# Patient Record
Sex: FEMALE | Race: WHITE | NOT HISPANIC OR LATINO
[De-identification: names, ages, dates, MRNs, and addresses within clinical notes are randomized per-mention and may not be internally consistent; named-entity substitution may affect disease eponyms.]

---

## 2019-10-17 ENCOUNTER — APPOINTMENT (OUTPATIENT)
Dept: CARDIOLOGY | Facility: CLINIC | Age: 84
End: 2019-10-17
Payer: MEDICARE

## 2019-10-17 PROCEDURE — 93000 ELECTROCARDIOGRAM COMPLETE: CPT

## 2019-10-17 PROCEDURE — 99213 OFFICE O/P EST LOW 20 MIN: CPT

## 2020-07-16 ENCOUNTER — APPOINTMENT (OUTPATIENT)
Dept: CARDIOLOGY | Facility: CLINIC | Age: 85
End: 2020-07-16

## 2020-11-14 ENCOUNTER — INPATIENT (INPATIENT)
Facility: HOSPITAL | Age: 85
LOS: 2 days | Discharge: HOME HEALTH | End: 2020-11-17
Attending: INTERNAL MEDICINE | Admitting: INTERNAL MEDICINE
Payer: MEDICARE

## 2020-11-14 VITALS
SYSTOLIC BLOOD PRESSURE: 189 MMHG | WEIGHT: 110.01 LBS | RESPIRATION RATE: 18 BRPM | HEIGHT: 62 IN | DIASTOLIC BLOOD PRESSURE: 98 MMHG | OXYGEN SATURATION: 95 % | HEART RATE: 116 BPM

## 2020-11-14 DIAGNOSIS — J47.0 BRONCHIECTASIS WITH ACUTE LOWER RESPIRATORY INFECTION: ICD-10-CM

## 2020-11-14 DIAGNOSIS — J18.9 PNEUMONIA, UNSPECIFIED ORGANISM: ICD-10-CM

## 2020-11-14 DIAGNOSIS — E04.1 NONTOXIC SINGLE THYROID NODULE: ICD-10-CM

## 2020-11-14 DIAGNOSIS — J47.9 BRONCHIECTASIS, UNCOMPLICATED: ICD-10-CM

## 2020-11-14 DIAGNOSIS — R04.2 HEMOPTYSIS: ICD-10-CM

## 2020-11-14 DIAGNOSIS — I71.2 THORACIC AORTIC ANEURYSM, WITHOUT RUPTURE: ICD-10-CM

## 2020-11-14 DIAGNOSIS — Z88.0 ALLERGY STATUS TO PENICILLIN: ICD-10-CM

## 2020-11-14 DIAGNOSIS — Z88.2 ALLERGY STATUS TO SULFONAMIDES: ICD-10-CM

## 2020-11-14 LAB
ALBUMIN SERPL ELPH-MCNC: 4.3 G/DL — SIGNIFICANT CHANGE UP (ref 3.5–5.2)
ALP SERPL-CCNC: 82 U/L — SIGNIFICANT CHANGE UP (ref 30–115)
ALT FLD-CCNC: 15 U/L — SIGNIFICANT CHANGE UP (ref 0–41)
ANION GAP SERPL CALC-SCNC: 10 MMOL/L — SIGNIFICANT CHANGE UP (ref 7–14)
APTT BLD: 34.6 SEC — SIGNIFICANT CHANGE UP (ref 27–39.2)
AST SERPL-CCNC: 16 U/L — SIGNIFICANT CHANGE UP (ref 0–41)
BASOPHILS # BLD AUTO: 0.02 K/UL — SIGNIFICANT CHANGE UP (ref 0–0.2)
BASOPHILS NFR BLD AUTO: 0.2 % — SIGNIFICANT CHANGE UP (ref 0–1)
BILIRUB SERPL-MCNC: 0.3 MG/DL — SIGNIFICANT CHANGE UP (ref 0.2–1.2)
BLD GP AB SCN SERPL QL: SIGNIFICANT CHANGE UP
BUN SERPL-MCNC: 29 MG/DL — HIGH (ref 10–20)
CALCIUM SERPL-MCNC: 9.5 MG/DL — SIGNIFICANT CHANGE UP (ref 8.5–10.1)
CHLORIDE SERPL-SCNC: 100 MMOL/L — SIGNIFICANT CHANGE UP (ref 98–110)
CO2 SERPL-SCNC: 28 MMOL/L — SIGNIFICANT CHANGE UP (ref 17–32)
CREAT SERPL-MCNC: 0.7 MG/DL — SIGNIFICANT CHANGE UP (ref 0.7–1.5)
EOSINOPHIL # BLD AUTO: 0.04 K/UL — SIGNIFICANT CHANGE UP (ref 0–0.7)
EOSINOPHIL NFR BLD AUTO: 0.4 % — SIGNIFICANT CHANGE UP (ref 0–8)
GLUCOSE SERPL-MCNC: 168 MG/DL — HIGH (ref 70–99)
HCT VFR BLD CALC: 43.2 % — SIGNIFICANT CHANGE UP (ref 37–47)
HGB BLD-MCNC: 13.7 G/DL — SIGNIFICANT CHANGE UP (ref 12–16)
IMM GRANULOCYTES NFR BLD AUTO: 0.2 % — SIGNIFICANT CHANGE UP (ref 0.1–0.3)
INR BLD: 1.06 RATIO — SIGNIFICANT CHANGE UP (ref 0.65–1.3)
LACTATE SERPL-SCNC: 1.5 MMOL/L — SIGNIFICANT CHANGE UP (ref 0.7–2)
LYMPHOCYTES # BLD AUTO: 1.87 K/UL — SIGNIFICANT CHANGE UP (ref 1.2–3.4)
LYMPHOCYTES # BLD AUTO: 19.8 % — LOW (ref 20.5–51.1)
MAGNESIUM SERPL-MCNC: 2.1 MG/DL — SIGNIFICANT CHANGE UP (ref 1.8–2.4)
MCHC RBC-ENTMCNC: 29.6 PG — SIGNIFICANT CHANGE UP (ref 27–31)
MCHC RBC-ENTMCNC: 31.7 G/DL — LOW (ref 32–37)
MCV RBC AUTO: 93.3 FL — SIGNIFICANT CHANGE UP (ref 81–99)
MONOCYTES # BLD AUTO: 1.08 K/UL — HIGH (ref 0.1–0.6)
MONOCYTES NFR BLD AUTO: 11.4 % — HIGH (ref 1.7–9.3)
NEUTROPHILS # BLD AUTO: 6.43 K/UL — SIGNIFICANT CHANGE UP (ref 1.4–6.5)
NEUTROPHILS NFR BLD AUTO: 68 % — SIGNIFICANT CHANGE UP (ref 42.2–75.2)
NRBC # BLD: 0 /100 WBCS — SIGNIFICANT CHANGE UP (ref 0–0)
NT-PROBNP SERPL-SCNC: 289 PG/ML — SIGNIFICANT CHANGE UP (ref 0–300)
PLATELET # BLD AUTO: 299 K/UL — SIGNIFICANT CHANGE UP (ref 130–400)
POTASSIUM SERPL-MCNC: 4 MMOL/L — SIGNIFICANT CHANGE UP (ref 3.5–5)
POTASSIUM SERPL-SCNC: 4 MMOL/L — SIGNIFICANT CHANGE UP (ref 3.5–5)
PROT SERPL-MCNC: 6.6 G/DL — SIGNIFICANT CHANGE UP (ref 6–8)
PROTHROM AB SERPL-ACNC: 12.2 SEC — SIGNIFICANT CHANGE UP (ref 9.95–12.87)
RAPID RVP RESULT: SIGNIFICANT CHANGE UP
RBC # BLD: 4.63 M/UL — SIGNIFICANT CHANGE UP (ref 4.2–5.4)
RBC # FLD: 13.2 % — SIGNIFICANT CHANGE UP (ref 11.5–14.5)
SARS-COV-2 RNA SPEC QL NAA+PROBE: SIGNIFICANT CHANGE UP
SODIUM SERPL-SCNC: 138 MMOL/L — SIGNIFICANT CHANGE UP (ref 135–146)
TROPONIN T SERPL-MCNC: <0.01 NG/ML — SIGNIFICANT CHANGE UP
WBC # BLD: 9.46 K/UL — SIGNIFICANT CHANGE UP (ref 4.8–10.8)
WBC # FLD AUTO: 9.46 K/UL — SIGNIFICANT CHANGE UP (ref 4.8–10.8)

## 2020-11-14 PROCEDURE — 71275 CT ANGIOGRAPHY CHEST: CPT | Mod: 26

## 2020-11-14 PROCEDURE — 99223 1ST HOSP IP/OBS HIGH 75: CPT

## 2020-11-14 PROCEDURE — 99285 EMERGENCY DEPT VISIT HI MDM: CPT

## 2020-11-14 PROCEDURE — 71045 X-RAY EXAM CHEST 1 VIEW: CPT | Mod: 26

## 2020-11-14 RX ORDER — SODIUM CHLORIDE 9 MG/ML
1000 INJECTION INTRAMUSCULAR; INTRAVENOUS; SUBCUTANEOUS ONCE
Refills: 0 | Status: COMPLETED | OUTPATIENT
Start: 2020-11-14 | End: 2020-11-14

## 2020-11-14 RX ADMIN — SODIUM CHLORIDE 1000 MILLILITER(S): 9 INJECTION INTRAMUSCULAR; INTRAVENOUS; SUBCUTANEOUS at 21:30

## 2020-11-14 NOTE — ED PROVIDER NOTE - OBJECTIVE STATEMENT
89 year old female past medical history of thoracic anyursem and bronchiectasis comes to emergency room for coughing up blood. patient explains that last night she had 2 epsiodes of coughing up a small amount of blood and that tonight about 1 hour ago coughed up a larger amount ~tablespoon. patient is not on a blood thinning medication. no chest pain, no shortness of breath, no leg pain or swelling. no back pain.

## 2020-11-14 NOTE — ED PROVIDER NOTE - ATTENDING CONTRIBUTION TO CARE
I personally evaluated the patient. I reviewed the Resident’s or Physician Assistant’s note (as assigned above), and agree with the findings and plan except as documented in my note.  Chart reviewed. H/O bronchiectasis, thoracic aneurysm, states she was coughing up blood since last night. No fever, chest pain or SOB. Exam shows alert patient in no distress, HEENT NCAT, lungs clear, no retractions, tachycardic, abdomen soft NT +BS, no CCE, no Vince's.

## 2020-11-14 NOTE — H&P ADULT - NSHPLABSRESULTS_GEN_ALL_CORE
CXR per ER - LLL infiltrate CXR per ER - LLL infiltrate                          13.7   9.46  )-----------( 299      ( 14 Nov 2020 19:20 )             43.2     11-14    138  |  100  |  29<H>  ----------------------------<  168<H>  4.0   |  28  |  0.7    Ca    9.5      14 Nov 2020 19:20  Mg     2.1     11-14    TPro  6.6  /  Alb  4.3  /  TBili  0.3  /  DBili  x   /  AST  16  /  ALT  15  /  AlkPhos  82  11-14            PT/INR - ( 14 Nov 2020 19:20 )   PT: 12.20 sec;   INR: 1.06 ratio         PTT - ( 14 Nov 2020 19:20 )  PTT:34.6 sec  Lactate Trend  11-14 @ 19:59 Lactate:1.5     CARDIAC MARKERS ( 14 Nov 2020 19:20 )  x     / <0.01 ng/mL / x     / x     / x          CAPILLARY BLOOD GLUCOSE    < from: CT Angio Chest PE Protocol w/ IV Cont (11.14.20 @ 20:46) >    EXAM:  CT ANGIO CHEST PE PROTOCOL IC          PROCEDURE DATE:  11/14/2020      < from: CT Angio Chest PE Protocol w/ IV Cont (11.14.20 @ 20:46) >    IMPRESSION:    1. Lingula and left lower lobe basilar medial segment extensive bronchiectasis with superimposed consolidative and nodular airspace opacities. Additional nodular airspace opacities throughout all lung lobes. Findings may be infectious in etiology, however follow-up CT chest in 2-3 months following treatment recommended to exclude underlying neoplasm.    2. No evidence of acute pulmonary embolism.    3. Aneurysmal dilation of ascending thoracic aorta up to 4.4 cm.    4. Partially calcified node 2.5 cm right thyroid lobe nodule.    AMBIKA MONROY M.D., ATTENDING RADIOLOGIST  This document has been electronically signed. Nov 14 2020  9:00PM    < end of copied text >

## 2020-11-14 NOTE — H&P ADULT - NSHPREVIEWOFSYSTEMS_GEN_ALL_CORE
Hearing loss Hearing loss (scheduled for ENT appointment in 2 days), avoids milk but eats yogurt daily Hearing loss (scheduled for ENT appointment in 2 days), wears glasses, avoids milk but eats yogurt daily

## 2020-11-14 NOTE — H&P ADULT - HISTORY OF PRESENT ILLNESS
[age]yo female whose PMH includes thoracic aneurysm and bronchiectasis (was supposed to follow up with pulmonary at [facility] but lives too far away), presents to the ER due to episodes of coughing up blood (small amount twice yesterday but a larger amount (about teaspoon) today 1 hour prior to ER visit. Denies chest pain or shortness of breath 89yo female whose PMH includes thoracic aneurysm and bronchiectasis (was supposed to follow up with pulmonary at [facility] but lives too far away), presents to the ER due to episodes of coughing up blood (small amount twice yesterday but a larger amount (about teaspoon) today 1 hour prior to ER visit. Denies chest pain, fevers or shortness of breath none

## 2020-11-14 NOTE — ED ADULT NURSE NOTE - NSIMPLEMENTINTERV_GEN_ALL_ED
Implemented All Fall with Harm Risk Interventions:  [place] to call system. Call bell, personal items and telephone within reach. Instruct patient to call for assistance. Room bathroom lighting operational. Non-slip footwear when patient is off stretcher. Physically safe environment: no spills, clutter or unnecessary equipment. Stretcher in lowest position, wheels locked, appropriate side rails in place. Provide visual cue, wrist band, yellow gown, etc. Monitor gait and stability. Monitor for mental status changes and reorient to person, place, and time. Review medications for side effects contributing to fall risk. Reinforce activity limits and safety measures with patient and family. Provide visual clues: red socks.

## 2020-11-14 NOTE — ED PROVIDER NOTE - PHYSICAL EXAMINATION
Physical Exam    Vital Signs: I have reviewed the initial vital signs.  Constitutional: elderly and frail, no acute distress  Eyes: Conjunctiva pink, Sclera clear, PERRLA, EOMI.  Cardiovascular: S1 and S2, regular rate, regular rhythm, well-perfused extremities, radial pulses equal and 2+  Respiratory: unlabored respiratory effort, clear to auscultation bilaterally no wheezing, rales and rhonchi  Gastrointestinal: soft, non-tender abdomen, no pulsatile mass, normal bowl sounds  Musculoskeletal: supple neck, no lower extremity edema, no midline tenderness  Integumentary: warm, dry, no rash  Neurologic: awake, alert, cranial nerves II-XII grossly intact, extremities’ motor and sensory functions grossly intact  Psychiatric: appropriate mood, appropriate affect

## 2020-11-14 NOTE — PATIENT PROFILE ADULT - FALLEN IN THE PAST
1. Have you been to the ER, urgent care clinic since your last visit? Hospitalized since your last visit? yes, [facility]    2. Have you seen or consulted any other health care providers outside of the [address] [place] [address] since your last visit? Include any pap smears or colon screening.  No no

## 2020-11-14 NOTE — ED PROVIDER NOTE - CLINICAL SUMMARY MEDICAL DECISION MAKING FREE TEXT BOX
Labs unremarkable. Covid negative. EKG sinus tach no acute changes. CXR LLL infiltrate. CTA chest no PE, +bronchiectasis with opacities. Given IVF and Levaquin. Will admit.

## 2020-11-15 DIAGNOSIS — I71.9 AORTIC ANEURYSM OF UNSPECIFIED SITE, WITHOUT RUPTURE: ICD-10-CM

## 2020-11-15 DIAGNOSIS — I10 ESSENTIAL (PRIMARY) HYPERTENSION: ICD-10-CM

## 2020-11-15 DIAGNOSIS — I26.99 OTHER PULMONARY EMBOLISM WITHOUT ACUTE COR PULMONALE: ICD-10-CM

## 2020-11-15 DIAGNOSIS — J18.9 PNEUMONIA, UNSPECIFIED ORGANISM: ICD-10-CM

## 2020-11-15 DIAGNOSIS — J47.9 BRONCHIECTASIS, UNCOMPLICATED: ICD-10-CM

## 2020-11-15 DIAGNOSIS — R04.2 HEMOPTYSIS: ICD-10-CM

## 2020-11-15 LAB
ALBUMIN SERPL ELPH-MCNC: 4.1 G/DL — SIGNIFICANT CHANGE UP (ref 3.5–5.2)
ALP SERPL-CCNC: 73 U/L — SIGNIFICANT CHANGE UP (ref 30–115)
ALT FLD-CCNC: 12 U/L — SIGNIFICANT CHANGE UP (ref 0–41)
ANION GAP SERPL CALC-SCNC: 8 MMOL/L — SIGNIFICANT CHANGE UP (ref 7–14)
AST SERPL-CCNC: 15 U/L — SIGNIFICANT CHANGE UP (ref 0–41)
BILIRUB SERPL-MCNC: 0.4 MG/DL — SIGNIFICANT CHANGE UP (ref 0.2–1.2)
BUN SERPL-MCNC: 19 MG/DL — SIGNIFICANT CHANGE UP (ref 10–20)
CALCIUM SERPL-MCNC: 9.3 MG/DL — SIGNIFICANT CHANGE UP (ref 8.5–10.1)
CHLORIDE SERPL-SCNC: 101 MMOL/L — SIGNIFICANT CHANGE UP (ref 98–110)
CO2 SERPL-SCNC: 30 MMOL/L — SIGNIFICANT CHANGE UP (ref 17–32)
CREAT SERPL-MCNC: 0.6 MG/DL — LOW (ref 0.7–1.5)
GLUCOSE SERPL-MCNC: 91 MG/DL — SIGNIFICANT CHANGE UP (ref 70–99)
HCT VFR BLD CALC: 41.4 % — SIGNIFICANT CHANGE UP (ref 37–47)
HGB BLD-MCNC: 13.2 G/DL — SIGNIFICANT CHANGE UP (ref 12–16)
MCHC RBC-ENTMCNC: 29.5 PG — SIGNIFICANT CHANGE UP (ref 27–31)
MCHC RBC-ENTMCNC: 31.9 G/DL — LOW (ref 32–37)
MCV RBC AUTO: 92.4 FL — SIGNIFICANT CHANGE UP (ref 81–99)
NRBC # BLD: 0 /100 WBCS — SIGNIFICANT CHANGE UP (ref 0–0)
PLATELET # BLD AUTO: 282 K/UL — SIGNIFICANT CHANGE UP (ref 130–400)
POTASSIUM SERPL-MCNC: 4.4 MMOL/L — SIGNIFICANT CHANGE UP (ref 3.5–5)
POTASSIUM SERPL-SCNC: 4.4 MMOL/L — SIGNIFICANT CHANGE UP (ref 3.5–5)
PROT SERPL-MCNC: 5.9 G/DL — LOW (ref 6–8)
RBC # BLD: 4.48 M/UL — SIGNIFICANT CHANGE UP (ref 4.2–5.4)
RBC # FLD: 13 % — SIGNIFICANT CHANGE UP (ref 11.5–14.5)
SODIUM SERPL-SCNC: 139 MMOL/L — SIGNIFICANT CHANGE UP (ref 135–146)
WBC # BLD: 9.67 K/UL — SIGNIFICANT CHANGE UP (ref 4.8–10.8)
WBC # FLD AUTO: 9.67 K/UL — SIGNIFICANT CHANGE UP (ref 4.8–10.8)

## 2020-11-15 PROCEDURE — 99233 SBSQ HOSP IP/OBS HIGH 50: CPT

## 2020-11-15 PROCEDURE — 99497 ADVNCD CARE PLAN 30 MIN: CPT | Mod: 25

## 2020-11-15 RX ORDER — LATANOPROST 0.05 MG/ML
1 SOLUTION/ DROPS OPHTHALMIC; TOPICAL AT BEDTIME
Refills: 0 | Status: DISCONTINUED | OUTPATIENT
Start: 2020-11-15 | End: 2020-11-17

## 2020-11-15 RX ORDER — LOSARTAN POTASSIUM 100 MG/1
50 TABLET, FILM COATED ORAL DAILY
Refills: 0 | Status: DISCONTINUED | OUTPATIENT
Start: 2020-11-15 | End: 2020-11-17

## 2020-11-15 RX ORDER — HEPARIN SODIUM 5000 [USP'U]/ML
5000 INJECTION INTRAVENOUS; SUBCUTANEOUS EVERY 12 HOURS
Refills: 0 | Status: DISCONTINUED | OUTPATIENT
Start: 2020-11-15 | End: 2020-11-17

## 2020-11-15 RX ORDER — METOPROLOL TARTRATE 50 MG
25 TABLET ORAL DAILY
Refills: 0 | Status: DISCONTINUED | OUTPATIENT
Start: 2020-11-15 | End: 2020-11-17

## 2020-11-15 RX ADMIN — LOSARTAN POTASSIUM 50 MILLIGRAM(S): 100 TABLET, FILM COATED ORAL at 05:16

## 2020-11-15 RX ADMIN — HEPARIN SODIUM 5000 UNIT(S): 5000 INJECTION INTRAVENOUS; SUBCUTANEOUS at 17:06

## 2020-11-15 RX ADMIN — LATANOPROST 1 DROP(S): 0.05 SOLUTION/ DROPS OPHTHALMIC; TOPICAL at 21:58

## 2020-11-15 NOTE — CONSULT NOTE ADULT - ASSESSMENT
IMPRESSION:        PLAN:     IMPRESSIOn   Pneumonia   acute on chronic bronchiectasis       PLAN:  Iv levaquine for now   follow bld cx   sputum cx   start prednisone 20 mg for 4 days   nebulizer Q 4 hrs prn   need follow up in 6 weeks to repeat ct scan

## 2020-11-15 NOTE — PROGRESS NOTE ADULT - SUBJECTIVE AND OBJECTIVE BOX
INTERVAL HPI/OVERNIGHT EVENTS:    SUBJECTIVE: Patient seen and examined at bedside.     no cp, sob, abd pain, fever  no further episode hemotypsis, no sob, cough, orthopnea    OBJECTIVE:    VITAL SIGNS:  Vital Signs Last 24 Hrs  T(C): 36.3 (15 Nov 2020 05:35), Max: 37.2 (14 Nov 2020 21:30)  T(F): 97.4 (15 Nov 2020 05:35), Max: 98.9 (14 Nov 2020 21:30)  HR: 83 (15 Nov 2020 05:35) (83 - 116)  BP: 146/72 (15 Nov 2020 05:35) (145/92 - 189/98)  BP(mean): --  RR: 16 (15 Nov 2020 05:35) (16 - 18)  SpO2: 96% (15 Nov 2020 00:18) (95% - 96%)      PHYSICAL EXAM:    General: NAD  HEENT: NC/AT; PERRL, clear conjunctiva  Neck: supple  Respiratory: diffuse crackles  Cardiovascular: +S1/S2; RRR  Abdomen: soft, NT/ND; +BS x4  Extremities: WWP, 2+ peripheral pulses b/l; no LE edema  Skin: normal color and turgor; no rash  Neurological:    MEDICATIONS:  MEDICATIONS  (STANDING):  heparin   Injectable 5000 Unit(s) SubCutaneous every 12 hours  latanoprost 0.005% Ophthalmic Solution 1 Drop(s) Both EYES at bedtime  levoFLOXacin IVPB 750 milliGRAM(s) IV Intermittent every 24 hours  losartan 50 milliGRAM(s) Oral daily  metoprolol succinate ER 25 milliGRAM(s) Oral daily    MEDICATIONS  (PRN):      ALLERGIES:  Allergies    penicillin (Eye Irritation)  sulfa drugs (Eye Irritation)    Intolerances        LABS:                        13.2   9.67  )-----------( 282      ( 15 Nov 2020 06:20 )             41.4     Hemoglobin: 13.2 g/dL (11-15 @ 06:20)  Hemoglobin: 13.7 g/dL (11-14 @ 19:20)    CBC Full  -  ( 15 Nov 2020 06:20 )  WBC Count : 9.67 K/uL  RBC Count : 4.48 M/uL  Hemoglobin : 13.2 g/dL  Hematocrit : 41.4 %  Platelet Count - Automated : 282 K/uL  Mean Cell Volume : 92.4 fL  Mean Cell Hemoglobin : 29.5 pg  Mean Cell Hemoglobin Concentration : 31.9 g/dL  Auto Neutrophil # : x  Auto Lymphocyte # : x  Auto Monocyte # : x  Auto Eosinophil # : x  Auto Basophil # : x  Auto Neutrophil % : x  Auto Lymphocyte % : x  Auto Monocyte % : x  Auto Eosinophil % : x  Auto Basophil % : x    11-15    139  |  101  |  19  ----------------------------<  91  4.4   |  30  |  0.6<L>    Ca    9.3      15 Nov 2020 06:20  Mg     2.1     11-14    TPro  5.9<L>  /  Alb  4.1  /  TBili  0.4  /  DBili  x   /  AST  15  /  ALT  12  /  AlkPhos  73  11-15    Creatinine Trend: 0.6<--, 0.7<--  LIVER FUNCTIONS - ( 15 Nov 2020 06:20 )  Alb: 4.1 g/dL / Pro: 5.9 g/dL / ALK PHOS: 73 U/L / ALT: 12 U/L / AST: 15 U/L / GGT: x           PT/INR - ( 14 Nov 2020 19:20 )   PT: 12.20 sec;   INR: 1.06 ratio         PTT - ( 14 Nov 2020 19:20 )  PTT:34.6 sec    hs Troponin:              CSF:                      EKG:   MICROBIOLOGY:    IMAGING:      Labs, imaging, EKG personally reviewed    RADIOLOGY & ADDITIONAL TESTS: Reviewed.

## 2020-11-15 NOTE — GOALS OF CARE CONVERSATION - ADVANCED CARE PLANNING - CONVERSATION DETAILS
D/w pt at bedside, she has bronchiectasis, admitted with hemotypsis. She has no living will, she is dnr/ dni. Molst filled, placed in chart.

## 2020-11-15 NOTE — CONSULT NOTE ADULT - SUBJECTIVE AND OBJECTIVE BOX
Patient is a 89y old  Female who presents with a chief complaint of pneumonia (14 Nov 2020 21:23)      HPI:  89yo female whose PMH includes thoracic aneurysm and bronchiectasis (was supposed to follow up with pulmonary at [facility] but lives too far away), presents to the ER due to episodes of coughing up blood (small amount twice yesterday but a larger amount (about teaspoon) today 1 hour prior to ER visit. Denies chest pain, fevers or shortness of breath (14 Nov 2020 21:23)      PAST MEDICAL & SURGICAL HISTORY:  HTN (hypertension)    Aneurysm of aorta  Throacic    Bronchiectasis    No significant past surgical history      Allergies    penicillin (Eye Irritation)  sulfa drugs (Eye Irritation)    Intolerances      Family history : no cardiovscular family history   Home Medications:  latanoprost 0.005% ophthalmic solution: 1 drop(s) to each affected eye once a day (in the evening) (14 Nov 2020 21:54)  losartan 50 mg oral tablet: 1 tab(s) orally once a day (14 Nov 2020 21:54)  metoprolol succinate 25 mg oral tablet, extended release: 1 tab(s) orally once a day (14 Nov 2020 21:54)    Occupation:  Alochol: Denied  Smoking: Denied  Drug Use: Denied  Marital Status:         ROS: as in HPI; All other systems reviewed are negative    ICU Vital Signs Last 24 Hrs  T(C): 36.3 (15 Nov 2020 05:35), Max: 37.2 (14 Nov 2020 21:30)  T(F): 97.4 (15 Nov 2020 05:35), Max: 98.9 (14 Nov 2020 21:30)  HR: 83 (15 Nov 2020 05:35) (83 - 116)  BP: 146/72 (15 Nov 2020 05:35) (145/92 - 189/98)  BP(mean): --  ABP: --  ABP(mean): --  RR: 16 (15 Nov 2020 05:35) (16 - 18)  SpO2: 96% (15 Nov 2020 00:18) (95% - 96%)        Physical Examination:    General: No acute distress.  Alert, oriented, interactive, nonfocal    HEENT: Pupils equal, reactive to light.  Symmetric.    PULM: b/l rhonchi     CVS: Regular rate and rhythm, no murmurs, rubs, or gallops    ABD: Soft, nondistended, nontender, normoactive bowel sounds, no masses    EXT: No edema, nontender, no clubbing     SKIN: Warm and well perfused, no rashes noted.    Neurology : no motor or sensory deficit     Musculoskeletal : move all extremity     Lymphatic system: no Palpable node               I&O's Detail        LABS:                        13.2   9.67  )-----------( 282      ( 15 Nov 2020 06:20 )             41.4     14 Nov 2020 19:20    138    |  100    |  29     ----------------------------<  168    4.0     |  28     |  0.7      Ca    9.5        14 Nov 2020 19:20  Mg     2.1       14 Nov 2020 19:20    TPro  6.6    /  Alb  4.3    /  TBili  0.3    /  DBili  x      /  AST  16     /  ALT  15     /  AlkPhos  82     14 Nov 2020 19:20  Amylase x     lipase x          CARDIAC MARKERS ( 14 Nov 2020 19:20 )  x     / <0.01 ng/mL / x     / x     / x          CAPILLARY BLOOD GLUCOSE        PT/INR - ( 14 Nov 2020 19:20 )   PT: 12.20 sec;   INR: 1.06 ratio         PTT - ( 14 Nov 2020 19:20 )  PTT:34.6 sec    Culture    Lactate, Blood: 1.5 mmol/L (11-14-20 @ 19:59)      MEDICATIONS  (STANDING):  latanoprost 0.005% Ophthalmic Solution 1 Drop(s) Both EYES at bedtime  levoFLOXacin IVPB 750 milliGRAM(s) IV Intermittent every 24 hours  losartan 50 milliGRAM(s) Oral daily  metoprolol succinate ER 25 milliGRAM(s) Oral daily    MEDICATIONS  (PRN):        RADIOLOGY: ***   c< from: CT Angio Chest PE Protocol w/ IV Cont (11.14.20 @ 20:46) >  . Lingula and left lower lobe basilar medial segment extensive bronchiectasis with superimposed consolidative and nodular airspace opacities. Additional nodular airspace opacities throughout all lung lobes. Findings may be infectious in etiology, however follow-up CT chest in 2-3 months following treatment recommended to exclude underlying neoplasm.    2. No evidence of acute pulmonary embolism.    3. Aneurysmal dilation of ascending thoracic aorta up to 4.4 cm.    4. Partially calcified node 2.5 cm right thyroid lobe nodule.    < end of copied text >    CXR:  TLC:  OG:  ET tube:        CAM ICU:  ECHO:

## 2020-11-15 NOTE — PROGRESS NOTE ADULT - ASSESSMENT
89F PMHx thoracic aortic anuerysm, HTN, bronchiectasis here with hemotpysis, found to have multifocal pna.    #Hemoptysis, in setting of multifocal pna, unknown if gram neg  in setting of bronchiectasis  one episode, cough with blood splatter, unable to quantify  no risk factors for TB  cont levaquin  prednisone 20  check sputum cx, legionealla, strep  h/h stable, trend daily  no hyopxia, satting well on ra  appreciate pulm  #Thyroid nodule  outpt f/u  #HTN  losartan 50  toprol 25  #Thoracic aortic anuerysm  stable, outpt f/u  #DVT ppx  subq hep    #Progress Note Handoff:  Pending (specify):  Consults_________, Tests________, Test Results_______, Other____hempotysis_____  Family discussion:d/w pt at bedside re: treatment plan, primary dx  Disposition: Home__x_/SNF___/Other________/Unknown at this time________

## 2020-11-16 LAB
HCT VFR BLD CALC: 42.8 % — SIGNIFICANT CHANGE UP (ref 37–47)
HGB BLD-MCNC: 13.5 G/DL — SIGNIFICANT CHANGE UP (ref 12–16)
MCHC RBC-ENTMCNC: 29.8 PG — SIGNIFICANT CHANGE UP (ref 27–31)
MCHC RBC-ENTMCNC: 31.5 G/DL — LOW (ref 32–37)
MCV RBC AUTO: 94.5 FL — SIGNIFICANT CHANGE UP (ref 81–99)
NRBC # BLD: 0 /100 WBCS — SIGNIFICANT CHANGE UP (ref 0–0)
PLATELET # BLD AUTO: 290 K/UL — SIGNIFICANT CHANGE UP (ref 130–400)
RBC # BLD: 4.53 M/UL — SIGNIFICANT CHANGE UP (ref 4.2–5.4)
RBC # FLD: 13.1 % — SIGNIFICANT CHANGE UP (ref 11.5–14.5)
SARS-COV-2 IGG SERPL QL IA: NEGATIVE — SIGNIFICANT CHANGE UP
SARS-COV-2 IGM SERPL IA-ACNC: <0.1 INDEX — SIGNIFICANT CHANGE UP
WBC # BLD: 9.43 K/UL — SIGNIFICANT CHANGE UP (ref 4.8–10.8)
WBC # FLD AUTO: 9.43 K/UL — SIGNIFICANT CHANGE UP (ref 4.8–10.8)

## 2020-11-16 PROCEDURE — 99497 ADVNCD CARE PLAN 30 MIN: CPT | Mod: 25

## 2020-11-16 PROCEDURE — 99233 SBSQ HOSP IP/OBS HIGH 50: CPT

## 2020-11-16 RX ADMIN — HEPARIN SODIUM 5000 UNIT(S): 5000 INJECTION INTRAVENOUS; SUBCUTANEOUS at 06:23

## 2020-11-16 RX ADMIN — LATANOPROST 1 DROP(S): 0.05 SOLUTION/ DROPS OPHTHALMIC; TOPICAL at 21:16

## 2020-11-16 RX ADMIN — Medication 20 MILLIGRAM(S): at 15:04

## 2020-11-16 RX ADMIN — LOSARTAN POTASSIUM 50 MILLIGRAM(S): 100 TABLET, FILM COATED ORAL at 06:23

## 2020-11-16 RX ADMIN — HEPARIN SODIUM 5000 UNIT(S): 5000 INJECTION INTRAVENOUS; SUBCUTANEOUS at 17:27

## 2020-11-16 NOTE — GOALS OF CARE CONVERSATION - ADVANCED CARE PLANNING - CONVERSATION DETAILS
D/w pt at bedside re: resusictation. She has changed her mind regarding DNR. She would like CPR if her heart were to stop, but she does not want to be kept alive on "feeding tubes" and artifical means. MOLST destroyed, DNR rescinded.

## 2020-11-16 NOTE — PROGRESS NOTE ADULT - SUBJECTIVE AND OBJECTIVE BOX
INTERVAL HPI/OVERNIGHT EVENTS:    SUBJECTIVE: Patient seen and examined at bedside.     no cp, sob, abd pain, fever  no sob, orthopnea, pnd, cough    OBJECTIVE:    VITAL SIGNS:  Vital Signs Last 24 Hrs  T(C): 36.3 (16 Nov 2020 05:08), Max: 37.1 (15 Nov 2020 19:43)  T(F): 97.4 (16 Nov 2020 05:08), Max: 98.8 (15 Nov 2020 19:43)  HR: 75 (16 Nov 2020 05:08) (75 - 90)  BP: 137/61 (16 Nov 2020 05:08) (128/66 - 150/75)  BP(mean): --  RR: 16 (16 Nov 2020 05:08) (16 - 16)  SpO2: 94% (16 Nov 2020 08:14) (94% - 94%)      PHYSICAL EXAM:    General: NAD  HEENT: NC/AT; PERRL, clear conjunctiva  Neck: supple  Respiratory: base crackles  Cardiovascular: +S1/S2; RRR  Abdomen: soft, NT/ND; +BS x4  Extremities: WWP, 2+ peripheral pulses b/l; no LE edema  Skin: normal color and turgor; no rash  Neurological:    MEDICATIONS:  MEDICATIONS  (STANDING):  heparin   Injectable 5000 Unit(s) SubCutaneous every 12 hours  latanoprost 0.005% Ophthalmic Solution 1 Drop(s) Both EYES at bedtime  levoFLOXacin IVPB 750 milliGRAM(s) IV Intermittent every 24 hours  losartan 50 milliGRAM(s) Oral daily  metoprolol succinate ER 25 milliGRAM(s) Oral daily  predniSONE   Tablet 20 milliGRAM(s) Oral daily    MEDICATIONS  (PRN):      ALLERGIES:  Allergies    penicillin (Eye Irritation)  sulfa drugs (Eye Irritation)    Intolerances        LABS:                        13.5   9.43  )-----------( 290      ( 16 Nov 2020 07:43 )             42.8     Hemoglobin: 13.5 g/dL (11-16 @ 07:43)  Hemoglobin: 13.2 g/dL (11-15 @ 06:20)  Hemoglobin: 13.7 g/dL (11-14 @ 19:20)    CBC Full  -  ( 16 Nov 2020 07:43 )  WBC Count : 9.43 K/uL  RBC Count : 4.53 M/uL  Hemoglobin : 13.5 g/dL  Hematocrit : 42.8 %  Platelet Count - Automated : 290 K/uL  Mean Cell Volume : 94.5 fL  Mean Cell Hemoglobin : 29.8 pg  Mean Cell Hemoglobin Concentration : 31.5 g/dL  Auto Neutrophil # : x  Auto Lymphocyte # : x  Auto Monocyte # : x  Auto Eosinophil # : x  Auto Basophil # : x  Auto Neutrophil % : x  Auto Lymphocyte % : x  Auto Monocyte % : x  Auto Eosinophil % : x  Auto Basophil % : x    11-15    139  |  101  |  19  ----------------------------<  91  4.4   |  30  |  0.6<L>    Ca    9.3      15 Nov 2020 06:20  Mg     2.1     11-14    TPro  5.9<L>  /  Alb  4.1  /  TBili  0.4  /  DBili  x   /  AST  15  /  ALT  12  /  AlkPhos  73  11-15    Creatinine Trend: 0.6<--, 0.7<--  LIVER FUNCTIONS - ( 15 Nov 2020 06:20 )  Alb: 4.1 g/dL / Pro: 5.9 g/dL / ALK PHOS: 73 U/L / ALT: 12 U/L / AST: 15 U/L / GGT: x           PT/INR - ( 14 Nov 2020 19:20 )   PT: 12.20 sec;   INR: 1.06 ratio         PTT - ( 14 Nov 2020 19:20 )  PTT:34.6 sec    hs Troponin:              CSF:                      EKG:   MICROBIOLOGY:    Culture - Blood (collected 14 Nov 2020 19:59)  Source: .Blood Blood-Peripheral  Preliminary Report (16 Nov 2020 05:01):    No growth to date.    Culture - Blood (collected 14 Nov 2020 19:59)  Source: .Blood Blood-Peripheral  Preliminary Report (16 Nov 2020 05:01):    No growth to date.      IMAGING:      Labs, imaging, EKG personally reviewed    RADIOLOGY & ADDITIONAL TESTS: Reviewed.

## 2020-11-17 ENCOUNTER — TRANSCRIPTION ENCOUNTER (OUTPATIENT)
Age: 85
End: 2020-11-17

## 2020-11-17 VITALS — HEART RATE: 86 BPM | OXYGEN SATURATION: 94 %

## 2020-11-17 LAB
GRAM STN FLD: SIGNIFICANT CHANGE UP
LEGIONELLA AG UR QL: NEGATIVE — SIGNIFICANT CHANGE UP
SPECIMEN SOURCE: SIGNIFICANT CHANGE UP

## 2020-11-17 PROCEDURE — 99239 HOSP IP/OBS DSCHRG MGMT >30: CPT

## 2020-11-17 RX ORDER — CIPROFLOXACIN LACTATE 400MG/40ML
1 VIAL (ML) INTRAVENOUS
Qty: 1 | Refills: 0
Start: 2020-11-17 | End: 2020-11-17

## 2020-11-17 RX ORDER — FLUTICASONE PROPIONATE 50 MCG
50 SPRAY, SUSPENSION NASAL
Qty: 15.8 | Refills: 0
Start: 2020-11-17

## 2020-11-17 RX ADMIN — Medication 25 MILLIGRAM(S): at 05:33

## 2020-11-17 RX ADMIN — HEPARIN SODIUM 5000 UNIT(S): 5000 INJECTION INTRAVENOUS; SUBCUTANEOUS at 05:33

## 2020-11-17 RX ADMIN — LOSARTAN POTASSIUM 50 MILLIGRAM(S): 100 TABLET, FILM COATED ORAL at 05:33

## 2020-11-17 RX ADMIN — Medication 20 MILLIGRAM(S): at 05:33

## 2020-11-17 NOTE — DISCHARGE NOTE PROVIDER - CARE PROVIDER_API CALL
Dax Frey  INTERNAL MEDICINE  [address] [place] [address]  Phone: [phone]  Fax: [phone]  Follow Up Time:     Matt Cuellar)  Critical Care Medicine; Internal Medicine; Pulmonary Disease  [address] [place] [address], [facility] [address]  [place] [address]  Phone: [phone]  Fax: [phone]  Follow Up Time:

## 2020-11-17 NOTE — DISCHARGE NOTE NURSING/CASE MANAGEMENT/SOCIAL WORK - PATIENT PORTAL LINK FT
You can access the FollowMyHealth Patient Portal offered by [facility] by registering at the following website: [email or web address]. By joining [facility]’s FollowMyHealth portal, you will also be able to view your health information using other applications (apps) compatible with our system.

## 2020-11-17 NOTE — PROGRESS NOTE ADULT - SUBJECTIVE AND OBJECTIVE BOX
INTERVAL HPI/OVERNIGHT EVENTS:    SUBJECTIVE: Patient seen and examined at bedside.     no cp, sob, abd pain, fever  no sob, orthopnea, pnd, hempotysis    OBJECTIVE:    VITAL SIGNS:  Vital Signs Last 24 Hrs  T(C): 36.3 (17 Nov 2020 05:12), Max: 36.7 (16 Nov 2020 22:09)  T(F): 97.3 (17 Nov 2020 05:12), Max: 98 (16 Nov 2020 22:09)  HR: 86 (17 Nov 2020 07:50) (79 - 104)  BP: 125/64 (17 Nov 2020 05:12) (108/55 - 138/75)  BP(mean): --  RR: 16 (17 Nov 2020 05:12) (16 - 16)  SpO2: 94% (17 Nov 2020 07:50) (94% - 94%)      PHYSICAL EXAM:    General: NAD  HEENT: NC/AT; PERRL, clear conjunctiva  Neck: supple  Respiratory: CTA b/l  Cardiovascular: +S1/S2; RRR  Abdomen: soft, NT/ND; +BS x4  Extremities: WWP, 2+ peripheral pulses b/l; no LE edema  Skin: normal color and turgor; no rash  Neurological:    MEDICATIONS:  MEDICATIONS  (STANDING):  heparin   Injectable 5000 Unit(s) SubCutaneous every 12 hours  latanoprost 0.005% Ophthalmic Solution 1 Drop(s) Both EYES at bedtime  levoFLOXacin IVPB 750 milliGRAM(s) IV Intermittent every 24 hours  losartan 50 milliGRAM(s) Oral daily  metoprolol succinate ER 25 milliGRAM(s) Oral daily  predniSONE   Tablet 20 milliGRAM(s) Oral daily    MEDICATIONS  (PRN):      ALLERGIES:  Allergies    penicillin (Eye Irritation)  sulfa drugs (Eye Irritation)    Intolerances        LABS:                        13.5   9.43  )-----------( 290      ( 16 Nov 2020 07:43 )             42.8     Hemoglobin: 13.5 g/dL (11-16 @ 07:43)  Hemoglobin: 13.2 g/dL (11-15 @ 06:20)  Hemoglobin: 13.7 g/dL (11-14 @ 19:20)    CBC Full  -  ( 16 Nov 2020 07:43 )  WBC Count : 9.43 K/uL  RBC Count : 4.53 M/uL  Hemoglobin : 13.5 g/dL  Hematocrit : 42.8 %  Platelet Count - Automated : 290 K/uL  Mean Cell Volume : 94.5 fL  Mean Cell Hemoglobin : 29.8 pg  Mean Cell Hemoglobin Concentration : 31.5 g/dL  Auto Neutrophil # : x  Auto Lymphocyte # : x  Auto Monocyte # : x  Auto Eosinophil # : x  Auto Basophil # : x  Auto Neutrophil % : x  Auto Lymphocyte % : x  Auto Monocyte % : x  Auto Eosinophil % : x  Auto Basophil % : x          Creatinine Trend: 0.6<--, 0.7<--        hs Troponin:              CSF:                      EKG:   MICROBIOLOGY:    Culture - Blood (collected 14 Nov 2020 19:59)  Source: .Blood Blood-Peripheral  Preliminary Report (16 Nov 2020 05:01):    No growth to date.    Culture - Blood (collected 14 Nov 2020 19:59)  Source: .Blood Blood-Peripheral  Preliminary Report (16 Nov 2020 05:01):    No growth to date.      IMAGING:      Labs, imaging, EKG personally reviewed    RADIOLOGY & ADDITIONAL TESTS: Reviewed.

## 2020-11-17 NOTE — DISCHARGE NOTE PROVIDER - NSDCMRMEDTOKEN_GEN_ALL_CORE_FT
latanoprost 0.005% ophthalmic solution: 1 drop(s) to each affected eye once a day (in the evening)  levoFLOXacin 750 mg oral tablet: 1 tab(s) orally once a day   losartan 50 mg oral tablet: 1 tab(s) orally once a day  metoprolol succinate 25 mg oral tablet, extended release: 1 tab(s) orally once a day  predniSONE 20 mg oral tablet: 1 tab(s) orally once a day   Flonase 50 mcg/inh nasal spray: 50 microgram(s) intranasally once a day   latanoprost 0.005% ophthalmic solution: 1 drop(s) to each affected eye once a day (in the evening)  levoFLOXacin 750 mg oral tablet: 1 tab(s) orally once a day   losartan 50 mg oral tablet: 1 tab(s) orally once a day  metoprolol succinate 25 mg oral tablet, extended release: 1 tab(s) orally once a day  predniSONE 20 mg oral tablet: 1 tab(s) orally once a day

## 2020-11-17 NOTE — DISCHARGE NOTE PROVIDER - HOSPITAL COURSE
89F PMHx thoracic aortic anuerysm, HTN, bronchiectasis here with hemotpysis. Had one episode hemoptysis at home, seen by pulm, no risk factors for TB. Underwent ct chest, found to have multifocal pna. Started on iv abx, started on prednisone. She had no hypoxia, saturation well on RA. She had no further episodes of hemoptysis, no sob. She was stable for d/c on 11/17, needs outpt pmd, pulm f/u one week, will need repeat ct chest to demonstrate resolution.    41 minutes spent on discharge planning.

## 2020-11-17 NOTE — PROGRESS NOTE ADULT - ASSESSMENT
IMPRESSIOn   Pneumonia   acute on chronic bronchiectasis       PLAN:  levaquine Po  for  5 more days   follow bld cx   sputum cx    prednisone 20 mg for 4 days   nebulizer Q 4 hrs prn   need follow up in 6 weeks to repeat ct scan

## 2020-11-17 NOTE — PROGRESS NOTE ADULT - ASSESSMENT
89F PMHx thoracic aortic anuerysm, HTN, bronchiectasis here with hemotpysis, found to have multifocal pna.    #Hemoptysis, in setting of multifocal pna, unknown if gram neg  in setting of bronchiectasis  one episode, cough with blood splatter, unable to quantify  no risk factors for TB  cont levaquin  prednisone 20  no hyopxia, satting well on ra  stable for d/c, needs outpt pmd, pulm f/u one week  #Thyroid nodule  outpt f/u  #HTN  losartan 50  toprol 25  #Thoracic aortic anuerysm  stable, outpt f/u  #DVT ppx  subq hep

## 2020-11-17 NOTE — PROGRESS NOTE ADULT - SUBJECTIVE AND OBJECTIVE BOX
Patient is a 89y old  Female who presents with a chief complaint of pneumonia (16 Nov 2020 11:16)      INTERVAL HISTORY/overnight events  patient seen and examined  feel good no cough or hemoptysis   case discussed with son yesterday over the phone told him risk of malignancy and need to follow as outpatient   to repeat ct scan in 4-6 weeks       Vital Signs Last 24 Hrs  T(C): 36.3 (17 Nov 2020 05:12), Max: 36.7 (16 Nov 2020 22:09)  T(F): 97.3 (17 Nov 2020 05:12), Max: 98 (16 Nov 2020 22:09)  HR: 86 (17 Nov 2020 07:50) (79 - 104)  BP: 125/64 (17 Nov 2020 05:12) (108/55 - 138/75)  BP(mean): --  RR: 16 (17 Nov 2020 05:12) (16 - 16)  SpO2: 94% (17 Nov 2020 07:50) (94% - 94%)  Daily     Daily   I&O's Summary    16 Nov 2020 07:01  -  17 Nov 2020 07:00  --------------------------------------------------------  IN: 0 mL / OUT: 350 mL / NET: -350 mL        Physical Examination:    General: No acute distress.  Alert, oriented, interactive, nonfocal    HEENT: Pupils equal, reactive to light.  Symmetric.    PULM: Clear to auscultation bilaterally, no significant sputum production    CVS: Regular rate and rhythm, no murmurs, rubs, or gallops    ABD: Soft, nondistended, nontender, normoactive bowel sounds, no masses    EXT: No edema, nontender    SKIN: Warm and well perfused, no rashes noted.    Neurology:    Musculoskeletal : Move all extremety         Lab Results:                        13.5   9.43  )-----------( 290      ( 16 Nov 2020 07:43 )             42.8                     Microbiology    Culture - Blood (collected 14 Nov 2020 19:59)  Source: .Blood Blood-Peripheral  Preliminary Report (16 Nov 2020 05:01):    No growth to date.    Culture - Blood (collected 14 Nov 2020 19:59)  Source: .Blood Blood-Peripheral  Preliminary Report (16 Nov 2020 05:01):    No growth to date.        Medication:  MEDICATIONS  (STANDING):  heparin   Injectable 5000 Unit(s) SubCutaneous every 12 hours  latanoprost 0.005% Ophthalmic Solution 1 Drop(s) Both EYES at bedtime  levoFLOXacin IVPB 750 milliGRAM(s) IV Intermittent every 24 hours  losartan 50 milliGRAM(s) Oral daily  metoprolol succinate ER 25 milliGRAM(s) Oral daily  predniSONE   Tablet 20 milliGRAM(s) Oral daily    MEDICATIONS  (PRN):        IMAGING STUDIES:

## 2020-11-17 NOTE — DISCHARGE NOTE PROVIDER - NSDCCPCAREPLAN_GEN_ALL_CORE_FT
PRINCIPAL DISCHARGE DIAGNOSIS  Diagnosis: Pneumonia  Assessment and Plan of Treatment: PLEASE FOLLOW UP WITH YOUR PRIMARY CARE DOCTOR, PULMONOLOGIST IN ONE WEEK. YOU WILL NEED A REPEAT CT SCAN OF THE CHEST.      SECONDARY DISCHARGE DIAGNOSES  Diagnosis: Bronchiectasis  Assessment and Plan of Treatment:     Diagnosis: Hemoptysis  Assessment and Plan of Treatment:

## 2020-11-18 LAB — S PNEUM AG UR QL: NEGATIVE — SIGNIFICANT CHANGE UP

## 2020-11-19 LAB
CULTURE RESULTS: SIGNIFICANT CHANGE UP
SPECIMEN SOURCE: SIGNIFICANT CHANGE UP

## 2020-11-20 LAB
CULTURE RESULTS: SIGNIFICANT CHANGE UP
CULTURE RESULTS: SIGNIFICANT CHANGE UP
SPECIMEN SOURCE: SIGNIFICANT CHANGE UP
SPECIMEN SOURCE: SIGNIFICANT CHANGE UP

## 2021-02-19 PROBLEM — J47.9 BRONCHIECTASIS, UNCOMPLICATED: Chronic | Status: ACTIVE | Noted: 2020-11-14

## 2021-02-19 PROBLEM — I10 ESSENTIAL (PRIMARY) HYPERTENSION: Chronic | Status: ACTIVE | Noted: 2020-11-14

## 2021-06-21 PROBLEM — Z00.00 ENCOUNTER FOR PREVENTIVE HEALTH EXAMINATION: Status: ACTIVE | Noted: 2021-06-21

## 2021-06-22 ENCOUNTER — APPOINTMENT (OUTPATIENT)
Dept: PULMONOLOGY | Facility: CLINIC | Age: 86
End: 2021-06-22
Payer: MEDICARE

## 2021-06-22 VITALS
RESPIRATION RATE: 14 BRPM | WEIGHT: 110 LBS | BODY MASS INDEX: 20.24 KG/M2 | OXYGEN SATURATION: 94 % | HEIGHT: 62 IN | DIASTOLIC BLOOD PRESSURE: 72 MMHG | SYSTOLIC BLOOD PRESSURE: 120 MMHG | HEART RATE: 111 BPM

## 2021-06-22 PROCEDURE — 99072 ADDL SUPL MATRL&STAF TM PHE: CPT

## 2021-06-22 PROCEDURE — 99213 OFFICE O/P EST LOW 20 MIN: CPT

## 2021-06-22 NOTE — PHYSICAL EXAM
[No Acute Distress] : no acute distress [Normal Oropharynx] : normal oropharynx [Normal Appearance] : normal appearance [No Neck Mass] : no neck mass [Normal Rate/Rhythm] : normal rate/rhythm [Normal S1, S2] : normal s1, s2 [No Murmurs] : no murmurs [No Resp Distress] : no resp distress [Clear to Auscultation Bilaterally] : clear to auscultation bilaterally [Normal Gait] : normal gait [No Clubbing] : no clubbing [No Cyanosis] : no cyanosis [No Edema] : no edema [Normal Color/ Pigmentation] : normal color/ pigmentation [FROM] : FROM [Oriented x3] : oriented x3 [Normal Affect] : normal affect

## 2021-12-14 ENCOUNTER — APPOINTMENT (OUTPATIENT)
Age: 86
End: 2021-12-14

## 2021-12-15 ENCOUNTER — APPOINTMENT (OUTPATIENT)
Age: 86
End: 2021-12-15
Payer: MEDICARE

## 2021-12-15 DIAGNOSIS — J47.9 BRONCHIECTASIS, UNCOMPLICATED: ICD-10-CM

## 2021-12-15 PROCEDURE — 99441: CPT | Mod: 95

## 2021-12-15 NOTE — HISTORY OF PRESENT ILLNESS
[Home] : at home, [unfilled] , at the time of the visit. [Medical Office: ([facility])___] : at the medical office located in

## 2022-02-15 ENCOUNTER — INPATIENT (INPATIENT)
Facility: HOSPITAL | Age: 87
LOS: 4 days | Discharge: NURSING, REHAB OR LONG-TERM CARE | End: 2022-02-20
Attending: INTERNAL MEDICINE | Admitting: INTERNAL MEDICINE
Payer: MEDICARE

## 2022-02-15 VITALS
DIASTOLIC BLOOD PRESSURE: 87 MMHG | OXYGEN SATURATION: 95 % | HEIGHT: 64 IN | TEMPERATURE: 97 F | HEART RATE: 103 BPM | SYSTOLIC BLOOD PRESSURE: 155 MMHG | RESPIRATION RATE: 16 BRPM

## 2022-02-15 LAB
ALBUMIN SERPL ELPH-MCNC: 4.2 G/DL — SIGNIFICANT CHANGE UP (ref 3.5–5.2)
ALP SERPL-CCNC: 72 U/L — SIGNIFICANT CHANGE UP (ref 30–115)
ALT FLD-CCNC: 19 U/L — SIGNIFICANT CHANGE UP (ref 0–41)
ANION GAP SERPL CALC-SCNC: 11 MMOL/L — SIGNIFICANT CHANGE UP (ref 7–14)
AST SERPL-CCNC: 21 U/L — SIGNIFICANT CHANGE UP (ref 0–41)
BASOPHILS # BLD AUTO: 0.04 K/UL — SIGNIFICANT CHANGE UP (ref 0–0.2)
BASOPHILS NFR BLD AUTO: 0.2 % — SIGNIFICANT CHANGE UP (ref 0–1)
BILIRUB SERPL-MCNC: 0.4 MG/DL — SIGNIFICANT CHANGE UP (ref 0.2–1.2)
BUN SERPL-MCNC: 20 MG/DL — SIGNIFICANT CHANGE UP (ref 10–20)
CALCIUM SERPL-MCNC: 9.6 MG/DL — SIGNIFICANT CHANGE UP (ref 8.5–10.1)
CHLORIDE SERPL-SCNC: 102 MMOL/L — SIGNIFICANT CHANGE UP (ref 98–110)
CO2 SERPL-SCNC: 26 MMOL/L — SIGNIFICANT CHANGE UP (ref 17–32)
CREAT SERPL-MCNC: 0.6 MG/DL — LOW (ref 0.7–1.5)
EOSINOPHIL # BLD AUTO: 0.03 K/UL — SIGNIFICANT CHANGE UP (ref 0–0.7)
EOSINOPHIL NFR BLD AUTO: 0.2 % — SIGNIFICANT CHANGE UP (ref 0–8)
GLUCOSE SERPL-MCNC: 116 MG/DL — HIGH (ref 70–99)
HCT VFR BLD CALC: 44.1 % — SIGNIFICANT CHANGE UP (ref 37–47)
HGB BLD-MCNC: 14.4 G/DL — SIGNIFICANT CHANGE UP (ref 12–16)
IMM GRANULOCYTES NFR BLD AUTO: 0.4 % — HIGH (ref 0.1–0.3)
LYMPHOCYTES # BLD AUTO: 1.38 K/UL — SIGNIFICANT CHANGE UP (ref 1.2–3.4)
LYMPHOCYTES # BLD AUTO: 8.1 % — LOW (ref 20.5–51.1)
MAGNESIUM SERPL-MCNC: 1.9 MG/DL — SIGNIFICANT CHANGE UP (ref 1.8–2.4)
MCHC RBC-ENTMCNC: 29.8 PG — SIGNIFICANT CHANGE UP (ref 27–31)
MCHC RBC-ENTMCNC: 32.7 G/DL — SIGNIFICANT CHANGE UP (ref 32–37)
MCV RBC AUTO: 91.3 FL — SIGNIFICANT CHANGE UP (ref 81–99)
MONOCYTES # BLD AUTO: 1.37 K/UL — HIGH (ref 0.1–0.6)
MONOCYTES NFR BLD AUTO: 8.1 % — SIGNIFICANT CHANGE UP (ref 1.7–9.3)
NEUTROPHILS # BLD AUTO: 14.11 K/UL — HIGH (ref 1.4–6.5)
NEUTROPHILS NFR BLD AUTO: 83 % — HIGH (ref 42.2–75.2)
NRBC # BLD: 0 /100 WBCS — SIGNIFICANT CHANGE UP (ref 0–0)
PLATELET # BLD AUTO: 284 K/UL — SIGNIFICANT CHANGE UP (ref 130–400)
POTASSIUM SERPL-MCNC: 4.2 MMOL/L — SIGNIFICANT CHANGE UP (ref 3.5–5)
POTASSIUM SERPL-SCNC: 4.2 MMOL/L — SIGNIFICANT CHANGE UP (ref 3.5–5)
PROT SERPL-MCNC: 6.8 G/DL — SIGNIFICANT CHANGE UP (ref 6–8)
RBC # BLD: 4.83 M/UL — SIGNIFICANT CHANGE UP (ref 4.2–5.4)
RBC # FLD: 13 % — SIGNIFICANT CHANGE UP (ref 11.5–14.5)
SARS-COV-2 RNA SPEC QL NAA+PROBE: SIGNIFICANT CHANGE UP
SODIUM SERPL-SCNC: 139 MMOL/L — SIGNIFICANT CHANGE UP (ref 135–146)
WBC # BLD: 16.99 K/UL — HIGH (ref 4.8–10.8)
WBC # FLD AUTO: 16.99 K/UL — HIGH (ref 4.8–10.8)

## 2022-02-15 PROCEDURE — 70486 CT MAXILLOFACIAL W/O DYE: CPT | Mod: 26,MA

## 2022-02-15 PROCEDURE — 71045 X-RAY EXAM CHEST 1 VIEW: CPT | Mod: 26

## 2022-02-15 PROCEDURE — 99285 EMERGENCY DEPT VISIT HI MDM: CPT | Mod: 25

## 2022-02-15 PROCEDURE — 73562 X-RAY EXAM OF KNEE 3: CPT | Mod: 26,RT

## 2022-02-15 PROCEDURE — 72125 CT NECK SPINE W/O DYE: CPT | Mod: 26,MA

## 2022-02-15 PROCEDURE — 99221 1ST HOSP IP/OBS SF/LOW 40: CPT

## 2022-02-15 PROCEDURE — 12013 RPR F/E/E/N/L/M 2.6-5.0 CM: CPT

## 2022-02-15 PROCEDURE — 70450 CT HEAD/BRAIN W/O DYE: CPT | Mod: 26,MA

## 2022-02-15 PROCEDURE — 72170 X-RAY EXAM OF PELVIS: CPT | Mod: 26

## 2022-02-15 PROCEDURE — 73130 X-RAY EXAM OF HAND: CPT | Mod: 26,RT

## 2022-02-15 RX ORDER — ACETAMINOPHEN 500 MG
650 TABLET ORAL EVERY 6 HOURS
Refills: 0 | Status: DISCONTINUED | OUTPATIENT
Start: 2022-02-15 | End: 2022-02-20

## 2022-02-15 RX ORDER — ONDANSETRON 8 MG/1
4 TABLET, FILM COATED ORAL EVERY 8 HOURS
Refills: 0 | Status: DISCONTINUED | OUTPATIENT
Start: 2022-02-15 | End: 2022-02-20

## 2022-02-15 RX ORDER — LANOLIN ALCOHOL/MO/W.PET/CERES
3 CREAM (GRAM) TOPICAL AT BEDTIME
Refills: 0 | Status: DISCONTINUED | OUTPATIENT
Start: 2022-02-15 | End: 2022-02-20

## 2022-02-15 RX ORDER — ACETAMINOPHEN 500 MG
650 TABLET ORAL ONCE
Refills: 0 | Status: COMPLETED | OUTPATIENT
Start: 2022-02-15 | End: 2022-02-15

## 2022-02-15 RX ORDER — LATANOPROST 0.05 MG/ML
1 SOLUTION/ DROPS OPHTHALMIC; TOPICAL
Qty: 0 | Refills: 0 | DISCHARGE

## 2022-02-15 RX ORDER — TETANUS TOXOID, REDUCED DIPHTHERIA TOXOID AND ACELLULAR PERTUSSIS VACCINE, ADSORBED 5; 2.5; 8; 8; 2.5 [IU]/.5ML; [IU]/.5ML; UG/.5ML; UG/.5ML; UG/.5ML
0.5 SUSPENSION INTRAMUSCULAR ONCE
Refills: 0 | Status: COMPLETED | OUTPATIENT
Start: 2022-02-15 | End: 2022-02-15

## 2022-02-15 RX ORDER — CHLORHEXIDINE GLUCONATE 213 G/1000ML
1 SOLUTION TOPICAL
Refills: 0 | Status: DISCONTINUED | OUTPATIENT
Start: 2022-02-15 | End: 2022-02-20

## 2022-02-15 RX ORDER — METOPROLOL TARTRATE 50 MG
1 TABLET ORAL
Qty: 0 | Refills: 0 | DISCHARGE

## 2022-02-15 RX ADMIN — Medication 650 MILLIGRAM(S): at 23:33

## 2022-02-15 RX ADMIN — Medication 650 MILLIGRAM(S): at 19:58

## 2022-02-15 RX ADMIN — Medication 650 MILLIGRAM(S): at 11:47

## 2022-02-15 RX ADMIN — TETANUS TOXOID, REDUCED DIPHTHERIA TOXOID AND ACELLULAR PERTUSSIS VACCINE, ADSORBED 0.5 MILLILITER(S): 5; 2.5; 8; 8; 2.5 SUSPENSION INTRAMUSCULAR at 11:47

## 2022-02-15 NOTE — ED ADULT NURSE NOTE - CHIEF COMPLAINT QUOTE
Patient BIBA s/p fall at [employer]. Patient is c/o pain to right knee, right wrist, and a laceration to noe. Patient denies LOC

## 2022-02-15 NOTE — ED PROVIDER NOTE - NS ED ROS FT
Constitutional: See HPI.  Eyes: No visual changes, eye pain or discharge.   ENMT: No hearing changes, pain, discharge or infections. No neck pain or stiffness. No limited ROM  Cardiac: No SOB or edema. No chest pain with exertion.  Respiratory: No cough or respiratory distress.   GI: No nausea, vomiting, diarrhea or abdominal pain.  : No dysuria, frequency or burning. No Discharge  MS: + knee pain. No myalgia, muscle weakness, joint pain or back pain.  Neuro: No headache or weakness. No LOC.  Skin: + lacerations.   Except as documented in the HPI, all other systems are negative.

## 2022-02-15 NOTE — ED ADULT TRIAGE NOTE - CHIEF COMPLAINT QUOTE
Patient BIBA s/p fall at [employer]. Patient is c/o pain to right knee, right wrist, and a laceration to noe. Patient denies LOC

## 2022-02-15 NOTE — ED PROVIDER NOTE - OBJECTIVE STATEMENT
89 y.o female w/ hx of bronchiectasis, HTN presents to the ED for evaluation of fall.  mechanical trip and fall at [employer] w/ + head injury, no LOC. no anticoagulation or antiplatlets.  Sustained 2 lacerations to face and 1 inside lip.  also reports R knee pain. no further complaints. 89 y.o female w/ hx of bronchiectasis, HTN presents to the ED for evaluation of fall.  mechanical trip and fall at [employer] w/ + head injury, no LOC. no anticoagulation or antiplatelets.  Sustained 2 lacerations to face and 1 inside lip.  also reports R knee pain. no further complaints.

## 2022-02-15 NOTE — ED PROVIDER NOTE - CARE PROVIDER_API CALL
Dax Frey (MD)  Internal Medicine  [address] [place] [address]  Phone: [phone]  Fax: [phone]  Follow Up Time: 1-3 Days    Lon Fulton)  Orthopaedic Surgery  [address] [place] [address]  Phone: [phone]  Fax: [phone]  Follow Up Time: 1-3 Days

## 2022-02-15 NOTE — H&P ADULT - NSHPPHYSICALEXAM_GEN_ALL_CORE
PHYSICAL EXAM:  Vital Signs Last 24 Hrs  T(F): 97.9 (02-15-22 @ 14:47), Max: 97.9 (02-15-22 @ 14:47)  HR: 91 (02-15-22 @ 14:47) (91 - 103)  BP: 141/81 (02-15-22 @ 14:47)  RR: 16 (02-15-22 @ 14:47) (16 - 16)  SpO2: 97% (02-15-22 @ 14:47) (95% - 97%)      Constitutional: NAD, A&O x3, hard of hearing. Non-diaphoretic, non-toxic appearing, speaking in full sentences   HEENT: 2 sutures on face. PERRL. EOMI. Sclera white.   Respiratory: +air entry, no rales, no rhonchi, no wheezes  Cardiovascular: +S1 and S2, regular rate and rhythm. No murmurs, rubs, gallops.  Gastrointestinal: +BS, soft, non-tender, not distended  Extremities:  no edema, no calf tenderness. +ttp over right knee and right wrist. No gross deformity.   Vascular: +dorsal pedis and radial pulses, no extremity cyanosis  Neurological: sensation intact, ROM equal B/L, CN II-XII intact  Skin: no rashes, normal turgor

## 2022-02-15 NOTE — H&P ADULT - NSHPLABSRESULTS_GEN_ALL_CORE
14.4   16.99 )-----------( 284      ( 15 Feb 2022 15:42 )             44.1       02-15    139  |  102  |  20  ----------------------------<  116<H>  4.2   |  26  |  0.6<L>    Ca    9.6      15 Feb 2022 15:42  Mg     1.9     02-15    TPro  6.8  /  Alb  4.2  /  TBili  0.4  /  DBili  x   /  AST  21  /  ALT  19  /  AlkPhos  72  02-15        < from: Xray Hand 3 Views, Right (02.15.22 @ 13:53) >    No acute displaced fracture or dislocation.    Degenerative change      < end of copied text >    < from: Xray Knee 3 Views, Right (02.15.22 @ 13:53) >      Soft tissue swelling and joint effusion.    No acute displaced fracture dislocation.    Degenerative change.    Vascular calcifications.    < end of copied text >    < from: CT Maxillofacial No Cont (02.15.22 @ 12:57) >    CT HEAD:  No acute intracranial pathology or hemorrhage. No acute calvarial   fracture.    Mild chronic microvascular type changes as well as an age-indeterminate   lacunar infarct involving the right thalamus.    Partially calcified 1 cm mass overlying the anterior right frontal lobe   possibly representing a meningioma.    MAXILLOFACIAL BONES:  Soft tissue swelling involving the left medial periorbital, supraorbital   as well as the premaxillary soft tissues with trace subcutaneous air   noted involving the left nasal/periorbital tissues. No evidence of   fracture.    CT CERVICAL SPINE:  No acute fracture or subluxation.    Bilateral thyroid lobe nodules with a prominent 2 cm calcified right   thyroid lobe nodule which can be further evaluated with nonemergent   thyroid ultrasound.    Nonspecific subcutaneous soft tissue nodule in the left aspect and neck   at the level hyoid. This may represent a lymph node or possibly a   sebaceous cyst for which visible examination is advised.    < end of copied text >

## 2022-02-15 NOTE — ED PROVIDER NOTE - CLINICAL SUMMARY MEDICAL DECISION MAKING FREE TEXT BOX
89-year-old female history of hypertension presenting status post mechanical trip and fall.  Positive head injury no LOC no anticoagulation.  Also injured right wrist and right knee.  No other injuries or acute complaints.  Well appearing, NAD, non toxic. + Forehead laceration, L cheek laceration and left mucosal laceration.  Mild bleeding around the gingiva left maxillary region.  No laxity of teeth.  No tongue lacerations.  PERRLA EOMI neck supple non tender normal wob cta bl rrr abdomen s nt nd no rebound no guarding WWPx4 neuro non focal 2+ equal pulses throughout less than 2-second capillary refill throughout.+ Tenderness to palpation right knee.  Mild effusion.  Unknown last tetanus.  Imaging reviewed.  Lacerations repaired.  Aware of all results, given a copy of all available results, comfortable with discharge and follow-up outpatient, strict return precautions given. Endorses understanding and aware they can return at any time for further evaluation. No further questions or concerns at this time. 89-year-old female history of hypertension presenting status post mechanical trip and fall.  Positive head injury no LOC no anticoagulation.  Also injured right wrist and right knee.  No other injuries or acute complaints.  Well appearing, NAD, non toxic. + Forehead laceration, L cheek laceration and left mucosal laceration.  Mild bleeding around the gingiva left maxillary region.  No laxity of teeth.  No tongue lacerations.  PERRLA EOMI neck supple non tender normal wob cta bl rrr abdomen s nt nd no rebound no guarding WWPx4 neuro non focal 2+ equal pulses throughout less than 2-second capillary refill throughout.+ Tenderness to palpation right knee.  Mild effusion.  Unknown last tetanus.  Imaging reviewed.  Lacerations repaired.  Patient unable to ambulate.  Will admit for further evaluation.

## 2022-02-15 NOTE — ED PROVIDER NOTE - NSFOLLOWUPINSTRUCTIONS_ED_ALL_ED_FT
Head Injury    WHAT YOU NEED TO KNOW:    A head injury is most often caused by a blow to the head. This may occur from a fall, bicycle injury, sports injury, being struck in the head, or a motor vehicle accident.     DISCHARGE INSTRUCTIONS:    Call 911 or have someone else call for any of the following:     You cannot be woken.      You have a seizure.      You stop responding to others or you faint.      You have blurry or double vision.      Your speech becomes slurred or confused.      You have arm or leg weakness, loss of feeling, or new problems with coordination.      Your pupils are larger than usual or one pupil is a different size than the other.       You have blood or clear fluid coming out of your ears or nose.    Return to the emergency department if:     You have repeated or forceful vomiting.      You feel confused.      Your headache gets worse or becomes severe.      You or someone caring for you notices that you are harder to wake than usual.    Contact your healthcare provider if:     Your symptoms last longer than 6 weeks after the injury.      You have questions or concerns about your condition or care.    Medicines:     Acetaminophen decreases pain. Acetaminophen is available without a doctor's order. Ask how much to take and how often to take it. Follow directions. Acetaminophen can cause liver damage if not taken correctly.      Take your medicine as directed. Contact your healthcare provider if you think your medicine is not helping or if you have side effects. Tell him or her if you are allergic to any medicine. Keep a list of the medicines, vitamins, and herbs you take. Include the amounts, and when and why you take them. Bring the list or the pill bottles to follow-up visits. Carry your medicine list with you in case of an emergency.    Self-care:     Rest or do quiet activities for 24 to 48 hours. Limit your time watching TV, using the computer, or doing tasks that require a lot of thinking. Slowly return to your normal activities as directed. Do not play sports or do activities that may cause you to get hit in the head. Ask your healthcare provider when you can return to sports.       Apply ice on your head for 15 to 20 minutes every hour or as directed. Use an ice pack, or put crushed ice in a plastic bag. Cover it with a towel before you apply it to your skin. Ice helps prevent tissue damage and decreases swelling and pain.       Have someone stay with you for 24 hours or as directed. This person can monitor you for complications and call 911. When you are awake the person should ask you a few questions to see if you are thinking clearly. An example would be to ask your name or your address.     Prevent another head injury:     Wear a helmet that fits properly. Do this when you play sports, or ride a bike, scooter, or skateboard. Helmets help decrease your risk of a serious head injury. Talk to your healthcare provider about other ways you can protect yourself if you play sports.      Wear your seat belt every time you are in a car. This helps to decrease your risk for a head injury if you are in a car accident.     Follow up with your healthcare provider as directed: Write down your questions so you remember to ask them during your visits.    Laceration    A laceration is a cut that goes through all of the layers of the skin and into the tissue that is right under the skin. Some lacerations heal on their own. Others need to be closed with skin adhesive strips, skin glue, stitches (sutures), or staples. Proper laceration care minimizes the risk of infection and helps the laceration to heal better.  If non-absorbable stitches or staples have been placed, they must be taken out within the time frame instructed by your healthcare provider.    SEEK IMMEDIATE MEDICAL CARE IF YOU HAVE ANY OF THE FOLLOWING SYMPTOMS: swelling around the wound, worsening pain, drainage from the wound, red streaking going away from your wound, inability to move finger or toe near the laceration, or discoloration of skin near the laceration.  Suture removal in 5-7 days    Knee Pain    Knee pain is a very common symptom and can have many causes. Knee pain often goes away when you follow your health care provider's instructions for relieving pain and discomfort at home. However, knee pain can develop into a condition that needs treatment. Some conditions may include:     Arthritis caused by wear and tear (osteoarthritis).  Arthritis caused by swelling and irritation (rheumatoid arthritis or gout).  A cyst or growth in your knee.  An infection in your knee joint.  An injury that will not heal.  Damage, swelling, or irritation of the tissues that support your knee (torn ligaments or tendinitis).    If your knee pain continues, additional tests may be ordered to diagnose your condition. Tests may include X-rays or other imaging studies of your knee. You may also need to have fluid removed from your knee. Treatment for ongoing knee pain depends on the cause, but treatment may include:    Medicines to relieve pain or swelling.  Steroid injections in your knee.  Physical therapy.  Surgery.    HOME CARE INSTRUCTIONS  Take medicines only as directed by your health care provider.   Rest your knee and keep it raised (elevated) while you are resting.  Do not do things that cause or worsen pain.  Avoid high-impact activities or exercises, such as running, jumping rope, or doing jumping jacks.  Apply ice to the knee area:  Put ice in a plastic bag.  Place a towel between your skin and the bag.  Leave the ice on for 20 minutes, 2–3 times a day.  Ask your health care provider if you should wear an elastic knee support.  Keep a pillow under your knee when you sleep.  Lose weight if you are overweight. Extra weight can put pressure on your knee.  Do not use any tobacco products, including cigarettes, chewing tobacco, or electronic cigarettes. If you need help quitting, ask your health care provider. Smoking may slow the healing of any bone and joint problems that you may have.    SEEK MEDICAL CARE IF:  Your knee pain continues, changes, or gets worse.  You have a fever along with knee pain.  Your knee megan or locks up.  Your knee becomes more swollen.    SEEK IMMEDIATE MEDICAL CARE IF:  Your knee joint feels hot to the touch.  You have chest pain or trouble breathing.    Please follow up with your doctor about Bilateral thyroid lobe nodules with a prominent 2 cm calcified right   thyroid lobe nodule which can be further evaluated with nonemergent   thyroid ultrasound.    Nonspecific subcutaneous soft tissue nodule in the left aspect and neck   at the level hyoid. This may represent a lymph node or possibly a   sebaceous cyst for which visible examination is advised.

## 2022-02-15 NOTE — PATIENT PROFILE ADULT - FALL HARM RISK - HARM RISK INTERVENTIONS

## 2022-02-15 NOTE — ED PROVIDER NOTE - PHYSICAL EXAMINATION
CONST: Well appearing in NAD  EYES:  Sclera and conjunctiva clear.  ENT: no dental tenderness, no loose teeth, no tongue laceration.   NECK: Non-tender, no meningeal signs, supple, no midline tenderness  CARD: Normal S1 S2; Normal rate and rhythm  RESP: Equal BS B/L, No wheezes, rhonchi or rales. No distress  GI: Soft, non-tender, non-distended.  MS: + TTP and effusion R knee, no laxity, mild TTP dorsum of R hand, no snuff box tenderness, Normal ROM in all extremities. No edema of lower extremities, no calf pain, pedal/radial pulses 2+ bilaterally  SKIN: 1.5 cm laceration L forehead, 1 cm laceration L cheek, 0.5 cm lac inside of L upper lip, no FB.   NEURO: A&Ox3, No focal deficits. Strength 5/5 with no sensory deficits. Steady gait

## 2022-02-15 NOTE — H&P ADULT - HISTORY OF PRESENT ILLNESS
Pt is a 89 yo F PMH HTN, Bronchiectasis, Thoracic Aortic Aneurysm who presented to the ER s/p mechanical trip and fall. Patient reports she was at [employer] when someone stepped in front of her, she tried to move out of the way, and fell forwards. Reports her face hit the pew. Reports right wrist and right knee pain. Patient reports she lives alone but has help from a neighbor upstairs. Walks independently however in the ER patient was unsteady on her feet and was admitted for inability to ambulate. Reports a chronic cough from post-nasal drip. Denies fever/chills, CP, SOB, abdominal pain, calf pain/edema, dysuria.

## 2022-02-15 NOTE — ED PROVIDER NOTE - PATIENT PORTAL LINK FT
You can access the FollowMyHealth Patient Portal offered by [facility] by registering at the following website: [email or web address]. By joining [facility]’s FollowMyHealth portal, you will also be able to view your health information using other applications (apps) compatible with our system.

## 2022-02-15 NOTE — PATIENT PROFILE ADULT - FALL HARM RISK - FALLEN IN PAST
Accidental fall Complex Repair And Double M Plasty Text: The defect edges were debeveled with a #15 scalpel blade.  The primary defect was closed partially with a complex linear closure.  Given the location of the remaining defect, shape of the defect and the proximity to free margins a double M plasty was deemed most appropriate for complete closure of the defect.  Using a sterile surgical marker, an appropriate advancement flap was drawn incorporating the defect and placing the expected incisions within the relaxed skin tension lines where possible.    The area thus outlined was incised deep to adipose tissue with a #15 scalpel blade.  The skin margins were undermined to an appropriate distance in all directions utilizing iris scissors.

## 2022-02-15 NOTE — ED PROVIDER NOTE - PROVIDER TOKENS
PROVIDER:[TOKEN:[[id number]],FOLLOWUP:[1-3 Days]],PROVIDER:[TOKEN:[[id number]],FOLLOWUP:[1-3 Days]]

## 2022-02-15 NOTE — ED PROVIDER NOTE - CARE PLAN
Principal Discharge DX:	Head injury  Secondary Diagnosis:	Laceration of face  Secondary Diagnosis:	Lip laceration  Secondary Diagnosis:	Right knee pain   1 Principal Discharge DX:	Head injury  Secondary Diagnosis:	Laceration of face  Secondary Diagnosis:	Lip laceration  Secondary Diagnosis:	Right knee pain  Secondary Diagnosis:	Ambulatory dysfunction

## 2022-02-15 NOTE — ED PROVIDER NOTE - PROGRESS NOTE DETAILS
wounds irrigated, sutured and dressed. pt not able to ambulate 2/2 pain.  very unsteady and almost fell multiple times in ED.  will admit for further eval.

## 2022-02-15 NOTE — H&P ADULT - NSHPSOCIALHISTORY_GEN_ALL_CORE
Tobacco use: Denies  EtOH use: Denies  Illicit drug use: Denies  Lives alone. Son lives in [place], daughter lives in [place]

## 2022-02-15 NOTE — H&P ADULT - ASSESSMENT
89 yo F PMH HTN, Bronchiectasis, Thoracic Aortic Aneurysm admitted for inability to ambulate    #Inability to ambulate s/p mechanical fall   -Trauma w/u negative   -Rehab consult  -Pt/Ot  -Patient lives alone (son lives in [place], daughter in [place]). She is not interested in placement, but would like [facility]     #Leukocytosis   -WBC 16.99  -Denies fever/chills, cough, dysuria. Likely reactive  -Will repeat CBC in AM     #HTN  -Continue Losartan     #Incidental Right frontal meningioma  -Outpatient f/u    #Bronchiectasis   -Outpatient f/u     #Thoracic Aortic Aneurysm  -Patient reports no additional follow ups needed because it's unchanged    D/w Dr. Sanderson    89 yo F PMH HTN, Bronchiectasis, Thoracic Aortic Aneurysm admitted for inability to ambulate    #Inability to ambulate s/p mechanical fall   -Trauma w/u negative   -Rehab consult  -Pt/Ot  -Patient lives alone (son lives in [place], daughter in [place]). She is not interested in placement, but would like [facility]     #Leukocytosis   -WBC 16.99  -Denies fever/chills, cough, dysuria. Likely reactive  -Will repeat CBC in AM     #HTN  -Continue Losartan     #Incidental Right frontal meningioma  -Outpatient f/u    #Bronchiectasis   -Outpatient f/u     #Thoracic Aortic Aneurysm  -Patient reports no additional follow ups needed because it's unchanged

## 2022-02-15 NOTE — ED PROCEDURE NOTE - PROCEDURE ADDITIONAL DETAILS
#1- 1.5 cm L forehead 5 x 6-0 nylon  #2- 1 cm L cheek 4 x 6-0 nylon  #3 - 0.5 cm laceration L upper inner lip, 1 x 3-0 chromic

## 2022-02-15 NOTE — H&P ADULT - NSICDXPASTSURGICALHX_GEN_ALL_CORE_FT
PAST SURGICAL HISTORY:  No significant past surgical history Excisions of skin cancer on left arm

## 2022-02-16 ENCOUNTER — TRANSCRIPTION ENCOUNTER (OUTPATIENT)
Age: 87
End: 2022-02-16

## 2022-02-16 LAB
HCT VFR BLD CALC: 42.5 % — SIGNIFICANT CHANGE UP (ref 37–47)
HGB BLD-MCNC: 13.7 G/DL — SIGNIFICANT CHANGE UP (ref 12–16)
MCHC RBC-ENTMCNC: 29.8 PG — SIGNIFICANT CHANGE UP (ref 27–31)
MCHC RBC-ENTMCNC: 32.2 G/DL — SIGNIFICANT CHANGE UP (ref 32–37)
MCV RBC AUTO: 92.4 FL — SIGNIFICANT CHANGE UP (ref 81–99)
NRBC # BLD: 0 /100 WBCS — SIGNIFICANT CHANGE UP (ref 0–0)
PLATELET # BLD AUTO: 257 K/UL — SIGNIFICANT CHANGE UP (ref 130–400)
RBC # BLD: 4.6 M/UL — SIGNIFICANT CHANGE UP (ref 4.2–5.4)
RBC # FLD: 13.1 % — SIGNIFICANT CHANGE UP (ref 11.5–14.5)
WBC # BLD: 11.81 K/UL — HIGH (ref 4.8–10.8)
WBC # FLD AUTO: 11.81 K/UL — HIGH (ref 4.8–10.8)

## 2022-02-16 PROCEDURE — 99233 SBSQ HOSP IP/OBS HIGH 50: CPT

## 2022-02-16 RX ORDER — LOSARTAN POTASSIUM 100 MG/1
50 TABLET, FILM COATED ORAL DAILY
Refills: 0 | Status: DISCONTINUED | OUTPATIENT
Start: 2022-02-16 | End: 2022-02-20

## 2022-02-16 RX ORDER — LIDOCAINE 4 G/100G
1 CREAM TOPICAL THREE TIMES A DAY
Refills: 0 | Status: DISCONTINUED | OUTPATIENT
Start: 2022-02-16 | End: 2022-02-20

## 2022-02-16 RX ADMIN — LIDOCAINE 1 APPLICATION(S): 4 CREAM TOPICAL at 21:42

## 2022-02-16 RX ADMIN — CHLORHEXIDINE GLUCONATE 1 APPLICATION(S): 213 SOLUTION TOPICAL at 06:34

## 2022-02-16 RX ADMIN — Medication 650 MILLIGRAM(S): at 01:00

## 2022-02-16 RX ADMIN — LOSARTAN POTASSIUM 50 MILLIGRAM(S): 100 TABLET, FILM COATED ORAL at 14:21

## 2022-02-16 RX ADMIN — LIDOCAINE 1 APPLICATION(S): 4 CREAM TOPICAL at 14:03

## 2022-02-16 RX ADMIN — Medication 100 MILLIGRAM(S): at 17:30

## 2022-02-16 NOTE — DISCHARGE NOTE PROVIDER - HOSPITAL COURSE
Pt is a 89 yo F PMH HTN, Bronchiectasis, Thoracic Aortic Aneurysm who presented to the ER s/p mechanical trip and fall. Patient sustained facial injury. Patient was admitted for inability to ambulate. Physiatry recommends short term rehab vs home with [facility]. Physical therapy walked with the patient and states she ambulates well and almost independently.    Pt is a 89 yo F PMH HTN, Bronchiectasis, Thoracic Aortic Aneurysm who presented to the ER s/p mechanical trip and fall. Patient sustained facial injury. Patient was admitted for inability to ambulate. Physiatry recommends short term rehab vs home with [facility].  pt is awake, alert, feeling better and ready for dc to rehab     89 yo F PMH HTN, Bronchiectasis, Thoracic Aortic Aneurysm admitted for inability to ambulate    #s/p mechanical fall sustaining facial laceration status post sutures, rt knee bruise/contusion  -Trauma w/u negative for acute fractures  -Patient lives alone (son lives in [place], daughter in [place]). She is not interested in placement, but would like HHA   -ice nirali to rt knee contusion  -ambulated with pt with the use of walker    #Leukocytosis   -WBC 16.99 >11.8  -mild cellulitis noted distral rt forearm near wrist.  improving significantly   -continue doxy po x 3 more days ( last day 2/20)    #HTN  -Continue Losartan     #Incidental Right frontal meningioma  -recommended Outpatient f/u    #Bronchiectasis   -no resp distress  -Outpatient f/u     #Thoracic Aortic Aneurysm  -bp stable, no chest pain  -Patient reports no additional follow ups needed because it's unchanged     Disp  stable for dc today

## 2022-02-16 NOTE — OCCUPATIONAL THERAPY INITIAL EVALUATION ADULT - GENERAL OBSERVATIONS, REHAB EVAL
13:15-13:45 chart reviewed, ok to treat by Occupational Therapist as confirmed by RN Simin, Pt received seated in bedside chair with friend present +ace wrap on right knee in NAD. Pt in agreement with OT IE.

## 2022-02-16 NOTE — PHYSICAL THERAPY INITIAL EVALUATION ADULT - RANGE OF MOTION EXAMINATION, REHAB EVAL
R  knee held in 40 degrees of flexion during amb due to pain, PROM extension limited approx 20 degrees due to guarding

## 2022-02-16 NOTE — SPEECH LANGUAGE PATHOLOGY EVALUATION - COMMENTS
OLEKSANDR WNL Pt hard of hearing at baseline. Has hearing aids. Pt requested usual medications from nurse, noticed they were a different color than normal. Very aware of surroundings and makes all wants/needs known without any difficulty. WNL for age and living situation. aware of surroundings and makes all wants/needs known without any difficulty.

## 2022-02-16 NOTE — SPEECH LANGUAGE PATHOLOGY EVALUATION - SLP PERTINENT HISTORY OF CURRENT PROBLEM
89 yo F PMH HTN, Bronchiectasis, Thoracic Aortic Aneurysm admitted for inability to ambulate. #s/p mechanical fall sustaining facial laceration status post sutures, rt knee bruise/contusion. Trauma w/u negative for acute fractures. Mild chronic microvascular type changes as well as an age-indeterminate lacunar infarct involving the right thalamus. Partially calcified 1 cm mass overlying the anterior right frontal lobe possibly representing a meningioma.

## 2022-02-16 NOTE — DISCHARGE NOTE PROVIDER - NSDCCPCAREPLAN_GEN_ALL_CORE_FT
PRINCIPAL DISCHARGE DIAGNOSIS  Diagnosis: Head injury  Assessment and Plan of Treatment: -  - S/P Fall. Physiatry recommends STR vs Home with Home PT. Physical therapy walked with you.

## 2022-02-16 NOTE — PHYSICAL THERAPY INITIAL EVALUATION ADULT - GENERAL OBSERVATIONS, REHAB EVAL
11:30-11:55 Chart reviewed. Patient available to be seen for physical therapy, reports pain in R knee, notified RN.  Pt rec'd in bed in NAD, friend at bedside.

## 2022-02-16 NOTE — CONSULT NOTE ADULT - SUBJECTIVE AND OBJECTIVE BOX
HPI: 89 yo F PMH HTN, Bronchiectasis, Thoracic Aortic Aneurysm who presented to the ER s/p mechanical trip and fall. Patient reports she was at [employer] when someone stepped in front of her, she tried to move out of the way, and fell forwards. Reports her face hit the pew. Reports right wrist and right knee pain. Patient reports she lives alone but has help from a neighbor upstairs. Walks independently however in the ER patient was unsteady on her feet and was admitted for inability to ambulate. Reports a chronic cough from post-nasal drip. Denies fever/chills, CP, SOB, abdominal pain, calf pain/edema, dysuria. ptn seen and  exam at  bed  ptn  is [place] aox3  nad  fu command notice  lt  face  upper  eye  brow skin and  laceration  and  under  lt eye skin laceration  ecchymosis     PTN  REFERRED TO ACUTE  REHAB  FOR  EVAL AND  TX   PAST MEDICAL & SURGICAL HISTORY:  Bronchiectasis    Aneurysm of aorta  Throacic    HTN (hypertension)    No significant past surgical history  Excisions of skin cancer on left arm        Hospital Course:    TODAY'S SUBJECTIVE & REVIEW OF SYMPTOMS:     Constitutional WNL   Cardio WNL   Resp WNL   GI WNL  Heme WNL  Endo WNL  Skin WNL  MSK WNL  Neuro WNL  Cognitive WNL  Psych WNL      MEDICATIONS  (STANDING):  chlorhexidine 2% Cloths 1 Application(s) Topical <User Schedule>    MEDICATIONS  (PRN):  acetaminophen     Tablet .. 650 milliGRAM(s) Oral every 6 hours PRN Temp greater or equal to 38C (100.4F), Mild Pain (1 - 3)  aluminum hydroxide/magnesium hydroxide/simethicone Suspension 30 milliLiter(s) Oral every 4 hours PRN Dyspepsia  melatonin 3 milliGRAM(s) Oral at bedtime PRN Insomnia  ondansetron Injectable 4 milliGRAM(s) IV Push every 8 hours PRN Nausea and/or Vomiting      FAMILY HISTORY:      Allergies    penicillin (Eye Irritation)  sulfa drugs (Eye Irritation)    Intolerances        SOCIAL HISTORY:    [  ] Etoh  [  ] Smoking  [  ] Substance abuse     Home Environment:  [x  ] Home Alone  [  ] Lives with Family  [  ] Home Health Aid    Dwelling:  [  ] Apartment  [ x ] Private House  [  ] Adult Home  [  ] Skilled Nursing Facility      [  ] Short Term  [  ] Long Term  [ x ] Stairs       Elevator [  ]    FUNCTIONAL STATUS PTA: (Check all that apply)  Ambulation: [ x  ]Independent    [  ] Dependent     [  ] Non-Ambulatory  Assistive Device: [  ] SA Cane  [  ]  Q Cane  [  ] Walker  [  ]  Wheelchair  ADL : [ x ] Independent  [  ]  Dependent       Vital Signs Last 24 Hrs  T(C): 35.7 (16 Feb 2022 04:59), Max: 36.8 (15 Feb 2022 20:40)  T(F): 96.3 (16 Feb 2022 04:59), Max: 98.2 (15 Feb 2022 20:40)  HR: 83 (16 Feb 2022 04:59) (83 - 112)  BP: 133/74 (16 Feb 2022 04:59) (133/74 - 159/86)  BP(mean): --  RR: 17 (16 Feb 2022 04:59) (16 - 18)  SpO2: 96% (15 Feb 2022 17:50) (95% - 97%)      PHYSICAL EXAM: Alert & Oriented X3  GENERAL: NAD, well-groomed, well-developed  HEAD:  Atraumatic, Normocephalic  EYES: EOMI, PERRLA, conjunctiva and sclera clear  NECK: Supple, No JVD, Normal thyroid  CHEST/LUNG: Clear to percussion bilaterally; No rales, rhonchi, wheezing, or rubs  HEART: Regular rate and rhythm; No murmurs, rubs, or gallops  ABDOMEN: Soft, Nontender, Nondistended; Bowel sounds present  EXTREMITIES:  2+ Peripheral Pulses, No clubbing, cyanosis, or edema    NERVOUS SYSTEM:  Cranial Nerves 2-12 intact [x  ] Abnormal  [  ]  ROM: WFL all extremities [x  ]  Abnormal [  ]  Motor Strength: WFL all extremities  [ x ]  Abnormal [  ]  Sensation: intact to light touch [ x ] Abnormal [  ]  Reflexes: Symmetric [x  ]  Abnormal [  ]    FUNCTIONAL STATUS:  Bed Mobility: Independent [  ]  Supervision [  ]  Needs Assistance [  ]  N/A [ x ]  Transfers: Independent [  ]  Supervision [  ]  Needs Assistance [  ]  N/A [ x ]   Ambulation: Independent [  ]  Supervision [  ]  Needs Assistance [  ]  N/A [  x]  ADL: Independent [  ] Requires Assistance [  ] N/A [ x ]  SEE PT/OT IE NOTES    LABS:                        13.7   11.81 )-----------( 257      ( 16 Feb 2022 07:28 )             42.5     02-15    139  |  102  |  20  ----------------------------<  116<H>  4.2   |  26  |  0.6<L>    Ca    9.6      15 Feb 2022 15:42  Mg     1.9     02-15    TPro  6.8  /  Alb  4.2  /  TBili  0.4  /  DBili  x   /  AST  21  /  ALT  19  /  AlkPhos  72  02-15          RADIOLOGY & ADDITIONAL STUDIES:c< from: CT Head No Cont (02.15.22 @ 12:55) >  MAXILLOFACIAL BONES:  Soft tissue swelling involving the left medial periorbital, supraorbital   as well as the premaxillary soft tissues with trace subcutaneous air   noted involving the left nasal/periorbital tissues. No evidence of   fracture.    CT CERVICAL SPINE:  No acute fracture or subluxation.    Bilateral thyroid lobe nodules with a prominent 2 cm calcified right   thyroid lobe nodule which can be further evaluated with nonemergent   thyroid ultrasound.    Nonspecific subcutaneous soft tissue nodule in the left aspect and neck   at the level hyoid. This may represent a lymph node or possibly a     < end of copied text >      Assesment:

## 2022-02-16 NOTE — DISCHARGE NOTE PROVIDER - ATTENDING DISCHARGE PHYSICAL EXAMINATION:
GENERAL: awake, alert, oriented x 3, NAD  Head: facial stitches noted,  no bleeding, no signs of infectoin  CHEST/LUNG: CTAB, symmetrical expansion, no distress  HEART: RRR , no jvd  ABDOMEN: soft non tender  EXTREMITIES:  rt knee effusion alot better with ice nirali and ace wrap, rt wrist/forearm cellulitis is also better with doxy  Neuro: awake, alert, oriented x 3, no focal deficits noted

## 2022-02-16 NOTE — OCCUPATIONAL THERAPY INITIAL EVALUATION ADULT - PHYSICAL ASSIST/NONPHYSICAL ASSIST: SIT/SUPINE, REHAB EVAL
Discharged ambulatory with daughter. Verbal instructions to resume usual diet, medications, and activity.   supervision

## 2022-02-16 NOTE — SPEECH LANGUAGE PATHOLOGY EVALUATION - SLP SUCCESSFUL AUDITORY STRATEGIES
[place]- benefits from repetition and loud clear voice/repetition/visual cues/verbal cues/elimination of environmental distractions

## 2022-02-16 NOTE — PHYSICAL THERAPY INITIAL EVALUATION ADULT - GAIT DEVIATIONS NOTED, PT EVAL
R knee held in approx 40 degrees of flexion due to pain/decreased benjamin/decreased step length/decreased stride length

## 2022-02-16 NOTE — DISCHARGE NOTE PROVIDER - NSDCMRMEDTOKEN_GEN_ALL_CORE_FT
alendronate 10 mg oral tablet: 1 tab(s) orally once a day  losartan 50 mg oral tablet: 1 tab(s) orally once a day   alendronate 10 mg oral tablet: 1 tab(s) orally once a day  doxycycline monohydrate 100 mg oral capsule: 1 cap(s) orally every 12 hours  losartan 50 mg oral tablet: 1 tab(s) orally once a day

## 2022-02-16 NOTE — DISCHARGE NOTE PROVIDER - NSDCPNSUBOBJ_GEN_ALL_CORE
pt seen and examined on 2/16 and 2/17  pt is ready for discharge, awake, alert, no acute distress noted. knee pain and swelling improved see above

## 2022-02-16 NOTE — DISCHARGE NOTE PROVIDER - CARE PROVIDER_API CALL
Dax Frey)  Internal Medicine  [address] [place] [address]  Phone: [phone]  Fax: [phone]  Follow Up Time: 1-3 days

## 2022-02-16 NOTE — CONSULT NOTE ADULT - ASSESSMENT
IMPRESSION: Rehab of 89 y/o  f  rehab  for gd  sp  fall      PRECAUTIONS: [  ] Cardiac  [  ] Respiratory  [  ] Seizures [  ] Contact Isolation  [  ] Droplet Isolation  [ FALL ] Other    Weight Bearing Status:     RECOMMENDATION:    Out of Bed to Chair     DVT/Decubiti Prophylaxis    REHAB PLAN:     [ x  ] Bedside P/T 3-5 times a week   [ x  ]   Bedside O/T  2-3 times a week             [   ] No Rehab Therapy Indicated                   [   ]  Speech Therapy   Conditioning/ROM                                    ADL  Bed Mobility                                               Conditioning/ROM  Transfers                                                     Bed Mobility  Sitting /Standing Balance                         Transfers                                        Gait Training                                               Sitting/Standing Balance  Stair Training [   ]Applicable                    Home equipment Eval                                                                        Splinting  [   ] Only      GOALS:   ADL   [x   ]   Independent                    Transfers  [x   ] Independent                          Ambulation  [ x  ] Independent     [ x   ] With device                            [  x ]  CG                                                         [  x ]  CG                                                                  [  x ] CG                            [    ] Min A                                                   [   ] Min A                                                              [   ] Min  A          DISCHARGE PLAN:   [   ]  Good candidate for Intensive Rehabilitation/Hospital based-[facility]A [facility]                                             Will tolerate 3hrs Intensive Rehab Daily                                       [  x  ]  Short Term Rehab in Skilled Nursing Facility  and  cont  current care                                      [ x ]  Home with Outpatient or VN services                                         [    ]  Possible Candidate for Intensive Hospital based Rehab

## 2022-02-17 PROCEDURE — 99239 HOSP IP/OBS DSCHRG MGMT >30: CPT

## 2022-02-17 RX ADMIN — Medication 100 MILLIGRAM(S): at 05:57

## 2022-02-17 RX ADMIN — LIDOCAINE 1 APPLICATION(S): 4 CREAM TOPICAL at 13:59

## 2022-02-17 RX ADMIN — Medication 100 MILLIGRAM(S): at 17:17

## 2022-02-17 RX ADMIN — CHLORHEXIDINE GLUCONATE 1 APPLICATION(S): 213 SOLUTION TOPICAL at 05:56

## 2022-02-17 RX ADMIN — LIDOCAINE 1 APPLICATION(S): 4 CREAM TOPICAL at 05:57

## 2022-02-17 RX ADMIN — LIDOCAINE 1 APPLICATION(S): 4 CREAM TOPICAL at 21:27

## 2022-02-17 RX ADMIN — LOSARTAN POTASSIUM 50 MILLIGRAM(S): 100 TABLET, FILM COATED ORAL at 05:58

## 2022-02-18 LAB — SARS-COV-2 RNA SPEC QL NAA+PROBE: SIGNIFICANT CHANGE UP

## 2022-02-18 PROCEDURE — 99232 SBSQ HOSP IP/OBS MODERATE 35: CPT

## 2022-02-18 RX ADMIN — Medication 100 MILLIGRAM(S): at 18:05

## 2022-02-18 RX ADMIN — LIDOCAINE 1 APPLICATION(S): 4 CREAM TOPICAL at 21:27

## 2022-02-18 RX ADMIN — LIDOCAINE 1 APPLICATION(S): 4 CREAM TOPICAL at 05:27

## 2022-02-18 RX ADMIN — LIDOCAINE 1 APPLICATION(S): 4 CREAM TOPICAL at 13:08

## 2022-02-18 RX ADMIN — LOSARTAN POTASSIUM 50 MILLIGRAM(S): 100 TABLET, FILM COATED ORAL at 05:23

## 2022-02-18 RX ADMIN — Medication 100 MILLIGRAM(S): at 05:24

## 2022-02-19 PROCEDURE — 99232 SBSQ HOSP IP/OBS MODERATE 35: CPT

## 2022-02-19 RX ADMIN — LIDOCAINE 1 APPLICATION(S): 4 CREAM TOPICAL at 09:47

## 2022-02-19 RX ADMIN — LIDOCAINE 1 APPLICATION(S): 4 CREAM TOPICAL at 13:34

## 2022-02-19 RX ADMIN — LIDOCAINE 1 APPLICATION(S): 4 CREAM TOPICAL at 21:08

## 2022-02-19 RX ADMIN — Medication 100 MILLIGRAM(S): at 17:23

## 2022-02-19 RX ADMIN — LOSARTAN POTASSIUM 50 MILLIGRAM(S): 100 TABLET, FILM COATED ORAL at 06:09

## 2022-02-19 RX ADMIN — Medication 100 MILLIGRAM(S): at 06:10

## 2022-02-20 ENCOUNTER — TRANSCRIPTION ENCOUNTER (OUTPATIENT)
Age: 87
End: 2022-02-20

## 2022-02-20 VITALS
HEART RATE: 105 BPM | TEMPERATURE: 99 F | DIASTOLIC BLOOD PRESSURE: 66 MMHG | SYSTOLIC BLOOD PRESSURE: 111 MMHG | RESPIRATION RATE: 16 BRPM

## 2022-02-20 LAB — SARS-COV-2 RNA SPEC QL NAA+PROBE: SIGNIFICANT CHANGE UP

## 2022-02-20 PROCEDURE — 99232 SBSQ HOSP IP/OBS MODERATE 35: CPT

## 2022-02-20 RX ADMIN — LOSARTAN POTASSIUM 50 MILLIGRAM(S): 100 TABLET, FILM COATED ORAL at 05:20

## 2022-02-20 RX ADMIN — Medication 100 MILLIGRAM(S): at 05:21

## 2022-02-20 RX ADMIN — LIDOCAINE 1 APPLICATION(S): 4 CREAM TOPICAL at 05:23

## 2022-02-20 RX ADMIN — LIDOCAINE 1 APPLICATION(S): 4 CREAM TOPICAL at 14:07

## 2022-02-20 NOTE — DISCHARGE NOTE NURSING/CASE MANAGEMENT/SOCIAL WORK - NSDCVIVACCINE_GEN_ALL_CORE_FT
Tdap; 15-Feb-2022 11:47; Sanseverino, Jennifer (RN); [employer]; [id number] (Exp. Date: 28-Sep-2023); IntraMuscular; Deltoid Right.; 0.5 milliLiter(s); VIS (VIS Published: 09-May-2013, VIS Presented: 15-Feb-2022);

## 2022-02-20 NOTE — DISCHARGE NOTE NURSING/CASE MANAGEMENT/SOCIAL WORK - PATIENT PORTAL LINK FT
You can access the FollowMyHealth Patient Portal offered by [facility] by registering at the following website: [email or web address]. By joining [employer]’s FollowMyHealth portal, you will also be able to view your health information using other applications (apps) compatible with our system.

## 2022-02-20 NOTE — PROGRESS NOTE ADULT - ASSESSMENT
89 yo F PMH HTN, Bronchiectasis, Thoracic Aortic Aneurysm admitted for inability to ambulate    #s/p mechanical fall sustaining facial laceration status post sutures, rt knee bruise/contusion  -Trauma w/u negative for acute fractures  -Pt/Ot/ST  -Patient lives alone (son lives in [place], daughter in [place]). She is not interested in placement, but would like HHA   -ice nirali to rt knee contusion    #Leukocytosis   -WBC 16.99 >11.8  -mild cellulitis noted distral rt forearm near wrist.   -add doxy po x 5 days     #HTN  -Continue Losartan     #Incidental Right frontal meningioma  -Outpatient f/u    #Bronchiectasis   -no resp distress  -Outpatient f/u     #Thoracic Aortic Aneurysm  -bp stable, no chest pain  -Patient reports no additional follow ups needed because it's unchanged   
89 yo F PMH HTN, Bronchiectasis, Thoracic Aortic Aneurysm admitted for inability to ambulate    #s/p mechanical fall sustaining facial laceration status post sutures, rt knee bruise/contusion  -Trauma w/u negative for acute fractures  -Patient lives alone (son lives in [place], daughter in [place]). She is not interested in placement, but would like HHA   -ice nirali to rt knee contusion  -ambulated with pt with the use of walker    #Leukocytosis   -WBC 16.99 >11.8  -mild cellulitis noted distral rt forearm near wrist.   -added doxy po x 5 days and improving.     #HTN  -Continue Losartan     #Incidental Right frontal meningioma  -recommended Outpatient f/u    #Bronchiectasis   -no resp distress  -Outpatient f/u     #Thoracic Aortic Aneurysm  -bp stable, no chest pain  -Patient reports no additional follow ups needed because it's unchanged     Disp  stable for dc today 
89 yo F PMH HTN, Bronchiectasis, Thoracic Aortic Aneurysm admitted for inability to ambulate    #s/p mechanical fall sustaining facial laceration status post sutures, rt knee bruise/contusion  -Trauma w/u negative for acute fractures  -Patient lives alone (son lives in [place], daughter in [place]).    -ice nirali to rt knee contusion    #Leukocytosis 2/2 cellulitis Rt distal foream near wrist  -nearly resolved  -on doxy , last day 2/20    #HTN  -Continue Losartan     #Incidental Right frontal meningioma  -recommended Outpatient f/u    #Bronchiectasis   -no resp distress  -Outpatient f/u     #Thoracic Aortic Aneurysm  -bp stable, no chest pain  -Patient reports no additional follow ups needed because it's unchanged     Disp  stable for dc today, has a rehab bed available today   discussed with her son jason [phone] 2/19
89 yo F PMH HTN, Bronchiectasis, Thoracic Aortic Aneurysm admitted for inability to ambulate    #s/p mechanical fall sustaining facial laceration status post sutures, rt knee bruise/contusion  -Trauma w/u negative for acute fractures  -Patient lives alone (son lives in [place], daughter in [place]). She is not interested in placement, but would like HHA   -ice nirali to rt knee contusion  -ambulated with pt with the use of walker    #Leukocytosis   -WBC 16.99 >11.8  -mild cellulitis noted distral rt forearm near wrist.  improving significantly   -continue doxy po x 3 more days ( last day 2/20)    #HTN  -Continue Losartan     #Incidental Right frontal meningioma  -recommended Outpatient f/u    #Bronchiectasis   -no resp distress  -Outpatient f/u     #Thoracic Aortic Aneurysm  -bp stable, no chest pain  -Patient reports no additional follow ups needed because it's unchanged     Disp  stable for dc today 
89 yo F PMH HTN, Bronchiectasis, Thoracic Aortic Aneurysm admitted for inability to ambulate    #s/p mechanical fall sustaining facial laceration status post sutures, rt knee bruise/contusion  -Trauma w/u negative for acute fractures  -Patient lives alone (son lives in [place], daughter in [place]).    -ice nirali to rt knee contusion    #Leukocytosis 2/2 cellulitis Rt distal foream near wrist  -nearly resolved  -on doxy , last day 2/20    #HTN  -Continue Losartan     #Incidental Right frontal meningioma  -recommended Outpatient f/u    #Bronchiectasis   -no resp distress  -Outpatient f/u     #Thoracic Aortic Aneurysm  -bp stable, no chest pain  -Patient reports no additional follow ups needed because it's unchanged     Disp  stable for dc   awaiting bed at rehab   discussed with her son jason [phone]

## 2022-02-20 NOTE — PROGRESS NOTE ADULT - SUBJECTIVE AND OBJECTIVE BOX
CHAZ BUCHANAN  89y, Female  Allergy: penicillin (Eye Irritation)  sulfa drugs (Eye Irritation)    Hospital Day: 5d    Patient seen and examined earlier today. no complaints, she is feeling ok, no distress    PMH/PSH:  PAST MEDICAL & SURGICAL HISTORY:  Bronchiectasis    Aneurysm of aorta  Throacic    HTN (hypertension)    No significant past surgical history  Excisions of skin cancer on left arm        LAST 24-Hr EVENTS:    VITALS:  T(F): 98.1 (02-20-22 @ 05:32), Max: 98.5 (02-19-22 @ 13:09)  HR: 83 (02-20-22 @ 05:32)  BP: 123/72 (02-20-22 @ 05:32) (108/70 - 123/72)  RR: 18 (02-20-22 @ 05:32)  SpO2: --          TESTS & MEASUREMENTS:  Weight/BMI  49.7 (02-15-22 @ 20:40)  19.4 (02-15-22 @ 20:40)                                  COVID-19 PCR: NotDetec (02-19-22 @ 15:45)  COVID-19 PCR: NotDetec (02-17-22 @ 19:25)  COVID-19 PCR: NotDetec (02-15-22 @ 15:42)                RADIOLOGY, ECG, & ADDITIONAL TESTS:    CT Head No Cont:   ACC: [id number] EXAM:  CT CERVICAL SPINE                        ACC: [id number] EXAM:  CT MAXILLOFACIAL                        ACC: [id number] EXAM:  CT BRAIN                          PROCEDURE DATE:  02/15/2022          INTERPRETATION:  CLINICAL INDICATIONS:  Closed head injury. Status post   fall.    TECHNIQUE:  Noncontrast head, maxillofacial and cervical spine CT was   performed.    Multiple contiguous axial images were obtained from the skull base to the   vertex without the use of intravenous contrast. Reformatted coronal and   sagittal images were were subsequently obtained and reviewed.    Axial 1.3 mm sections from the frontal sinuses through the maxilla.  The   study was supplemented with coronal and sagittal reformatted images.    Intravenous contrast was not administered    Axial images were obtained through the cervical spine using multislice   helical technique.  Reformatted coronal and sagittal images were were   subsequently obtained and reviewed.    COMPARISON: None.    FINDINGS:    CT BRAIN:    There is no evidence for acute intracranial hemorrhage, mass effect or   midline shift.    There is extra-axial ossification overlying the right frontal   parasagittal region possibly representing a calcified meningioma.    There is periventricular white matter hypodensity. There is a focal   hypodensity within the right thalamus.    The basal cisterns are patent. There is no hydrocephalus.    There is no extra-axial collection.    The visualized orbits are unremarkable.    The calvarium is intact, without depressed fracture.    The visualized paranasal sinuses and mastoid air cells are clear.    MAXILLOFACIAL BONES:    Soft tissue swelling involving the left medial periorbital soft tissues   as well as the premaxillary soft tissues with trace subcutaneous air   noted involving the left nasal/periorbital tissues. No evidence of   fracture  .  The paranasal sinuses are well aerated. There is a small mucous retention   cyst or polyp within the left maxillary sinus.    The orbits are unremarkable. The optic nerves and extraocular muscles are   unremarkable. There is no evidence of retrobulbar hematoma or intraocular   hemorrhage.    There is periapical lucency involving left mandibular canine tooth with   buccal cortical dehiscence as well as associated ossific changes of the   mandibular marrow likely representing osteitis.    CT CERVICAL SPINE:    There is no prevertebral soft tissue swelling. There is no splaying of   the spinous processes. The occipital condyles are normal. Lateral masses   of C1 align normally with C2. No lucent fracture line is identified.   There is no spondylolisthesis.    Multilevel degenerative osteoarthritis and degenerative disc disease are   present. Findings include marginal osteophytes, uncovertebral spurring,   loss of normal disc space height, endplate sclerosis, and facet joint   arthrosis.    There is no high-grade spinal canal stenosis, although the spinal canal   contents are suboptimally evaluated inherent to CT technique.    The visualized lung apices are within normal limits.    Miscellaneous:  There is a large calcified nodule involving the right   thyroid. There is a hypodense 1.2 cm left thyroid lobe nodule.    There is a nonspecific subcutaneous nodule involving the left neck   anterior to the lower platysma muscle, series 2 image 53..    IMPRESSION:    CT HEAD:  No acute intracranial pathology or hemorrhage. No acute calvarial   fracture.    Mild chronic microvascular type changes as well as an age-indeterminate   lacunar infarct involving the right thalamus.    Partially calcified 1 cm mass overlying the anterior right frontal lobe   possibly representing a meningioma.    MAXILLOFACIAL BONES:  Soft tissue swelling involving the left medial periorbital, supraorbital   as well as the premaxillary soft tissues with trace subcutaneous air   noted involving the left nasal/periorbital tissues. No evidence of   fracture.    CT CERVICAL SPINE:  No acute fracture or subluxation.    Bilateral thyroid lobe nodules with a prominent 2 cm calcified right   thyroid lobe nodule which can be further evaluated with nonemergent   thyroid ultrasound.    Nonspecific subcutaneous soft tissue nodule in the left aspect and neck   at the level hyoid. This may represent a lymph node or possibly a   sebaceous cyst for which visible examination is advised.    --- End of Report ---            ZAMZAM LEGER MD; Attending Radiologist  This document has been electronically signed. Feb 15 2022  1:21PM (02-15-22 @ 12:55)    RECENT DIAGNOSTIC ORDERS:      MEDICATIONS:  MEDICATIONS  (STANDING):  chlorhexidine 2% Cloths 1 Application(s) Topical <User Schedule>  doxycycline hyclate Capsule 100 milliGRAM(s) Oral every 12 hours  lidocaine 2% Gel 1 Application(s) Topical three times a day  losartan 50 milliGRAM(s) Oral daily    MEDICATIONS  (PRN):  acetaminophen     Tablet .. 650 milliGRAM(s) Oral every 6 hours PRN Temp greater or equal to 38C (100.4F), Mild Pain (1 - 3)  aluminum hydroxide/magnesium hydroxide/simethicone Suspension 30 milliLiter(s) Oral every 4 hours PRN Dyspepsia  melatonin 3 milliGRAM(s) Oral at bedtime PRN Insomnia  ondansetron Injectable 4 milliGRAM(s) IV Push every 8 hours PRN Nausea and/or Vomiting      HOME MEDICATIONS:  alendronate 10 mg oral tablet (02-15)  doxycycline monohydrate 100 mg oral capsule (02-17)  losartan 50 mg oral tablet (02-15)      PHYSICAL EXAM:  GENERAL: awake, alert, oriented x 3, NAD  Head: facial stitches noted,  no bleeding, no signs of infectoin  CHEST/LUNG: CTAB, symmetrical expansion, no distress  HEART: RRR , no jvd  ABDOMEN: soft non tender  EXTREMITIES:  rt knee effusion alot better with ice nirali and ace wrap, rt wrist/forearm cellulitis is nearly resolved   Neuro: awake, alert, oriented x 3, no focal deficits noted      
T H I S   I S    N O  T   A    F I N A L I Z E D   N O T CHAZ FERGUSON  89y, Female  Allergy: penicillin (Eye Irritation)  sulfa drugs (Eye Irritation)    Hospital Day: 3d    Patient seen and examined earlier today.   no new issues, pt is doing well    PMH/PSH:  PAST MEDICAL & SURGICAL HISTORY:  Bronchiectasis    Aneurysm of aorta  Throacic    HTN (hypertension)    No significant past surgical history  Excisions of skin cancer on left arm        LAST 24-Hr EVENTS:    VITALS:  T(F): 97.6 (02-18-22 @ 05:00), Max: 98.1 (02-17-22 @ 21:30)  HR: 92 (02-18-22 @ 05:00)  BP: 109/61 (02-18-22 @ 05:00) (109/61 - 144/65)  RR: 18 (02-18-22 @ 05:00)  SpO2: --          TESTS & MEASUREMENTS:  Weight/BMI  49.7 (02-15-22 @ 20:40)  19.4 (02-15-22 @ 20:40)    COVID-19 PCR: NotDetec (02-17-22 @ 19:25)  COVID-19 PCR: NotDetec (02-15-22 @ 15:42)    RADIOLOGY, ECG, & ADDITIONAL TESTS:    CT Head No Cont:   ACC: [id number] EXAM:  CT CERVICAL SPINE                        ACC: [id number] EXAM:  CT MAXILLOFACIAL                        ACC: [id number] EXAM:  CT BRAIN                          PROCEDURE DATE:  02/15/2022          INTERPRETATION:  CLINICAL INDICATIONS:  Closed head injury. Status post   fall.    TECHNIQUE:  Noncontrast head, maxillofacial and cervical spine CT was   performed.    Multiple contiguous axial images were obtained from the skull base to the   vertex without the use of intravenous contrast. Reformatted coronal and   sagittal images were were subsequently obtained and reviewed.    Axial 1.3 mm sections from the frontal sinuses through the maxilla.  The   study was supplemented with coronal and sagittal reformatted images.    Intravenous contrast was not administered    Axial images were obtained through the cervical spine using multislice   helical technique.  Reformatted coronal and sagittal images were were   subsequently obtained and reviewed.    COMPARISON: None.    FINDINGS:    CT BRAIN:    There is no evidence for acute intracranial hemorrhage, mass effect or   midline shift.    There is extra-axial ossification overlying the right frontal   parasagittal region possibly representing a calcified meningioma.    There is periventricular white matter hypodensity. There is a focal   hypodensity within the right thalamus.    The basal cisterns are patent. There is no hydrocephalus.    There is no extra-axial collection.    The visualized orbits are unremarkable.    The calvarium is intact, without depressed fracture.    The visualized paranasal sinuses and mastoid air cells are clear.    MAXILLOFACIAL BONES:    Soft tissue swelling involving the left medial periorbital soft tissues   as well as the premaxillary soft tissues with trace subcutaneous air   noted involving the left nasal/periorbital tissues. No evidence of   fracture  .  The paranasal sinuses are well aerated. There is a small mucous retention   cyst or polyp within the left maxillary sinus.    The orbits are unremarkable. The optic nerves and extraocular muscles are   unremarkable. There is no evidence of retrobulbar hematoma or intraocular   hemorrhage.    There is periapical lucency involving left mandibular canine tooth with   buccal cortical dehiscence as well as associated ossific changes of the   mandibular marrow likely representing osteitis.    CT CERVICAL SPINE:    There is no prevertebral soft tissue swelling. There is no splaying of   the spinous processes. The occipital condyles are normal. Lateral masses   of C1 align normally with C2. No lucent fracture line is identified.   There is no spondylolisthesis.    Multilevel degenerative osteoarthritis and degenerative disc disease are   present. Findings include marginal osteophytes, uncovertebral spurring,   loss of normal disc space height, endplate sclerosis, and facet joint   arthrosis.    There is no high-grade spinal canal stenosis, although the spinal canal   contents are suboptimally evaluated inherent to CT technique.    The visualized lung apices are within normal limits.    Miscellaneous:  There is a large calcified nodule involving the right   thyroid. There is a hypodense 1.2 cm left thyroid lobe nodule.    There is a nonspecific subcutaneous nodule involving the left neck   anterior to the lower platysma muscle, series 2 image 53..    IMPRESSION:    CT HEAD:  No acute intracranial pathology or hemorrhage. No acute calvarial   fracture.    Mild chronic microvascular type changes as well as an age-indeterminate   lacunar infarct involving the right thalamus.    Partially calcified 1 cm mass overlying the anterior right frontal lobe   possibly representing a meningioma.    MAXILLOFACIAL BONES:  Soft tissue swelling involving the left medial periorbital, supraorbital   as well as the premaxillary soft tissues with trace subcutaneous air   noted involving the left nasal/periorbital tissues. No evidence of   fracture.    CT CERVICAL SPINE:  No acute fracture or subluxation.    Bilateral thyroid lobe nodules with a prominent 2 cm calcified right   thyroid lobe nodule which can be further evaluated with nonemergent   thyroid ultrasound.    Nonspecific subcutaneous soft tissue nodule in the left aspect and neck   at the level hyoid. This may represent a lymph node or possibly a   sebaceous cyst for which visible examination is advised.    --- End of Report ---        ZAMZAM LEGER MD; Attending Radiologist  This document has been electronically signed. Feb 15 2022  1:21PM (02-15-22 @ 12:55)    RECENT DIAGNOSTIC ORDERS:      MEDICATIONS:  MEDICATIONS  (STANDING):  chlorhexidine 2% Cloths 1 Application(s) Topical <User Schedule>  doxycycline hyclate Capsule 100 milliGRAM(s) Oral every 12 hours  lidocaine 2% Gel 1 Application(s) Topical three times a day  losartan 50 milliGRAM(s) Oral daily    MEDICATIONS  (PRN):  acetaminophen     Tablet .. 650 milliGRAM(s) Oral every 6 hours PRN Temp greater or equal to 38C (100.4F), Mild Pain (1 - 3)  aluminum hydroxide/magnesium hydroxide/simethicone Suspension 30 milliLiter(s) Oral every 4 hours PRN Dyspepsia  melatonin 3 milliGRAM(s) Oral at bedtime PRN Insomnia  ondansetron Injectable 4 milliGRAM(s) IV Push every 8 hours PRN Nausea and/or Vomiting      HOME MEDICATIONS:  alendronate 10 mg oral tablet (02-15)  doxycycline monohydrate 100 mg oral capsule (02-17)  losartan 50 mg oral tablet (02-15)      PHYSICAL EXAM:  ENERAL: awake, alert, oriented x 3, NAD  Head: facial stitches noted,  no bleeding, no signs of infectoin  CHEST/LUNG: CTAB, symmetrical expansion, no distress  HEART: RRR , no jvd  ABDOMEN: soft non tender  EXTREMITIES:  rt knee effusion alot better with ice nirali and ace wrap, rt wrist/forearm cellulitis is also better with doxy  Neuro: awake, alert, oriented x 3, no focal deficits noted        
   CHAZ BUCHANAN  89y, Female  Allergy: penicillin (Eye Irritation)  sulfa drugs (Eye Irritation)    Hospital Day: 1d    Patient seen and examined earlier today.   pt is awake, pleasant, hard of hearing, her friend present at bedside. pt reported having no new c/o  her friend had a concern about an area of redness and mild swelling of the extensor surface of rt wrist. also reported some swelling of rt knee after the fall. pt is able to bend her knee and non tender on exam.     PMH/PSH:  PAST MEDICAL & SURGICAL HISTORY:  Bronchiectasis    Aneurysm of aorta  Throacic    HTN (hypertension)    No significant past surgical history  Excisions of skin cancer on left arm    LAST 24-Hr EVENTS:    VITALS:  T(F): 96.3 (02-16-22 @ 04:59), Max: 98.2 (02-15-22 @ 20:40)  HR: 83 (02-16-22 @ 04:59)  BP: 133/74 (02-16-22 @ 04:59) (133/74 - 159/86)  RR: 17 (02-16-22 @ 04:59)  SpO2: 96% (02-15-22 @ 17:50)    TESTS & MEASUREMENTS:  Weight/BMI  49.7 (02-15-22 @ 20:40)  19.4 (02-15-22 @ 20:40)                          13.7   11.81 )-----------( 257      ( 16 Feb 2022 07:28 )             42.5         02-15    139  |  102  |  20  ----------------------------<  116<H>  4.2   |  26  |  0.6<L>    Ca    9.6      15 Feb 2022 15:42  Mg     1.9     02-15    TPro  6.8  /  Alb  4.2  /  TBili  0.4  /  DBili  x   /  AST  21  /  ALT  19  /  AlkPhos  72  02-15    LIVER FUNCTIONS - ( 15 Feb 2022 15:42 )  Alb: 4.2 g/dL / Pro: 6.8 g/dL / ALK PHOS: 72 U/L / ALT: 19 U/L / AST: 21 U/L / GGT: x           COVID-19 PCR: NotDetec (02-15-22 @ 15:42)      RADIOLOGY, ECG, & ADDITIONAL TESTS:    CT Head No Cont:   ACC: [id number] EXAM:  CT CERVICAL SPINE                        ACC: [id number] EXAM:  CT MAXILLOFACIAL                        ACC: [id number] EXAM:  CT BRAIN                          PROCEDURE DATE:  02/15/2022      INTERPRETATION:  CLINICAL INDICATIONS:  Closed head injury. Status post   fall.    TECHNIQUE:  Noncontrast head, maxillofacial and cervical spine CT was   performed.    Multiple contiguous axial images were obtained from the skull base to the   vertex without the use of intravenous contrast. Reformatted coronal and   sagittal images were were subsequently obtained and reviewed.    Axial 1.3 mm sections from the frontal sinuses through the maxilla.  The   study was supplemented with coronal and sagittal reformatted images.    Intravenous contrast was not administered    Axial images were obtained through the cervical spine using multislice   helical technique.  Reformatted coronal and sagittal images were were   subsequently obtained and reviewed.    COMPARISON: None.    FINDINGS:    CT BRAIN:    There is no evidence for acute intracranial hemorrhage, mass effect or   midline shift.    There is extra-axial ossification overlying the right frontal   parasagittal region possibly representing a calcified meningioma.    There is periventricular white matter hypodensity. There is a focal   hypodensity within the right thalamus.    The basal cisterns are patent. There is no hydrocephalus.    There is no extra-axial collection.    The visualized orbits are unremarkable.    The calvarium is intact, without depressed fracture.    The visualized paranasal sinuses and mastoid air cells are clear.    MAXILLOFACIAL BONES:    Soft tissue swelling involving the left medial periorbital soft tissues   as well as the premaxillary soft tissues with trace subcutaneous air   noted involving the left nasal/periorbital tissues. No evidence of   fracture  .  The paranasal sinuses are well aerated. There is a small mucous retention   cyst or polyp within the left maxillary sinus.    The orbits are unremarkable. The optic nerves and extraocular muscles are   unremarkable. There is no evidence of retrobulbar hematoma or intraocular   hemorrhage.    There is periapical lucency involving left mandibular canine tooth with   buccal cortical dehiscence as well as associated ossific changes of the   mandibular marrow likely representing osteitis.    CT CERVICAL SPINE:    There is no prevertebral soft tissue swelling. There is no splaying of   the spinous processes. The occipital condyles are normal. Lateral masses   of C1 align normally with C2. No lucent fracture line is identified.   There is no spondylolisthesis.    Multilevel degenerative osteoarthritis and degenerative disc disease are   present. Findings include marginal osteophytes, uncovertebral spurring,   loss of normal disc space height, endplate sclerosis, and facet joint   arthrosis.    There is no high-grade spinal canal stenosis, although the spinal canal   contents are suboptimally evaluated inherent to CT technique.    The visualized lung apices are within normal limits.    Miscellaneous:  There is a large calcified nodule involving the right   thyroid. There is a hypodense 1.2 cm left thyroid lobe nodule.    There is a nonspecific subcutaneous nodule involving the left neck   anterior to the lower platysma muscle, series 2 image 53..    IMPRESSION:    CT HEAD:  No acute intracranial pathology or hemorrhage. No acute calvarial   fracture.    Mild chronic microvascular type changes as well as an age-indeterminate   lacunar infarct involving the right thalamus.    Partially calcified 1 cm mass overlying the anterior right frontal lobe   possibly representing a meningioma.    MAXILLOFACIAL BONES:  Soft tissue swelling involving the left medial periorbital, supraorbital   as well as the premaxillary soft tissues with trace subcutaneous air   noted involving the left nasal/periorbital tissues. No evidence of   fracture.    CT CERVICAL SPINE:  No acute fracture or subluxation.    Bilateral thyroid lobe nodules with a prominent 2 cm calcified right   thyroid lobe nodule which can be further evaluated with nonemergent   thyroid ultrasound.    Nonspecific subcutaneous soft tissue nodule in the left aspect and neck   at the level hyoid. This may represent a lymph node or possibly a   sebaceous cyst for which visible examination is advised.    --- End of Report ---    ZAMZAM LEGER MD; Attending Radiologist  This document has been electronically signed. Feb 15 2022  1:21PM (02-15-22 @ 12:55)    RECENT DIAGNOSTIC ORDERS:  Diet, Regular (02-15-22 @ 17:53)  Urinalysis: STAT (02-15-22 @ 14:47)      MEDICATIONS:  MEDICATIONS  (STANDING):  chlorhexidine 2% Cloths 1 Application(s) Topical <User Schedule>  doxycycline hyclate Capsule 100 milliGRAM(s) Oral every 12 hours  lidocaine 2% Gel 1 Application(s) Topical three times a day  losartan 50 milliGRAM(s) Oral daily    MEDICATIONS  (PRN):  acetaminophen     Tablet .. 650 milliGRAM(s) Oral every 6 hours PRN Temp greater or equal to 38C (100.4F), Mild Pain (1 - 3)  aluminum hydroxide/magnesium hydroxide/simethicone Suspension 30 milliLiter(s) Oral every 4 hours PRN Dyspepsia  melatonin 3 milliGRAM(s) Oral at bedtime PRN Insomnia  ondansetron Injectable 4 milliGRAM(s) IV Push every 8 hours PRN Nausea and/or Vomiting      HOME MEDICATIONS:  alendronate 10 mg oral tablet (02-15)  losartan 50 mg oral tablet (02-15)      PHYSICAL EXAM:    Constitutional: NAD, A&O x3, hard of hearing. Non-diaphoretic, non-toxic appearing, speaking in full sentences   HEENT:  sutures on face. EOMI. Sclera white.   Respiratory: CTAB, +air entry, no rales, no rhonchi, no wheezes, symmetrical expansion  Cardiovascular: +S1 and S2, regular rate and rhythm.    Gastrointestinal: soft, non-tender, not distended  Extremities:  no edema, no calf tenderness.   MSK: mild knee effusion, non tender, full range of motion  Vascular: +dorsal pedis and radial pulses, no extremity cyanosis  Neurological: no motor deficits, face symmetrical.   Skin: mild rendess, area on distral rt foream near rt wrist with rendss and mild swelling. suspicous for cellulitis       
CHAZ BUCHANAN  89y, Female  Allergy: penicillin (Eye Irritation)  sulfa drugs (Eye Irritation)    Hospital Day: 4d    Patient seen and examined earlier today.   pt is awake and alert, no acute distress, feeling well, has no complaints    PMH/PSH:  PAST MEDICAL & SURGICAL HISTORY:  Bronchiectasis    Aneurysm of aorta  Throacic    HTN (hypertension)    No significant past surgical history  Excisions of skin cancer on left arm    LAST 24-Hr EVENTS:    VITALS:  T(F): 98.6 (02-19-22 @ 05:00), Max: 98.6 (02-19-22 @ 05:00)  HR: 80 (02-19-22 @ 05:00)  BP: 134/67 (02-19-22 @ 05:00) (99/52 - 134/67)  RR: 16 (02-19-22 @ 05:00)  SpO2: 92% (02-18-22 @ 20:25)    TESTS & MEASUREMENTS:  Weight/BMI  49.7 (02-15-22 @ 20:40)  19.4 (02-15-22 @ 20:40)    COVID-19 PCR: NotDetec (02-17-22 @ 19:25)  COVID-19 PCR: NotDetec (02-15-22 @ 15:42)    RADIOLOGY, ECG, & ADDITIONAL TESTS:    CT Head No Cont:   ACC: [id number] EXAM:  CT CERVICAL SPINE                        ACC: [id number] EXAM:  CT MAXILLOFACIAL                        ACC: [id number] EXAM:  CT BRAIN                          PROCEDURE DATE:  02/15/2022      INTERPRETATION:  CLINICAL INDICATIONS:  Closed head injury. Status post   fall.    TECHNIQUE:  Noncontrast head, maxillofacial and cervical spine CT was   performed.    Multiple contiguous axial images were obtained from the skull base to the   vertex without the use of intravenous contrast. Reformatted coronal and   sagittal images were were subsequently obtained and reviewed.    Axial 1.3 mm sections from the frontal sinuses through the maxilla.  The   study was supplemented with coronal and sagittal reformatted images.    Intravenous contrast was not administered    Axial images were obtained through the cervical spine using multislice   helical technique.  Reformatted coronal and sagittal images were were   subsequently obtained and reviewed.    COMPARISON: None.    FINDINGS:    CT BRAIN:    There is no evidence for acute intracranial hemorrhage, mass effect or   midline shift.    There is extra-axial ossification overlying the right frontal   parasagittal region possibly representing a calcified meningioma.    There is periventricular white matter hypodensity. There is a focal   hypodensity within the right thalamus.    The basal cisterns are patent. There is no hydrocephalus.    There is no extra-axial collection.    The visualized orbits are unremarkable.    The calvarium is intact, without depressed fracture.    The visualized paranasal sinuses and mastoid air cells are clear.    MAXILLOFACIAL BONES:    Soft tissue swelling involving the left medial periorbital soft tissues   as well as the premaxillary soft tissues with trace subcutaneous air   noted involving the left nasal/periorbital tissues. No evidence of   fracture  .  The paranasal sinuses are well aerated. There is a small mucous retention   cyst or polyp within the left maxillary sinus.    The orbits are unremarkable. The optic nerves and extraocular muscles are   unremarkable. There is no evidence of retrobulbar hematoma or intraocular   hemorrhage.    There is periapical lucency involving left mandibular canine tooth with   buccal cortical dehiscence as well as associated ossific changes of the   mandibular marrow likely representing osteitis.    CT CERVICAL SPINE:    There is no prevertebral soft tissue swelling. There is no splaying of   the spinous processes. The occipital condyles are normal. Lateral masses   of C1 align normally with C2. No lucent fracture line is identified.   There is no spondylolisthesis.    Multilevel degenerative osteoarthritis and degenerative disc disease are   present. Findings include marginal osteophytes, uncovertebral spurring,   loss of normal disc space height, endplate sclerosis, and facet joint   arthrosis.    There is no high-grade spinal canal stenosis, although the spinal canal   contents are suboptimally evaluated inherent to CT technique.    The visualized lung apices are within normal limits.    Miscellaneous:  There is a large calcified nodule involving the right   thyroid. There is a hypodense 1.2 cm left thyroid lobe nodule.    There is a nonspecific subcutaneous nodule involving the left neck   anterior to the lower platysma muscle, series 2 image 53..    IMPRESSION:    CT HEAD:  No acute intracranial pathology or hemorrhage. No acute calvarial   fracture.    Mild chronic microvascular type changes as well as an age-indeterminate   lacunar infarct involving the right thalamus.    Partially calcified 1 cm mass overlying the anterior right frontal lobe   possibly representing a meningioma.    MAXILLOFACIAL BONES:  Soft tissue swelling involving the left medial periorbital, supraorbital   as well as the premaxillary soft tissues with trace subcutaneous air   noted involving the left nasal/periorbital tissues. No evidence of   fracture.    CT CERVICAL SPINE:  No acute fracture or subluxation.    Bilateral thyroid lobe nodules with a prominent 2 cm calcified right   thyroid lobe nodule which can be further evaluated with nonemergent   thyroid ultrasound.    Nonspecific subcutaneous soft tissue nodule in the left aspect and neck   at the level hyoid. This may represent a lymph node or possibly a   sebaceous cyst for which visible examination is advised.    --- End of Report ---    ZAMZAM LEGER MD; Attending Radiologist  This document has been electronically signed. Feb 15 2022  1:21PM (02-15-22 @ 12:55)    RECENT DIAGNOSTIC ORDERS:  COVID-19 PCR: Routine  Specimen Source: Nasopharyngeal (02-19-22 @ 12:17)      MEDICATIONS:  MEDICATIONS  (STANDING):  chlorhexidine 2% Cloths 1 Application(s) Topical <User Schedule>  doxycycline hyclate Capsule 100 milliGRAM(s) Oral every 12 hours  lidocaine 2% Gel 1 Application(s) Topical three times a day  losartan 50 milliGRAM(s) Oral daily    MEDICATIONS  (PRN):  acetaminophen     Tablet .. 650 milliGRAM(s) Oral every 6 hours PRN Temp greater or equal to 38C (100.4F), Mild Pain (1 - 3)  aluminum hydroxide/magnesium hydroxide/simethicone Suspension 30 milliLiter(s) Oral every 4 hours PRN Dyspepsia  melatonin 3 milliGRAM(s) Oral at bedtime PRN Insomnia  ondansetron Injectable 4 milliGRAM(s) IV Push every 8 hours PRN Nausea and/or Vomiting      HOME MEDICATIONS:  alendronate 10 mg oral tablet (02-15)  doxycycline monohydrate 100 mg oral capsule (02-17)  losartan 50 mg oral tablet (02-15)      PHYSICAL EXAM:  GENERAL: awake, alert, oriented x 3, NAD  Head: facial stitches noted,  no bleeding, no signs of infectoin  CHEST/LUNG: CTAB, symmetrical expansion, no distress  HEART: RRR , no jvd  ABDOMEN: soft non tender  EXTREMITIES:  rt knee effusion alot better with ice nirali and ace wrap, rt wrist/forearm cellulitis is nearly resolved   Neuro: awake, alert, oriented x 3, no focal deficits noted      
   [place] REED  89y, Female  Allergy: penicillin (Eye Irritation)  sulfa drugs (Eye Irritation)    Hospital Day: 2d    Patient seen and examined earlier today. pt is awake, alert, no acute issues reported  she is feeling better. she ambulated with PT. Rt knee swelling is improving, cellulitis rt wrist also improving    PMH/PSH:  PAST MEDICAL & SURGICAL HISTORY:  Bronchiectasis    Aneurysm of aorta  Throacic    HTN (hypertension)    No significant past surgical history  Excisions of skin cancer on left arm        LAST 24-Hr EVENTS:    VITALS:  T(F): 98.1 (02-17-22 @ 05:00), Max: 98.2 (02-16-22 @ 13:50)  HR: 84 (02-17-22 @ 05:00)  BP: 134/75 (02-17-22 @ 05:00) (131/63 - 140/83)  RR: 16 (02-17-22 @ 05:00)  SpO2: 100% (02-16-22 @ 22:34)          TESTS & MEASUREMENTS:  Weight/BMI  49.7 (02-15-22 @ 20:40)  19.4 (02-15-22 @ 20:40)                          13.7   11.81 )-----------( 257      ( 16 Feb 2022 07:28 )             42.5         02-15    139  |  102  |  20  ----------------------------<  116<H>  4.2   |  26  |  0.6<L>    Ca    9.6      15 Feb 2022 15:42  Mg     1.9     02-15    TPro  6.8  /  Alb  4.2  /  TBili  0.4  /  DBili  x   /  AST  21  /  ALT  19  /  AlkPhos  72  02-15    LIVER FUNCTIONS - ( 15 Feb 2022 15:42 )  Alb: 4.2 g/dL / Pro: 6.8 g/dL / ALK PHOS: 72 U/L / ALT: 19 U/L / AST: 21 U/L / GGT: x           COVID-19 PCR: NotDetec (02-15-22 @ 15:42)    RADIOLOGY, ECG, & ADDITIONAL TESTS:    CT Head No Cont:   ACC: [id number] EXAM:  CT CERVICAL SPINE                        ACC: [id number] EXAM:  CT MAXILLOFACIAL                        ACC: [id number] EXAM:  CT BRAIN                          PROCEDURE DATE:  02/15/2022          INTERPRETATION:  CLINICAL INDICATIONS:  Closed head injury. Status post   fall.    TECHNIQUE:  Noncontrast head, maxillofacial and cervical spine CT was   performed.    Multiple contiguous axial images were obtained from the skull base to the   vertex without the use of intravenous contrast. Reformatted coronal and   sagittal images were were subsequently obtained and reviewed.    Axial 1.3 mm sections from the frontal sinuses through the maxilla.  The   study was supplemented with coronal and sagittal reformatted images.    Intravenous contrast was not administered    Axial images were obtained through the cervical spine using multislice   helical technique.  Reformatted coronal and sagittal images were were   subsequently obtained and reviewed.    COMPARISON: None.    FINDINGS:    CT BRAIN:    There is no evidence for acute intracranial hemorrhage, mass effect or   midline shift.    There is extra-axial ossification overlying the right frontal   parasagittal region possibly representing a calcified meningioma.    There is periventricular white matter hypodensity. There is a focal   hypodensity within the right thalamus.    The basal cisterns are patent. There is no hydrocephalus.    There is no extra-axial collection.    The visualized orbits are unremarkable.    The calvarium is intact, without depressed fracture.    The visualized paranasal sinuses and mastoid air cells are clear.    MAXILLOFACIAL BONES:    Soft tissue swelling involving the left medial periorbital soft tissues   as well as the premaxillary soft tissues with trace subcutaneous air   noted involving the left nasal/periorbital tissues. No evidence of   fracture  .  The paranasal sinuses are well aerated. There is a small mucous retention   cyst or polyp within the left maxillary sinus.    The orbits are unremarkable. The optic nerves and extraocular muscles are   unremarkable. There is no evidence of retrobulbar hematoma or intraocular   hemorrhage.    There is periapical lucency involving left mandibular canine tooth with   buccal cortical dehiscence as well as associated ossific changes of the   mandibular marrow likely representing osteitis.    CT CERVICAL SPINE:    There is no prevertebral soft tissue swelling. There is no splaying of   the spinous processes. The occipital condyles are normal. Lateral masses   of C1 align normally with C2. No lucent fracture line is identified.   There is no spondylolisthesis.    Multilevel degenerative osteoarthritis and degenerative disc disease are   present. Findings include marginal osteophytes, uncovertebral spurring,   loss of normal disc space height, endplate sclerosis, and facet joint   arthrosis.    There is no high-grade spinal canal stenosis, although the spinal canal   contents are suboptimally evaluated inherent to CT technique.    The visualized lung apices are within normal limits.    Miscellaneous:  There is a large calcified nodule involving the right   thyroid. There is a hypodense 1.2 cm left thyroid lobe nodule.    There is a nonspecific subcutaneous nodule involving the left neck   anterior to the lower platysma muscle, series 2 image 53..    IMPRESSION:    CT HEAD:  No acute intracranial pathology or hemorrhage. No acute calvarial   fracture.    Mild chronic microvascular type changes as well as an age-indeterminate   lacunar infarct involving the right thalamus.    Partially calcified 1 cm mass overlying the anterior right frontal lobe   possibly representing a meningioma.    MAXILLOFACIAL BONES:  Soft tissue swelling involving the left medial periorbital, supraorbital   as well as the premaxillary soft tissues with trace subcutaneous air   noted involving the left nasal/periorbital tissues. No evidence of   fracture.    CT CERVICAL SPINE:  No acute fracture or subluxation.    Bilateral thyroid lobe nodules with a prominent 2 cm calcified right   thyroid lobe nodule which can be further evaluated with nonemergent   thyroid ultrasound.    Nonspecific subcutaneous soft tissue nodule in the left aspect and neck   at the level hyoid. This may represent a lymph node or possibly a   sebaceous cyst for which visible examination is advised.    --- End of Report ---            ZAMZAM LEGER MD; Attending Radiologist  This document has been electronically signed. Feb 15 2022  1:21PM (02-15-22 @ 12:55)    RECENT DIAGNOSTIC ORDERS:      MEDICATIONS:  MEDICATIONS  (STANDING):  chlorhexidine 2% Cloths 1 Application(s) Topical <User Schedule>  doxycycline hyclate Capsule 100 milliGRAM(s) Oral every 12 hours  lidocaine 2% Gel 1 Application(s) Topical three times a day  losartan 50 milliGRAM(s) Oral daily    MEDICATIONS  (PRN):  acetaminophen     Tablet .. 650 milliGRAM(s) Oral every 6 hours PRN Temp greater or equal to 38C (100.4F), Mild Pain (1 - 3)  aluminum hydroxide/magnesium hydroxide/simethicone Suspension 30 milliLiter(s) Oral every 4 hours PRN Dyspepsia  melatonin 3 milliGRAM(s) Oral at bedtime PRN Insomnia  ondansetron Injectable 4 milliGRAM(s) IV Push every 8 hours PRN Nausea and/or Vomiting      HOME MEDICATIONS:  alendronate 10 mg oral tablet (02-15)  doxycycline monohydrate 100 mg oral capsule (02-17)  losartan 50 mg oral tablet (02-15)      PHYSICAL EXAM:  GENERAL: awake, alert, oriented x 3, NAD  CHEST/LUNG: CTAB, symmetrical expansion, no distress  HEART: RRR , no jvd  ABDOMEN: soft non tender  EXTREMITIES:  rt knee effusion alot better with ice nirali and ace wrap, rt wrist/forearm cellulitis is also better with doxy
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
## 2022-03-01 DIAGNOSIS — S80.01XA CONTUSION OF RIGHT KNEE, INITIAL ENCOUNTER: ICD-10-CM

## 2022-03-01 DIAGNOSIS — I10 ESSENTIAL (PRIMARY) HYPERTENSION: ICD-10-CM

## 2022-03-01 DIAGNOSIS — S01.81XA LACERATION WITHOUT FOREIGN BODY OF OTHER PART OF HEAD, INITIAL ENCOUNTER: ICD-10-CM
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
## 2022-03-01 DIAGNOSIS — S01.412A LACERATION WITHOUT FOREIGN BODY OF LEFT CHEEK AND TEMPOROMANDIBULAR AREA, INITIAL ENCOUNTER: ICD-10-CM

## 2022-03-01 DIAGNOSIS — R09.82 POSTNASAL DRIP: ICD-10-CM

## 2022-03-01 DIAGNOSIS — R29.6 REPEATED FALLS: ICD-10-CM
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
## 2022-03-01 DIAGNOSIS — Z88.0 ALLERGY STATUS TO PENICILLIN: ICD-10-CM

## 2022-03-01 DIAGNOSIS — I71.2 THORACIC AORTIC ANEURYSM, WITHOUT RUPTURE: ICD-10-CM

## 2022-03-01 DIAGNOSIS — L03.113 CELLULITIS OF RIGHT UPPER LIMB: ICD-10-CM

## 2022-03-01 DIAGNOSIS — D32.0 BENIGN NEOPLASM OF CEREBRAL MENINGES: ICD-10-CM

## 2022-03-01 DIAGNOSIS — J47.9 BRONCHIECTASIS, UNCOMPLICATED: ICD-10-CM

## 2022-03-01 DIAGNOSIS — Z88.2 ALLERGY STATUS TO SULFONAMIDES: ICD-10-CM

## 2022-03-01 DIAGNOSIS — S01.511A LACERATION WITHOUT FOREIGN BODY OF LIP, INITIAL ENCOUNTER: ICD-10-CM

## 2022-03-01 DIAGNOSIS — R26.2 DIFFICULTY IN WALKING, NOT ELSEWHERE CLASSIFIED: ICD-10-CM

## 2022-07-20 ENCOUNTER — INPATIENT (INPATIENT)
Facility: HOSPITAL | Age: 87
LOS: 1 days | Discharge: NURSING, REHAB OR LONG-TERM CARE | End: 2022-07-22
Attending: SURGERY | Admitting: SURGERY

## 2022-07-20 VITALS
HEIGHT: 63 IN | HEART RATE: 100 BPM | RESPIRATION RATE: 18 BRPM | SYSTOLIC BLOOD PRESSURE: 145 MMHG | OXYGEN SATURATION: 95 % | DIASTOLIC BLOOD PRESSURE: 70 MMHG

## 2022-07-20 LAB
ALBUMIN SERPL ELPH-MCNC: 4.2 G/DL — SIGNIFICANT CHANGE UP (ref 3.5–5.2)
ALP SERPL-CCNC: 73 U/L — SIGNIFICANT CHANGE UP (ref 30–115)
ALT FLD-CCNC: 16 U/L — SIGNIFICANT CHANGE UP (ref 0–41)
ANION GAP SERPL CALC-SCNC: 12 MMOL/L — SIGNIFICANT CHANGE UP (ref 7–14)
ANION GAP SERPL CALC-SCNC: 15 MMOL/L — HIGH (ref 7–14)
AST SERPL-CCNC: 18 U/L — SIGNIFICANT CHANGE UP (ref 0–41)
BASOPHILS # BLD AUTO: 0.03 K/UL — SIGNIFICANT CHANGE UP (ref 0–0.2)
BASOPHILS NFR BLD AUTO: 0.2 % — SIGNIFICANT CHANGE UP (ref 0–1)
BILIRUB SERPL-MCNC: 0.4 MG/DL — SIGNIFICANT CHANGE UP (ref 0.2–1.2)
BLD GP AB SCN SERPL QL: SIGNIFICANT CHANGE UP
BUN SERPL-MCNC: 16 MG/DL — SIGNIFICANT CHANGE UP (ref 10–20)
BUN SERPL-MCNC: 19 MG/DL — SIGNIFICANT CHANGE UP (ref 10–20)
CALCIUM SERPL-MCNC: 9.4 MG/DL — SIGNIFICANT CHANGE UP (ref 8.5–10.1)
CALCIUM SERPL-MCNC: 9.6 MG/DL — SIGNIFICANT CHANGE UP (ref 8.5–10.1)
CHLORIDE SERPL-SCNC: 100 MMOL/L — SIGNIFICANT CHANGE UP (ref 98–110)
CHLORIDE SERPL-SCNC: 98 MMOL/L — SIGNIFICANT CHANGE UP (ref 98–110)
CO2 SERPL-SCNC: 23 MMOL/L — SIGNIFICANT CHANGE UP (ref 17–32)
CO2 SERPL-SCNC: 26 MMOL/L — SIGNIFICANT CHANGE UP (ref 17–32)
CREAT SERPL-MCNC: 0.6 MG/DL — LOW (ref 0.7–1.5)
CREAT SERPL-MCNC: 0.6 MG/DL — LOW (ref 0.7–1.5)
EGFR: 84 ML/MIN/1.73M2 — SIGNIFICANT CHANGE UP
EGFR: 84 ML/MIN/1.73M2 — SIGNIFICANT CHANGE UP
EOSINOPHIL # BLD AUTO: 0 K/UL — SIGNIFICANT CHANGE UP (ref 0–0.7)
EOSINOPHIL NFR BLD AUTO: 0 % — SIGNIFICANT CHANGE UP (ref 0–8)
GIANT PLATELETS BLD QL SMEAR: PRESENT — SIGNIFICANT CHANGE UP
GLUCOSE SERPL-MCNC: 125 MG/DL — HIGH (ref 70–99)
GLUCOSE SERPL-MCNC: 137 MG/DL — HIGH (ref 70–99)
HCT VFR BLD CALC: 40.5 % — SIGNIFICANT CHANGE UP (ref 37–47)
HCT VFR BLD CALC: 42.5 % — SIGNIFICANT CHANGE UP (ref 37–47)
HGB BLD-MCNC: 13.9 G/DL — SIGNIFICANT CHANGE UP (ref 12–16)
HGB BLD-MCNC: 14.1 G/DL — SIGNIFICANT CHANGE UP (ref 12–16)
IMM GRANULOCYTES NFR BLD AUTO: 0.5 % — HIGH (ref 0.1–0.3)
LYMPHOCYTES # BLD AUTO: 0.44 K/UL — LOW (ref 1.2–3.4)
LYMPHOCYTES # BLD AUTO: 2.5 % — LOW (ref 20.5–51.1)
MAGNESIUM SERPL-MCNC: 2 MG/DL — SIGNIFICANT CHANGE UP (ref 1.8–2.4)
MCHC RBC-ENTMCNC: 29.7 PG — SIGNIFICANT CHANGE UP (ref 27–31)
MCHC RBC-ENTMCNC: 30.2 PG — SIGNIFICANT CHANGE UP (ref 27–31)
MCHC RBC-ENTMCNC: 33.2 G/DL — SIGNIFICANT CHANGE UP (ref 32–37)
MCHC RBC-ENTMCNC: 34.3 G/DL — SIGNIFICANT CHANGE UP (ref 32–37)
MCV RBC AUTO: 87.9 FL — SIGNIFICANT CHANGE UP (ref 81–99)
MCV RBC AUTO: 89.5 FL — SIGNIFICANT CHANGE UP (ref 81–99)
MONOCYTES # BLD AUTO: 0.76 K/UL — HIGH (ref 0.1–0.6)
MONOCYTES NFR BLD AUTO: 4.4 % — SIGNIFICANT CHANGE UP (ref 1.7–9.3)
NEUTROPHILS # BLD AUTO: 16.01 K/UL — HIGH (ref 1.4–6.5)
NEUTROPHILS NFR BLD AUTO: 92.4 % — HIGH (ref 42.2–75.2)
NEUTS BAND # BLD: 2 % — SIGNIFICANT CHANGE UP (ref 0–6)
NRBC # BLD: 0 /100 WBCS — SIGNIFICANT CHANGE UP (ref 0–0)
NRBC # BLD: 0 /100 WBCS — SIGNIFICANT CHANGE UP (ref 0–0)
NRBC # BLD: 0 /100 — SIGNIFICANT CHANGE UP (ref 0–0)
PHOSPHATE SERPL-MCNC: 3.8 MG/DL — SIGNIFICANT CHANGE UP (ref 2.1–4.9)
PLAT MORPH BLD: NORMAL — SIGNIFICANT CHANGE UP
PLATELET # BLD AUTO: 231 K/UL — SIGNIFICANT CHANGE UP (ref 130–400)
PLATELET # BLD AUTO: 242 K/UL — SIGNIFICANT CHANGE UP (ref 130–400)
PLATELET COUNT - ESTIMATE: NORMAL — SIGNIFICANT CHANGE UP
POTASSIUM SERPL-MCNC: 4.2 MMOL/L — SIGNIFICANT CHANGE UP (ref 3.5–5)
POTASSIUM SERPL-MCNC: 5.6 MMOL/L — HIGH (ref 3.5–5)
POTASSIUM SERPL-SCNC: 4.2 MMOL/L — SIGNIFICANT CHANGE UP (ref 3.5–5)
POTASSIUM SERPL-SCNC: 5.6 MMOL/L — HIGH (ref 3.5–5)
PROT SERPL-MCNC: 6.7 G/DL — SIGNIFICANT CHANGE UP (ref 6–8)
RBC # BLD: 4.61 M/UL — SIGNIFICANT CHANGE UP (ref 4.2–5.4)
RBC # BLD: 4.75 M/UL — SIGNIFICANT CHANGE UP (ref 4.2–5.4)
RBC # FLD: 13.6 % — SIGNIFICANT CHANGE UP (ref 11.5–14.5)
RBC # FLD: 13.9 % — SIGNIFICANT CHANGE UP (ref 11.5–14.5)
RBC BLD AUTO: NORMAL — SIGNIFICANT CHANGE UP
SARS-COV-2 RNA SPEC QL NAA+PROBE: SIGNIFICANT CHANGE UP
SODIUM SERPL-SCNC: 136 MMOL/L — SIGNIFICANT CHANGE UP (ref 135–146)
SODIUM SERPL-SCNC: 138 MMOL/L — SIGNIFICANT CHANGE UP (ref 135–146)
WBC # BLD: 17.32 K/UL — HIGH (ref 4.8–10.8)
WBC # BLD: 18.83 K/UL — HIGH (ref 4.8–10.8)
WBC # FLD AUTO: 17.32 K/UL — HIGH (ref 4.8–10.8)
WBC # FLD AUTO: 18.83 K/UL — HIGH (ref 4.8–10.8)

## 2022-07-20 PROCEDURE — 70450 CT HEAD/BRAIN W/O DYE: CPT | Mod: 26,MA

## 2022-07-20 PROCEDURE — 71045 X-RAY EXAM CHEST 1 VIEW: CPT | Mod: 26

## 2022-07-20 PROCEDURE — 93010 ELECTROCARDIOGRAM REPORT: CPT

## 2022-07-20 PROCEDURE — 72170 X-RAY EXAM OF PELVIS: CPT | Mod: 26

## 2022-07-20 PROCEDURE — 70498 CT ANGIOGRAPHY NECK: CPT | Mod: 26

## 2022-07-20 PROCEDURE — 99285 EMERGENCY DEPT VISIT HI MDM: CPT | Mod: 25

## 2022-07-20 PROCEDURE — 99291 CRITICAL CARE FIRST HOUR: CPT | Mod: 24,25

## 2022-07-20 PROCEDURE — 72125 CT NECK SPINE W/O DYE: CPT | Mod: 26,MA

## 2022-07-20 PROCEDURE — 99283 EMERGENCY DEPT VISIT LOW MDM: CPT

## 2022-07-20 PROCEDURE — 12013 RPR F/E/E/N/L/M 2.6-5.0 CM: CPT

## 2022-07-20 PROCEDURE — 99223 1ST HOSP IP/OBS HIGH 75: CPT

## 2022-07-20 RX ORDER — LOSARTAN POTASSIUM 100 MG/1
50 TABLET, FILM COATED ORAL DAILY
Refills: 0 | Status: DISCONTINUED | OUTPATIENT
Start: 2022-07-21 | End: 2022-07-22

## 2022-07-20 RX ORDER — INSULIN HUMAN 100 [IU]/ML
10 INJECTION, SOLUTION SUBCUTANEOUS ONCE
Refills: 0 | Status: COMPLETED | OUTPATIENT
Start: 2022-07-20 | End: 2022-07-20

## 2022-07-20 RX ORDER — LOSARTAN POTASSIUM 100 MG/1
50 TABLET, FILM COATED ORAL DAILY
Refills: 0 | Status: DISCONTINUED | OUTPATIENT
Start: 2022-07-20 | End: 2022-07-20

## 2022-07-20 RX ORDER — DEXTROSE 50 % IN WATER 50 %
50 SYRINGE (ML) INTRAVENOUS ONCE
Refills: 0 | Status: COMPLETED | OUTPATIENT
Start: 2022-07-20 | End: 2022-07-20

## 2022-07-20 RX ORDER — SODIUM CHLORIDE 9 MG/ML
1000 INJECTION, SOLUTION INTRAVENOUS
Refills: 0 | Status: DISCONTINUED | OUTPATIENT
Start: 2022-07-20 | End: 2022-07-22

## 2022-07-20 RX ORDER — CALCIUM GLUCONATE 100 MG/ML
2 VIAL (ML) INTRAVENOUS ONCE
Refills: 0 | Status: COMPLETED | OUTPATIENT
Start: 2022-07-20 | End: 2022-07-20

## 2022-07-20 RX ORDER — ACETAMINOPHEN 500 MG
650 TABLET ORAL EVERY 6 HOURS
Refills: 0 | Status: DISCONTINUED | OUTPATIENT
Start: 2022-07-20 | End: 2022-07-22

## 2022-07-20 RX ADMIN — SODIUM CHLORIDE 85 MILLILITER(S): 9 INJECTION, SOLUTION INTRAVENOUS at 19:13

## 2022-07-20 NOTE — H&P ADULT - ATTENDING COMMENTS
This is 89 y/o female w/ PMHx of HTN, aortic aneurysm seen as Trauma Consult s/p blunt head trauma. Patient reports while taking her blood pressure medications she fell forward and hit her forehead on the counter. +HT, -LOC, -AC. Patient did not fall onto the ground and was able to ambulate afterwards. Patient reports obtaining a forehead laceration that was bleeding which brought her to the ED at [facility]. Laceration was repaired, imaging was done and patient was sent to [facility]. Patient reports having head pain and mild posterior neck pain that does not radiate.    Primary and secondary surveys were performed.    AAO x3  GCS 15.  Neuro intact.  Forehead hematoma.  Neck: mild pain to palpation of midline, C-collar in place  Chest: clear.  CV : RRR  Abdomen: soft, nontender  Extr: no deformities    All images and labs were reviewed.    ASSESSMENT:  89 y/o female, S/P Fall.  Closed head injury.  Forehead hematoma.  C1 Fracture.  C2 Type II Odontoid Fracture.  Acute pain due to trauma.  At risk for respiratory complications.    PLAN:  - keep C-collar at all time  - Neurosurgery eval  - intermittent neuro checks  - aspiration precautions  - pain control  - O2sat monitoring  - S&S eval  - DVT prophylaxis  Admit to SDU This is 89 y/o female w/ PMHx of HTN, aortic aneurysm seen as Trauma Consult s/p blunt head trauma. Patient reports while taking her blood pressure medications she fell forward and hit her forehead on the counter. +HT, -LOC, -AC. Patient did not fall onto the ground and was able to ambulate afterwards. Patient reports obtaining a forehead laceration that was bleeding which brought her to the ED at [facility]. Laceration was repaired, imaging was done and patient was sent to [facility]. Patient reports having head pain and mild posterior neck pain that does not radiate.    Primary and secondary surveys were performed.    AAO x3  GCS 15.  Neuro intact.  Forehead hematoma.  Neck: mild pain to palpation of midline, C-collar in place  Chest: clear.  CV : RRR  Abdomen: soft, nontender  Extr: no deformities    All images and labs were reviewed.    ASSESSMENT:  89 y/o female, S/P Fall.  Closed head injury.  Forehead hematoma.  C1 Fracture.  C2 Type II Odontoid Fracture.  Acute pain due to trauma.  At risk for respiratory complications.    PLAN:  - keep C-collar at all time  - get CTA neck  - Neurosurgery eval  - intermittent neuro checks  - aspiration precautions  - pain control  - O2sat monitoring  - S&S eval  - DVT prophylaxis  Admit to SDU

## 2022-07-20 NOTE — ED ADULT NURSE NOTE - NSIMPLEMENTINTERV_GEN_ALL_ED
Implemented All Fall with Harm Risk Interventions:  [place] to call system. Call bell, personal items and telephone within reach. Instruct patient to call for assistance. Room bathroom lighting operational. Non-slip footwear when patient is off stretcher. Physically safe environment: no spills, clutter or unnecessary equipment. Stretcher in lowest position, wheels locked, appropriate side rails in place. Provide visual cue, wrist band, yellow gown, etc. Monitor gait and stability. Monitor for mental status changes and reorient to person, place, and time. Review medications for side effects contributing to fall risk. Reinforce activity limits and safety measures with patient and family. Provide visual clues: red socks.

## 2022-07-20 NOTE — ED PROVIDER NOTE - ATTENDING APP SHARED VISIT CONTRIBUTION OF CARE
89-year-old female presents to the ER for hypertension, thoracic aortic aneurysm presents to the ER after she was bending forward to get her medications and hit her head on the table.  Denies LOC, neck pain, visual changes, focal weakness or numbness.    Vitals noted, triage note reviewed    Gen - NAD, Head - + 3cm laceration to forehead with significant swelling, Neck- no midline ttp, Pharynx - clear, MMM, Heart - RRR, no m/g/r, Lungs - CTAB, no w/c/r, Abdomen - soft, NT, ND, Skin - No rash, Extremities - FROM, no edema, erythema, ecchymosis, brisk cap refill, Neuro - A&O x3, equal strength and sensation, non-focal exam    a/p:  +HT no LOC or AC  CT brain, C-spine  laceration repair  tetanus updated in Feb 2022  reassess

## 2022-07-20 NOTE — ED ADULT NURSE REASSESSMENT NOTE - NS ED NURSE REASSESS COMMENT FT1
Patient assessed , alert and oriented x4, transfer from [facility] for trauma consult. patient fell while bending over taking meds in home today. denies hitting head or LOC. No s/s of distress noted. IVL. Safety and comfort maintained.

## 2022-07-20 NOTE — ED PROVIDER NOTE - NS ED ROS FT
Constitutional: (-) fever, (-) chills  Eyes: (-) visual changes  ENT: (-) nasal congestions  Cardiovascular: (-) chest pain, (-) syncope  Respiratory: (-) cough, (-) shortness of breath, (-) dyspnea,   Gastrointestinal: (-) vomiting, (-) diarrhea, (-)nausea,  Musculoskeletal: (+) neck pain, (-) back pain, (-) joint pain,  Integumentary: (-) rash, (-) edema, (-) bruises (+) lac  Neurological: (-) headache, (-) loc, (-) dizziness, (-) tingling, (-)numbness,  Peripheral Vascular: (-) leg swelling  :  (-)dysuria,  (-) hematuria  Allergic/Immunologic: (-) pruritus

## 2022-07-20 NOTE — ED PROVIDER NOTE - CARE PLAN
1 Principal Discharge DX:	Closed nondisplaced type II dens fracture  Secondary Diagnosis:	Closed C1 fracture

## 2022-07-20 NOTE — H&P ADULT - HISTORY OF PRESENT ILLNESS
89yF w/ PMHx of *** seen as (Code Trauma / Trauma Alert / Trauma Consult) s/p ******.  Trauma assessment in ED: ABCs intact , GCS 15 , AAOx3.    PAST MEDICAL & SURGICAL HISTORY:  Bronchiectasis  Aneurysm of aorta  Throacic    HTN (hypertension)    No significant past surgical history  Excisions of skin cancer on left arm   89yF w/ PMHx of HTN, aortic aneurysm seen as Trauma Consult s/p blunt head trauma. Patient reports while taking her blood pressure medications she fell forward and hit her forehead on the counter. +HT, -LOC, -AC. Patient did not fall onto the ground and was able to ambulate afterwards. Patient reports obtaining a forehead laceration that was bleeding which brought her to the ED at [facility]. Laceration was repaired, imaging was done and patient was sent to [facility]. Patient reports having head pain and mild posterior neck pain that does not radiate. Denies CP, SOB, abdominal pain, extremity weakness/numbness. Trauma assessment in ED: ABCs intact , GCS 15 , AAOx3.

## 2022-07-20 NOTE — ED PROVIDER NOTE - CLINICAL SUMMARY MEDICAL DECISION MAKING FREE TEXT BOX
89-year-old female presents to the ER for hypertension, thoracic aortic aneurysm presents to the ER after she was bending forward to get her medications and hit her head on the table. + laceration, hematoma to forehead. Labs and imaging reviewed. Pt sustained type 2 dens fracture and multiple fractures of C1. Patient transferred to [facility] for trauma eval/NSG. 89-year-old female presents to the ER for hypertension, thoracic aortic aneurysm presents to the ER after she was bending forward to get her medications and hit her head on the table. + laceration, hematoma to forehead. Labs and imaging reviewed. Pt sustained type 2 dens fracture and multiple fractures of C1. Patient transferred to [facility] for trauma eval/NSG.    At [place] site - patient HD stable. Consulted both trauma and neurosurgery who evaluated patient in ED - recommending admission to trauma's service for further management. HD stable at time of admission.

## 2022-07-20 NOTE — ED ADULT TRIAGE NOTE - HEIGHT IN FEET
[facility]  Cardiac Electrophysiology  Progress Note    Admission Date: 5/6/2019  Code Status: Full Code   Attending Physician: Pricilla Horne MD   Expected Discharge Date: 5/14/2019  Principal Problem:TREY (acute kidney injury)    Subjective:     Interval History: Patient reports feeling symptomatically improved this morning; he feels stronger and more energetic. He reports mild continued abdominal discomfort. He does not report chest pain, dyspnea, fever, chills or night sweats.    Review of Systems   Constitution: Negative.   HENT: Negative.    Eyes: Negative.    Cardiovascular: Negative.    Respiratory: Positive for cough.    Endocrine: Negative.    Hematologic/Lymphatic: Negative.    Skin: Negative.    Musculoskeletal: Negative.    Gastrointestinal: Positive for abdominal pain.   Genitourinary: Negative.    Neurological: Negative.    Psychiatric/Behavioral: Negative.      Objective:     Vital Signs (Most Recent):  Temp: 97.6 °F (36.4 °C) (05/08/19 0503)  Pulse: (!) 59 (05/08/19 0503)  Resp: 18 (05/08/19 0503)  BP: (!) 103/56 (05/08/19 0503)  SpO2: (!) 94 % (05/08/19 0503) Vital Signs (24h Range):  Temp:  [96 °F (35.6 °C)-97.9 °F (36.6 °C)] 97.6 °F (36.4 °C)  Pulse:  [34-62] 59  Resp:  [16-18] 18  SpO2:  [94 %-96 %] 94 %  BP: ()/(55-65) 103/56     Weight: 84.2 kg (185 lb 11.8 oz)  Body mass index is 24.51 kg/m².     SpO2: (!) 94 %  O2 Device (Oxygen Therapy): room air    Physical Exam   Constitutional: He is oriented to person, place, and time. He appears well-developed and well-nourished. No distress.   HENT:   Head: Normocephalic and atraumatic.   Neck: No JVD present.   Cardiovascular: Normal rate, regular rhythm, normal heart sounds and intact distal pulses. Exam reveals no gallop and no friction rub.   No murmur heard.  Device pocket without signs of infection   Pulmonary/Chest: Effort normal and breath sounds normal. No respiratory distress. He has no wheezes. He has no rales.    Abdominal: Soft. Bowel sounds are normal. He exhibits no distension. There is no tenderness.   Musculoskeletal: He exhibits no edema.   Neurological: He is alert and oriented to person, place, and time.   Skin: He is not diaphoretic.       Significant Labs:     Recent Results (from the past 24 hour(s))   POCT glucose    Collection Time: 05/07/19  7:53 AM   Result Value Ref Range    POCT Glucose 169 (H) 70 - 110 mg/dL   POCT glucose    Collection Time: 05/07/19 11:23 AM   Result Value Ref Range    POCT Glucose 150 (H) 70 - 110 mg/dL   Vancomycin, random    Collection Time: 05/07/19 12:20 PM   Result Value Ref Range    Vancomycin, Random <1.1 Not established ug/mL   POCT glucose    Collection Time: 05/07/19  4:44 PM   Result Value Ref Range    POCT Glucose 121 (H) 70 - 110 mg/dL   Magnesium    Collection Time: 05/07/19  5:16 PM   Result Value Ref Range    Magnesium 2.5 1.6 - 2.6 mg/dL   Basic metabolic panel    Collection Time: 05/07/19  5:16 PM   Result Value Ref Range    Sodium 132 (L) 136 - 145 mmol/L    Potassium 3.8 3.5 - 5.1 mmol/L    Chloride 101 95 - 110 mmol/L    CO2 18 (L) 23 - 29 mmol/L    Glucose 99 70 - 110 mg/dL    BUN, Bld 96 (H) 8 - 23 mg/dL    Creatinine 3.0 (H) 0.5 - 1.4 mg/dL    Calcium 8.7 8.7 - 10.5 mg/dL    Anion Gap 13 8 - 16 mmol/L    eGFR if African American 22.3 (A) >60 mL/min/1.73 m^2    eGFR if non  19.3 (A) >60 mL/min/1.73 m^2   POCT glucose    Collection Time: 05/07/19  9:53 PM   Result Value Ref Range    POCT Glucose 66 (L) 70 - 110 mg/dL   POCT glucose    Collection Time: 05/08/19 12:14 AM   Result Value Ref Range    POCT Glucose 121 (H) 70 - 110 mg/dL   Comprehensive metabolic panel    Collection Time: 05/08/19  5:13 AM   Result Value Ref Range    Sodium 133 (L) 136 - 145 mmol/L    Potassium 3.6 3.5 - 5.1 mmol/L    Chloride 101 95 - 110 mmol/L    CO2 20 (L) 23 - 29 mmol/L    Glucose 78 70 - 110 mg/dL    BUN, Bld 97 (H) 8 - 23 mg/dL    Creatinine 3.2 (H) 0.5 - 1.4  mg/dL    Calcium 8.4 (L) 8.7 - 10.5 mg/dL    Total Protein 6.2 6.0 - 8.4 g/dL    Albumin 2.7 (L) 3.5 - 5.2 g/dL    Total Bilirubin 0.9 0.1 - 1.0 mg/dL    Alkaline Phosphatase 122 55 - 135 U/L    AST 24 10 - 40 U/L    ALT 15 10 - 44 U/L    Anion Gap 12 8 - 16 mmol/L    eGFR if African American 20.6 (A) >60 mL/min/1.73 m^2    eGFR if non  17.8 (A) >60 mL/min/1.73 m^2   Magnesium    Collection Time: 05/08/19  5:13 AM   Result Value Ref Range    Magnesium 2.3 1.6 - 2.6 mg/dL   CBC auto differential    Collection Time: 05/08/19  5:13 AM   Result Value Ref Range    WBC 11.21 3.90 - 12.70 K/uL    RBC 3.64 (L) 4.60 - 6.20 M/uL    Hemoglobin 10.7 (L) 14.0 - 18.0 g/dL    Hematocrit 31.9 (L) 40.0 - 54.0 %    Mean Corpuscular Volume 88 82 - 98 fL    Mean Corpuscular Hemoglobin 29.4 27.0 - 31.0 pg    Mean Corpuscular Hemoglobin Conc 33.5 32.0 - 36.0 g/dL    RDW 17.0 (H) 11.5 - 14.5 %    Platelets 91 (L) 150 - 350 K/uL    MPV SEE COMMENT 9.2 - 12.9 fL    Immature Granulocytes 0.4 0.0 - 0.5 %    Gran # (ANC) 9.0 (H) 1.8 - 7.7 K/uL    Immature Grans (Abs) 0.04 0.00 - 0.04 K/uL    Lymph # 0.8 (L) 1.0 - 4.8 K/uL    Mono # 1.3 (H) 0.3 - 1.0 K/uL    Eos # 0.0 0.0 - 0.5 K/uL    Baso # 0.03 0.00 - 0.20 K/uL    nRBC 0 0 /100 WBC    Gran% 80.6 (H) 38.0 - 73.0 %    Lymph% 7.0 (L) 18.0 - 48.0 %    Mono% 11.7 4.0 - 15.0 %    Eosinophil% 0.0 0.0 - 8.0 %    Basophil% 0.3 0.0 - 1.9 %    Differential Method Automated          Significant Imaging:     I have reviewed all pertinent imaging studies from the last 24 hours      Assessment and Plan:     Miles  CHADS VASc 8  Patient presented for RICARDO/DCCV which was cancelled due to leukocytosis and TREY  Currently rate controlled with toprol 25 mg daily  apixaban held in the setting of hemoptysis  Possible RICARDO/DCCV when acute issues (infection/TREY) have resolved      EP will sign off, RICARDO/DCCV can be considered while the patient is inpatient if he is medically optimized.    Valentin  5 MD Sofi  Cardiac Electrophysiology  [facility]

## 2022-07-20 NOTE — ED ADULT TRIAGE NOTE - CHIEF COMPLAINT QUOTE
As per patient's "Around 830 this morning, I leaned forward hitting my head on the table, I didn't fall to the ground". Denies LOC, denies anticoagulant

## 2022-07-20 NOTE — ED PROVIDER NOTE - OBJECTIVE STATEMENT
89 yo female, pmh of htn, presents to ed for chi and lac. pt was bending forward, hit forehead on table, + lac, utd on td, also c/o neck pain, mild, aching, no radiation. Denies fever, chills, cp, sob, back pain, abd pain, visual changes, nvd, dizziness, numbness, tingling, loc.

## 2022-07-20 NOTE — PATIENT PROFILE ADULT - HOW PATIENT ADDRESSED, PROFILE
Chief Complaint   Patient presents with   • Medicare Wellness Visit   • Medication Management         HISTORY OF PRESENT ILLNESS:  Compa Ybarra is a 81 year old male who is here for followup of medical conditions and medications.  The patient did touch base with his cardiologist and had a phone interview in April.  No overall change in his medications or his cardiac status.  He has generally been eating pretty well and and perhaps get a little bit of weight.  He otherwise continues on the same medicines as before.  The patient states he did need a renewal of his steroid cream that he uses periodically when he gets poison ivy.  His Ambien is not working as well as it used to.  He states that he has difficulty getting to sleep but can not stay asleep.  He looks like he has tried trazodone in the past but did not help.  And continue seeing his urologist for his prostate cancer and his PSA has been relatively stable in the 4 range on the last couple checks.    REVIEW OF SYSTEMS:  Otherwise negative.    ALLERGIES:   Allergen Reactions   • Amlodipine SWELLING     Bilateral leg edema, noted 10/3/2018   • Flu Virus Vaccine Other (See Comments)     H/o Guillain-[place]   • Pneumococcal Vaccines Other (See Comments)     H/o Guillain-[place]   • Tetanus Toxoids Other (See Comments)     H/o Guillain-[place]     Current Outpatient Medications   Medication Sig Dispense Refill   • dutasteride (AVODART) 0.5 MG capsule TAKE 1 CAPSULE BY MOUTH DAILY 90 capsule 1   • ipratropium (ATROVENT) 0.06 % nasal spray Spray 2 sprays in each nostril 3 times daily. 60 mL 5   • fluticasone (FLONASE) 50 MCG/ACT nasal spray Spray 1 spray in each nostril daily. SHAKE LIQUID 16 mL 0   • folic acid (FOLVITE) 800 MCG tablet Take 400 mcg by mouth daily.     • atorvastatin (LIPITOR) 40 MG tablet Take 40 mg by mouth daily.     • Coenzyme Q10 200 MG Cap Take by mouth daily.     • Magnesium 400 MG Cap Take 400 mg by mouth daily.     • DISPENSE      • DISPENSE       • metoPROLOL tartrate (LOPRESSOR) 25 MG tablet TAKE 1 TABLET BY MOUTH TWICE DAILY 180 tablet 1   • Valacyclovir HCl (VALTREX) 1000 MG Tab Take 2 tabs twice a day x 1 day prn cold sores 20 tablet 0   • benazepril (LOTENSIN) 10 MG tablet TAKE 1 TABLET BY MOUTH DAILY 90 tablet 1   • sildenafil (VIAGRA) 100 MG tablet Take 1 tablet by mouth as needed for Erectile Dysfunction. 30 tablet 0   • ranolazine (RANEXA) 500 MG 12 hr tablet Take 500 mg by mouth 2 times daily.     • aspirin 81 MG tablet Take 81 mg by mouth daily.     • esomeprazole (NEXIUM) 40 MG capsule Take 40 mg by mouth daily (before breakfast).     • clobetasol (TEMOVATE) 0.05 % cream Apply as directed to affected area 30 g 0   • ramelteon (ROZEREM) 8 MG tablet Take 1 tablet by mouth nightly. 30 tablet 0     No current facility-administered medications for this visit.          PHYSICAL EXAMINATION:  Visit Vitals  /70 (BP Location: LUE - Left upper extremity, Patient Position: Sitting, Cuff Size: Regular)   Pulse (!) 58   Ht 5' 11\" (1.803 m)   Wt 87.5 kg   SpO2 97%   BMI 26.92 kg/m²     GENERAL APPEARANCE:  Not in acute distress.   LUNGS:  Clear bilaterally.  CARDIOVASCULAR:  Regular rate and rhythm.  No murmurs, rubs or gallops.  MOOD: calm and appropropriate  NEUROLOGIC:  Non-focal.    ASSESSMENT AND PLAN:  Essential hypertension  (primary encounter diagnosis)  Plan: BASIC METABOLIC PANEL        Stable on current medication    Dyslipidemia  Plan: HEPATIC FUNCTION PANEL, LIPID PANEL WITH REFLEX        Has been stable on current medication and will follow-up in 6 months with labs as ordered    Insomnia, unspecified type  Has tried Ambien, Ambien CR and trazodone but the Ambien he has not been working as well as as it has  Plan:  DC Ambien and will give trial of Rozerem    rtc 6 months       Constance

## 2022-07-20 NOTE — H&P ADULT - NSHPPHYSICALEXAM_GEN_ALL_CORE
Secondary Survey:   General: NAD  HEENT: Normocephalic, atraumatic, EOMI, no scalp lacerations   Neck: Soft, midline trachea. +c-spinal tenderness; c-collar in place   Chest: No chest wall tenderness, no subcutaneous emphysema   Cardiac: S1, S2  Respiratory: Bilateral breath sounds, normal respiratory effort  Abdomen: Soft, non-distended, non-tender, no rebound, no guarding  Groin: Normal appearing, pelvis stable   Ext:  Moving b/l upper and lower extremities. Palpable Radial b/l UE, b/l DP palpable in LE.   Back: No T/L/S spine tenderness, No palpable runoff/stepoff/deformity

## 2022-07-20 NOTE — PATIENT PROFILE ADULT - NSPROPASSIVESMOKEEXPOSURE_GEN_A_NUR
Psychiatric/Behavioral: Negative for confusion.        Physical Exam     ED Triage Vitals [11/08/21 2214]   ED Triage Vitals Group      Temp 97.6 °F (36.4 °C)      Heart Rate 71      Resp 18      /82      SpO2 98 %      EtCO2 mmHg       Height 5' 4\" (1.626 m)      Weight 140 lb (63.5 kg)      Weight Scale Used ED Estimate      BMI (Calculated) 24.03      IBW/kg (Calculated) 54.7       Visit Vitals  /71   Pulse (!) 56   Temp 96.8 °F (36 °C) (Tympanic)   Resp 14   Ht 5' 3\" (1.6 m)   Wt 61.2 kg (134 lb 14.7 oz)   SpO2 97%   BMI 23.90 kg/m²        Pulse Oximetry Interpretation: 98% on room air which is normal for this patient.  Lory Angel MD    Physical Exam  Constitutional:       General: She is not in acute distress.     Appearance: She is not ill-appearing.   HENT:      Head: Normocephalic and atraumatic.   Eyes:      Extraocular Movements: Extraocular movements intact.      Conjunctiva/sclera: Conjunctivae normal.   Cardiovascular:      Rate and Rhythm: Normal rate and regular rhythm.   Pulmonary:      Effort: Pulmonary effort is normal.      Breath sounds: Normal breath sounds.   Abdominal:      Palpations: Abdomen is soft.      Tenderness: There is no abdominal tenderness.   Musculoskeletal:         General: No swelling or deformity.      Cervical back: Normal range of motion and neck supple.   Skin:     General: Skin is warm and dry.   Neurological:      General: No focal deficit present.      Mental Status: She is oriented to person, place, and time.           ED Course     Procedures      Lab Results     Results for orders placed or performed during the hospital encounter of 11/08/21   Comprehensive 9 <52 ng/L   Thyroid Stimulating Hormone Reflex   Result Value Ref Range    TSH 1.337 0.350 - 5.000 mcUnits/mL   Urinalysis & Reflex Microscopy With Culture If Indicated   Result Value Ref Range    COLOR, URINALYSIS Yellow     APPEARANCE, URINALYSIS Clear     GLUCOSE, URINALYSIS Negative Negative mg/dL    BILIRUBIN, URINALYSIS Negative Negative    KETONES, URINALYSIS Negative Negative mg/dL    SPECIFIC GRAVITY, URINALYSIS <=1.005 1.005 - 1.030    OCCULT BLOOD, URINALYSIS Negative Negative    PH, URINALYSIS 7.0 5.0 - 7.0    PROTEIN, URINALYSIS Negative Negative mg/dL    UROBILINOGEN, URINALYSIS 0.2 0.2, 1.0 mg/dL    NITRITE, URINALYSIS Negative Negative    LEUKOCYTE ESTERASE, URINALYSIS Negative Negative   NT proBNP   Result Value Ref Range    NT-proBNP 157 <=450 pg/mL   Comprehensive Metabolic Panel   Result Value Ref Range    Fasting Status      Sodium 130 (L) 135 - 145 mmol/L    Potassium 3.5 3.4 - 5.1 mmol/L    Chloride 91 (L) 98 - 107 mmol/L    Carbon Dioxide 29 21 - 32 mmol/L    Anion Gap 14 10 - 20 mmol/L    Glucose 89 70 - 99 mg/dL    BUN 12 6 - 20 mg/dL    Creatinine 0.59 0.51 - 0.95 mg/dL    Glomerular Filtration Rate 87 >=60    BUN/ Creatinine Ratio 20 7 - 25    Calcium 8.6 8.4 - 10.2 mg/dL    Bilirubin, Total 0.7 0.2 - 1.0 mg/dL    GOT/AST 17 <=37 Units/L    GPT/ALT 21 <64 Units/L    Alkaline Phosphatase 53 45 - 117 Units/L    Albumin 3.7 3.6 - 5.1 g/dL    Protein, Total 6.9 6.4 - 8.2 g/dL    Globulin 3.2 2.0 - 4.0 g/dL    A/G Ratio 1.2 1.0 - 2.4   Magnesium   Result Value Ref Range    Magnesium 1.7 1.7 - 2.4 mg/dL   Phosphorus   Result Value Ref Range     No

## 2022-07-20 NOTE — ED PROVIDER NOTE - PHYSICAL EXAMINATION
Physical Exam    Vital Signs: I have reviewed the initial vital signs.  Constitutional: appears stated age, no acute distress  Eyes: Conjunctiva pink, Sclera clear  Cardiovascular: S1 and S2, regular rate, regular rhythm, well-perfused extremities, radial pulses equal and 2+, pedal pulses 2+ and equal  Respiratory: unlabored respiratory effort, clear to auscultation bilaterally no wheezing, rales and rhonchi  Gastrointestinal: soft, non-tender abdomen, no pulsatile mass, normal bowl sounds  Musculoskeletal: supple neck, no lower extremity edema, + c spine ttp  Integumentary: 3cm lac to forehead  Neurologic: awake, alert, nvi

## 2022-07-20 NOTE — H&P ADULT - ASSESSMENT
ASSESSMENT:  89y Female  w/ PMHx of *** seen as (Code Trauma / Trauma Alert / Trauma Consult) s/p ****** with complaint of *** , external signs of trauma include *** . Trauma assessment in ED: ABCs intact , GCS 15 , AAOx3,  MOSCOSO.     Injuries identified:   -   -   -     PLAN:   - Trauma Labs: (CBC, BMP, Coags, T&S, UA, EtOH level)  Additional studies:  EKG  Utox    Trauma Imaging to include the following:  - CXR, Pelvic Xray  - CT Head,  CT C-spine, CT Max/Face, CT Chest, CT Abd/Pelvis  - Extremity films: None    Additional consultations:  - Neurosurgery  - Orthopedics  - OMFS  - PT/Rehab/SW  - Hospitalist/Medicine     Disposition pending results of above labs and imaging  Above plan discussed with Trauma attending,  ***  , patient, patient family, and ED team  --------------------------------------------------------------------------------------  07-20-22 @ 17:39    TRAUMA SENIOR SPECTRA: [id number]  TRAUMA TEAM SPECTRA: [id number] ASSESSMENT:  89yF w/ PMHx of HTN, aortic aneurysm seen as Trauma Consult s/p blunt head trauma. Patient reports while taking her blood pressure medications she fell forward and hit her forehead on the counter. +HT, -LOC, -AC. Patient did not fall onto the ground and was able to ambulate afterwards. Trauma assessment in ED: ABCs intact , GCS 15 , AAOx3,  MOSCOSO.     Injuries identified:   - Left Forehead laceration s/p repair in ED  - Multiple acute displaced fx involving the C1 vertebra as well as a type 2 dens fracture     PLAN:   - Admit to surgery under Dr. Lambert   - SICU consult for SDU   - Neurosurgery: Please maintain hard collar at all times, no acute surgical intervention   - Monitor respiratory status   - Pain control   - Restarted home medications   - PT/Rehab     Above plan discussed with Trauma attending, Dr. Lambert, patient, patient family, and ED team  --------------------------------------------------------------------------------------  07-20-22 @ 17:39    TRAUMA SENIOR SPECTRA: [id number]  TRAUMA TEAM SPECTRA: [id number]

## 2022-07-20 NOTE — ED PROVIDER NOTE - PROGRESS NOTE DETAILS
JR: discussed plan with ROWAN Greene. Plan to transfer to trauma- T2 dens fx, mult fractures of C1 vertebra neuro surg aware of pt and transfer. JAN: Patient arrived at [facility], HD stable. Trauma and neurosurgery notified of patient's arrival. Jc: patient to be admitted to surgery under dr de leon

## 2022-07-20 NOTE — CONSULT NOTE ADULT - ATTENDING COMMENTS
Critical Care: [id number]   This patient has a high probability of sudden, clinically significant deterioration, which requires the highest level of physician preparedness to intervene urgently. I managed/supervised life or organ supporting interventions that required frequent physician assessment. I devoted my full attention in the ICU to the direct care of this patient for the period of time indicated below. Time I spent with the family or surrogate(s) is included only if the patient was incapable of providing the necessary information or participating in medical decision making. Time devoted to teaching and to any procedures I billed separately is not included.     CHAZ BCUHANAN 89y Female admitted to [ ] SICU /[x ] SDU with mechanical fall and C1-C2 fracture, no neurological deficit at this time, hemodynamic instability, airway at risk for obstruction. High risk for neurological decompensation    Patient is examined and evaluated at the bedside with SICU team. Treatment plan discussed with SICU team, nurses and primary team.   Chest X-ray and all relevant studies reviewed during rounds.  Will continue hemodynamic monitoring as per protocol in SICU.    Neuro:  GCS [15 ]   [x ]  Neurovascular checks as per SICU protocol                 [ ] 3% NaCl                     Paralysis [ ] Yes  [x ]  No      Sedated/Pain control with                 [ ] Dilaudid drip, [ ]  Ketamine, [ ] Fentanyl, [ ] Propofol, [ ] Precedex, [ ] Versed, [ ] Ativan,                           [ ] OxyContin standing,  [ ] OxyContin PRN, [ x] Dilaudid PRN pushes, [ ] Fentanyl PRN pushes, [ ] PCA,                [x ] Tylenol IV/PO, [x ] Gabapentin, [ ]  Ketorolac, [x ] Tramadol,  [ ] Lidoderm Patch       Other Medications               [ ] Seroquel, [ ] Zyprexa, [ ] Haldol, [ ] Zyprexa,  [ ] Clonazepam [ ] Xanax, [ ] Versed/Ativan PRN, [ ] Valium [x ] None               [ ] Robaxin   [ ] Baclofen  [ ] Flexeril               [ ] Keppra  [ ] Lamictal  [ ] Depakote  [ ] Dilantin               [ ] CIWA (Ativan/Valium/ Librium)  CV: continue to monitor  On pressors [ ]  Yes  [x ]  No          [ ]  Levophed, [ ] Lauri-Synephrine, [ ] Vasopressin, [ ]  Epinephrine          [ ] Dobutamine, [ ] Milrinone, [ ]  Midodrine,  [ ] Others    Other Cardiac Meds          [ ] Amiodarone IV/PO, [ ] Digoxin, [ ] Cardizem drip, [ ] Cleviprex drip, [ ] Esmolol drip  Respiratory: Acute respiratory insufficiency -> continue monitoring                        None Invasive Support  [x ] Incentive Spirometer                                  [ ] BiPAP   [ ] CPAP/NIV   [ ] HFNC   [ ] NR Face Mask   [x ] NC  [ ] Trach Caller                        Ventilatory support  [ ] Yes [x ] No                            [ ] SBT                                  [ ] PC    [ ] VC   [ ] AC   [ ] BiVent/APRV   [ ]CPAP   GI  [x ]  bowel regiment  [x ] BM none  [x ] Flatus +             [ ] Ostomy        Prophylaxis [x ] PPI  [ ] H2 Blockers  [ ] Others  Nutrition: continue   [x ] Diet NPO  [ ] TPN/PPN   [ ] calories count   [ ]  Tube Feeds     Renal: Continue I&Os monitoring, Lucero catheter  [ ] Yes,  [x ]   No ,  [ ] Consideration for discontinuation [ ] Taxes cath/PrimaFit  [ ] TOV       Lytes/Acid-base: replete hypokalemia, hypomagnesemia, hypocalcemia, hypophosphatemia        IV Fluids   [x ] LR@100ml/hr,  [ ] NS  [ ] D5W1/2NS [ ] Bicarbonate  [ ] Albumin   ID: leukocytosis -> continue to monitor:         IV Abx [ ] Yes, [x ] No;  ID consulted [ ] Yes, [x ] No        Cultures send  [ ] Respiratory   [ ] Blood   [ ] Urine  [ ] Fluids  [ ] Tissue  [x ]  None  Lines:   [ ] Right   [ ] Left  [ ] Bilateral                     [ ] Subclavian TLC        [ ]  Internal Jugular TLC     [ ]  Femoral TLC                     [ ] Subclavian Cordis    [ ] Internal Jugular Cordis   [ ] Femoral Cordis                     [ ] HD catheter    [ ] PICC     [ ]  Midline   [ x] Peripheral IVs                                                 [ ] Right   [ ] Left   [ x] None                     [ ] Radial A-Line    [ ] Femoral A-Line   [ ] Axillary A-Line               Heme: continue to evaluate for acute blood loss anemia- trend Hg/Hct                     AC Reversal Indicated [ ] Yes  [ x] No                    Transfused  indicated  [ ] Yes, [x ] No    [ ] PRBCs   [ ] Platelets   [ ] FFPs   [ ] Cryoprecipitate                    Should be started on or continued with  following  [ ] Yes,  [x ] No                               [ ] Lovenox  [ ] Coumadin  [ ] Heparin drip  [ ] NOVACs  [ ] ASA  [ ] Antiplatelets   Endocrine: Prevent and treat hyperglycemia with insulin as needed,                                 Insuline drip [ ] Yes, [x ] No   PV: follow pulse exam  Skin: decub precautions  DVT Prophylaxis:  [x ] SCDs  [x ] Heparin SQ  [ ] Lovenox  SQ  Stress Gastritis Prophylaxis: PPI/H2 Blockers if indicated  Mobility: patient is evaluated at the bedside with mobility team and the goals for today are discussed with PT [x ] bed rest with spine precaution     PATIENT/FAMILY/SURROGATE CONFERENCE:  [x ] Yes with patient at the bedside. [ ] No  PURPOSE: To obtain necessary information, To discuss treatment options under consideration today.    I saw and evaluated the patient personally. I have reviewed and agree with note above. Treatment plan discussed with SICU team, nurses and primary team at the time of the multidisciplinary rounds. The above note is NOT written at the time of rounds and will reflect all changes throughout management of the patient for the day note is written for.    Simin Cid MD, FACS  Trauma/ACS/SCC Attending

## 2022-07-20 NOTE — PATIENT PROFILE ADULT - FALL HARM RISK - HARM RISK INTERVENTIONS

## 2022-07-21 LAB
ANION GAP SERPL CALC-SCNC: 11 MMOL/L — SIGNIFICANT CHANGE UP (ref 7–14)
ANION GAP SERPL CALC-SCNC: 12 MMOL/L — SIGNIFICANT CHANGE UP (ref 7–14)
BLD GP AB SCN SERPL QL: SIGNIFICANT CHANGE UP
BUN SERPL-MCNC: 13 MG/DL — SIGNIFICANT CHANGE UP (ref 10–20)
BUN SERPL-MCNC: 14 MG/DL — SIGNIFICANT CHANGE UP (ref 10–20)
BUN SERPL-MCNC: 15 MG/DL — SIGNIFICANT CHANGE UP (ref 10–20)
BUN SERPL-MCNC: 23 MG/DL — HIGH (ref 10–20)
CALCIUM SERPL-MCNC: 9.1 MG/DL — SIGNIFICANT CHANGE UP (ref 8.5–10.1)
CALCIUM SERPL-MCNC: 9.3 MG/DL — SIGNIFICANT CHANGE UP (ref 8.5–10.1)
CALCIUM SERPL-MCNC: 9.7 MG/DL — SIGNIFICANT CHANGE UP (ref 8.5–10.1)
CALCIUM SERPL-MCNC: 9.9 MG/DL — SIGNIFICANT CHANGE UP (ref 8.5–10.1)
CHLORIDE SERPL-SCNC: 100 MMOL/L — SIGNIFICANT CHANGE UP (ref 98–110)
CHLORIDE SERPL-SCNC: 98 MMOL/L — SIGNIFICANT CHANGE UP (ref 98–110)
CO2 SERPL-SCNC: 26 MMOL/L — SIGNIFICANT CHANGE UP (ref 17–32)
CO2 SERPL-SCNC: 26 MMOL/L — SIGNIFICANT CHANGE UP (ref 17–32)
CO2 SERPL-SCNC: 27 MMOL/L — SIGNIFICANT CHANGE UP (ref 17–32)
CO2 SERPL-SCNC: 27 MMOL/L — SIGNIFICANT CHANGE UP (ref 17–32)
CREAT SERPL-MCNC: 0.5 MG/DL — LOW (ref 0.7–1.5)
CREAT SERPL-MCNC: 0.5 MG/DL — LOW (ref 0.7–1.5)
CREAT SERPL-MCNC: 0.7 MG/DL — SIGNIFICANT CHANGE UP (ref 0.7–1.5)
CREAT SERPL-MCNC: 0.7 MG/DL — SIGNIFICANT CHANGE UP (ref 0.7–1.5)
EGFR: 81 ML/MIN/1.73M2 — SIGNIFICANT CHANGE UP
EGFR: 81 ML/MIN/1.73M2 — SIGNIFICANT CHANGE UP
EGFR: 87 ML/MIN/1.73M2 — SIGNIFICANT CHANGE UP
EGFR: 87 ML/MIN/1.73M2 — SIGNIFICANT CHANGE UP
GLUCOSE BLDC GLUCOMTR-MCNC: 122 MG/DL — HIGH (ref 70–99)
GLUCOSE BLDC GLUCOMTR-MCNC: 124 MG/DL — HIGH (ref 70–99)
GLUCOSE BLDC GLUCOMTR-MCNC: 132 MG/DL — HIGH (ref 70–99)
GLUCOSE SERPL-MCNC: 107 MG/DL — HIGH (ref 70–99)
GLUCOSE SERPL-MCNC: 113 MG/DL — HIGH (ref 70–99)
GLUCOSE SERPL-MCNC: 210 MG/DL — HIGH (ref 70–99)
GLUCOSE SERPL-MCNC: 224 MG/DL — HIGH (ref 70–99)
HCT VFR BLD CALC: 39.4 % — SIGNIFICANT CHANGE UP (ref 37–47)
HCT VFR BLD CALC: 39.5 % — SIGNIFICANT CHANGE UP (ref 37–47)
HGB BLD-MCNC: 13.3 G/DL — SIGNIFICANT CHANGE UP (ref 12–16)
HGB BLD-MCNC: 13.4 G/DL — SIGNIFICANT CHANGE UP (ref 12–16)
MAGNESIUM SERPL-MCNC: 1.8 MG/DL — SIGNIFICANT CHANGE UP (ref 1.8–2.4)
MAGNESIUM SERPL-MCNC: 1.9 MG/DL — SIGNIFICANT CHANGE UP (ref 1.8–2.4)
MCHC RBC-ENTMCNC: 29.5 PG — SIGNIFICANT CHANGE UP (ref 27–31)
MCHC RBC-ENTMCNC: 29.8 PG — SIGNIFICANT CHANGE UP (ref 27–31)
MCHC RBC-ENTMCNC: 33.7 G/DL — SIGNIFICANT CHANGE UP (ref 32–37)
MCHC RBC-ENTMCNC: 34 G/DL — SIGNIFICANT CHANGE UP (ref 32–37)
MCV RBC AUTO: 87.6 FL — SIGNIFICANT CHANGE UP (ref 81–99)
MCV RBC AUTO: 87.8 FL — SIGNIFICANT CHANGE UP (ref 81–99)
NRBC # BLD: 0 /100 WBCS — SIGNIFICANT CHANGE UP (ref 0–0)
NRBC # BLD: 0 /100 WBCS — SIGNIFICANT CHANGE UP (ref 0–0)
PHOSPHATE SERPL-MCNC: 3.1 MG/DL — SIGNIFICANT CHANGE UP (ref 2.1–4.9)
PHOSPHATE SERPL-MCNC: 3.5 MG/DL — SIGNIFICANT CHANGE UP (ref 2.1–4.9)
PLATELET # BLD AUTO: 216 K/UL — SIGNIFICANT CHANGE UP (ref 130–400)
PLATELET # BLD AUTO: 242 K/UL — SIGNIFICANT CHANGE UP (ref 130–400)
POTASSIUM SERPL-MCNC: 3.1 MMOL/L — LOW (ref 3.5–5)
POTASSIUM SERPL-MCNC: 3.8 MMOL/L — SIGNIFICANT CHANGE UP (ref 3.5–5)
POTASSIUM SERPL-MCNC: 4.3 MMOL/L — SIGNIFICANT CHANGE UP (ref 3.5–5)
POTASSIUM SERPL-MCNC: 4.5 MMOL/L — SIGNIFICANT CHANGE UP (ref 3.5–5)
POTASSIUM SERPL-SCNC: 3.1 MMOL/L — LOW (ref 3.5–5)
POTASSIUM SERPL-SCNC: 3.8 MMOL/L — SIGNIFICANT CHANGE UP (ref 3.5–5)
POTASSIUM SERPL-SCNC: 4.3 MMOL/L — SIGNIFICANT CHANGE UP (ref 3.5–5)
POTASSIUM SERPL-SCNC: 4.5 MMOL/L — SIGNIFICANT CHANGE UP (ref 3.5–5)
RBC # BLD: 4.49 M/UL — SIGNIFICANT CHANGE UP (ref 4.2–5.4)
RBC # BLD: 4.51 M/UL — SIGNIFICANT CHANGE UP (ref 4.2–5.4)
RBC # FLD: 13.7 % — SIGNIFICANT CHANGE UP (ref 11.5–14.5)
RBC # FLD: 13.9 % — SIGNIFICANT CHANGE UP (ref 11.5–14.5)
SODIUM SERPL-SCNC: 136 MMOL/L — SIGNIFICANT CHANGE UP (ref 135–146)
SODIUM SERPL-SCNC: 137 MMOL/L — SIGNIFICANT CHANGE UP (ref 135–146)
SODIUM SERPL-SCNC: 137 MMOL/L — SIGNIFICANT CHANGE UP (ref 135–146)
SODIUM SERPL-SCNC: 139 MMOL/L — SIGNIFICANT CHANGE UP (ref 135–146)
WBC # BLD: 15.06 K/UL — HIGH (ref 4.8–10.8)
WBC # BLD: 15.64 K/UL — HIGH (ref 4.8–10.8)
WBC # FLD AUTO: 15.06 K/UL — HIGH (ref 4.8–10.8)
WBC # FLD AUTO: 15.64 K/UL — HIGH (ref 4.8–10.8)

## 2022-07-21 PROCEDURE — 93010 ELECTROCARDIOGRAM REPORT: CPT

## 2022-07-21 PROCEDURE — 99291 CRITICAL CARE FIRST HOUR: CPT | Mod: 24,25

## 2022-07-21 PROCEDURE — 71045 X-RAY EXAM CHEST 1 VIEW: CPT | Mod: 26

## 2022-07-21 RX ORDER — POTASSIUM CHLORIDE 20 MEQ
20 PACKET (EA) ORAL
Refills: 0 | Status: DISCONTINUED | OUTPATIENT
Start: 2022-07-21 | End: 2022-07-22

## 2022-07-21 RX ORDER — MAGNESIUM SULFATE 500 MG/ML
2 VIAL (ML) INJECTION ONCE
Refills: 0 | Status: COMPLETED | OUTPATIENT
Start: 2022-07-21 | End: 2022-07-21

## 2022-07-21 RX ORDER — HEPARIN SODIUM 5000 [USP'U]/ML
5000 INJECTION INTRAVENOUS; SUBCUTANEOUS EVERY 8 HOURS
Refills: 0 | Status: DISCONTINUED | OUTPATIENT
Start: 2022-07-21 | End: 2022-07-22

## 2022-07-21 RX ORDER — CHLORHEXIDINE GLUCONATE 213 G/1000ML
1 SOLUTION TOPICAL
Refills: 0 | Status: DISCONTINUED | OUTPATIENT
Start: 2022-07-21 | End: 2022-07-22

## 2022-07-21 RX ADMIN — CHLORHEXIDINE GLUCONATE 1 APPLICATION(S): 213 SOLUTION TOPICAL at 13:08

## 2022-07-21 RX ADMIN — HEPARIN SODIUM 5000 UNIT(S): 5000 INJECTION INTRAVENOUS; SUBCUTANEOUS at 13:08

## 2022-07-21 RX ADMIN — Medication 50 MILLIEQUIVALENT(S): at 06:23

## 2022-07-21 RX ADMIN — INSULIN HUMAN 10 UNIT(S): 100 INJECTION, SOLUTION SUBCUTANEOUS at 01:17

## 2022-07-21 RX ADMIN — Medication 50 MILLIEQUIVALENT(S): at 04:17

## 2022-07-21 RX ADMIN — LOSARTAN POTASSIUM 50 MILLIGRAM(S): 100 TABLET, FILM COATED ORAL at 05:50

## 2022-07-21 RX ADMIN — Medication 200 GRAM(S): at 01:17

## 2022-07-21 RX ADMIN — HEPARIN SODIUM 5000 UNIT(S): 5000 INJECTION INTRAVENOUS; SUBCUTANEOUS at 21:10

## 2022-07-21 RX ADMIN — Medication 50 MILLILITER(S): at 01:18

## 2022-07-21 NOTE — PROGRESS NOTE ADULT - SUBJECTIVE AND OBJECTIVE BOX
CHAZ BUCHANAN  [id number]  89y Female    Indication for ICU admission:    Admit Date:07-20-22  ICU Date: 7/20/22  OR Date:     penicillin (Eye Irritation)  sulfa drugs (Eye Irritation)    PAST MEDICAL & SURGICAL HISTORY:  Bronchiectasis    Aneurysm of aorta  Throacic    HTN (hypertension)    No significant past surgical history  Excisions of skin cancer on left arm      Home Medications:  alendronate 10 mg oral tablet: 1 tab(s) orally once a day (20 Jul 2022 14:38)  losartan 50 mg oral tablet: 1 tab(s) orally once a day (20 Jul 2022 14:38)        24HRS EVENT:  7/20  Night  -CTA neck: The vertebral arteries are patent, without evidence of   vascular occlusion, extravasation, or dissection. mild compression upon the right vertebral artery by the inferior corner of the lateral mass of C1, which is mildly displaced laterally  - k 5.6, treated w/ repeat 3.1 , repleted          DVT PTX: holding lovenox, SCDs    GI PTX: not indicated    ***Tubes/Lines/Drains  ***  - PIV      REVIEW OF SYSTEMS    [X] A ten-point review of systems was otherwise negative except as noted.  [ ] Due to altered mental status/intubation, subjective information were not able to be obtained from the patient. History was obtained, to the extent possible, from review of the chart and collateral sources of information.                   CHAZ BUCHANAN  [id number]  89y Female    Indication for ICU admission:    Admit Date:22  ICU Date: 22  OR Date:     penicillin (Eye Irritation)  sulfa drugs (Eye Irritation)    PAST MEDICAL & SURGICAL HISTORY:  Bronchiectasis    Aneurysm of aorta  Throacic    HTN (hypertension)    No significant past surgical history  Excisions of skin cancer on left arm      Home Medications:  alendronate 10 mg oral tablet: 1 tab(s) orally once a day (2022 14:38)  losartan 50 mg oral tablet: 1 tab(s) orally once a day (2022 14:38)        24HRS EVENT:    Night  -CTA neck: The vertebral arteries are patent, without evidence of   vascular occlusion, extravasation, or dissection. mild compression upon the right vertebral artery by the inferior corner of the lateral mass of C1, which is mildly displaced laterally  - k 5.6, treated w/ repeat 3.1 , repleted          DVT PTX: holding lovenox, SCDs    GI PTX: not indicated    ***Tubes/Lines/Drains  ***  - PIV      REVIEW OF SYSTEMS    [X] A ten-point review of systems was otherwise negative except as noted.  [ ] Due to altered mental status/intubation, subjective information were not able to be obtained from the patient. History was obtained, to the extent possible, from review of the chart and collateral sources of information.    Daily Height in cm: 160.02 (2022 10:45)    Daily Weight in k.7 (2022 21:00)    Diet, NPO:   Except Medications (22 @ 18:47)      CURRENT MEDS:  Neurologic Medications  acetaminophen     Tablet .. 650 milliGRAM(s) Oral every 6 hours PRN Mild Pain (1 - 3)    Respiratory Medications    Cardiovascular Medications  losartan 50 milliGRAM(s) Oral daily    Gastrointestinal Medications  lactated ringers. 1000 milliLiter(s) IV Continuous <Continuous>  potassium chloride  20 mEq/100 mL IVPB 20 milliEquivalent(s) IV Intermittent every 2 hours    Genitourinary Medications    Hematologic/Oncologic Medications    Antimicrobial/Immunologic Medications    Endocrine/Metabolic Medications    Topical/Other Medications      ICU Vital Signs Last 24 Hrs  T(C): 36.3 (2022 04:00), Max: 36.9 (2022 15:28)  T(F): 97.4 (2022 04:00), Max: 98.5 (2022 15:28)  HR: 80 (2022 04:00) (80 - 100)  BP: 152/74 (2022 04:00) (145/70 - 188/85)  BP(mean): 106 (2022 04:00) (106 - 122)  ABP: --  ABP(mean): --  RR: 18 (2022 04:00) (17 - 18)  SpO2: 98% (2022 04:00) (92% - 98%)    O2 Parameters below as of 2022 04:00  Patient On (Oxygen Delivery Method): nasal cannula  O2 Flow (L/min): 2          I&O's Summary    2022 07:01  -  2022 07:00  --------------------------------------------------------  IN: 640 mL / OUT: 240 mL / NET: 400 mL      I&O's Detail    2022 07:01  -  2022 07:00  --------------------------------------------------------  IN:    IV PiggyBack: 300 mL    Lactated Ringers: 340 mL  Total IN: 640 mL    OUT:    Voided (mL): 240 mL  Total OUT: 240 mL    Total NET: 400 mL          PHYSICAL EXAM:    General/Neuro  Deficits:                             alert & oriented x 3, no focal deficits, [place]  Pupils:    Lungs:      clear to auscultation, Normal expansion/effort.     Cardiovascular : S1, S2.  Regular rate and rhythm. No  Peripheral edema   Cardiac Rhythm: Normal Sinus Rhythm    GI: Abdomen soft, Non-tender, Non-distended.      Extremities: Extremities warm, pink, well-perfused. Palpable dp b/l    Derm: Good skin turgor, no skin breakdown.      :     voiding      CXR:     LABS:  CAPILLARY BLOOD GLUCOSE      POCT Blood Glucose.: 124 mg/dL (2022 06:55)  POCT Blood Glucose.: 132 mg/dL (2022 02:43)  POCT Blood Glucose.: 122 mg/dL (2022 01:12)                          13.4   15.64 )-----------( 216      ( 2022 01:57 )             39.4       07    139  |  100  |  14  ----------------------------<  113<H>  3.8   |  27  |  0.5<L>    Ca    9.7      2022 05:37  Phos  3.1       Mg     1.8         TPro  6.7  /  Alb  4.2  /  TBili  0.4  /  DBili  x   /  AST  18  /  ALT  16  /  AlkPhos  73

## 2022-07-21 NOTE — PROGRESS NOTE ADULT - ASSESSMENT
Assessment & Plan  89y Female s/p fall (+HT, -LOC, -AC) w/ C1 vertebra fx, type II dens fracture.    NEURO:  #C1 vertebra fracture, type II dens fracture  - Pain: Tylenol PRN  - [place] J collar in place  - Neurosurgery consult: no neurosurgical intervention, f/u w/ Dr. Pulido in 2 weeks outpatient  - f/u CT angio neck  - Q4 neuro checks  - CTA: The vertebral arteries are patent, without evidence of   vascular occlusion, extravasation, or dissection. mild compression upon the right vertebral artery by the inferior corner of the lateral mass of C1, which is mildly displaced laterally      RESP:  - monitor SpO2  - h/o bronchiectasis  - encourage IS    CARDS:   #HTN  - continue home losartan 50mg qd PO (last dose 7/20 AM)  - EKG: NSR; QTc 419      GI/NUTR:   - Diet: NPO except meds  - advance diet after CT angio neck    /RENAL:   - Monitor UOP  - IV fluids: LR @ 85 cc/hr  - IVL once start diet    Labs:          BUN/Cr- 19/0.6  --> 13/0.5          Electrolytes-Na 137 // K 3.1 // Mg 1.8 //  Phos 3.1 (07-21 @ 01:57)      HEME/ONC:   DVT prophylaxis-, SCDs    Labs: Hb/Hct:  14.1/42.5  --> 13.4/39.4                        Plts:  242  -->   216              PTT/INR:      - T&S ordered for PM  - f/u PM labs    ID:  WBC- 17.32  --->>15.64  Temp trend- 24hrs T(F): 98.5 (07-20 @ 15:28), Max: 98.5 (07-20 @ 15:28)         ENDO:  - Glucose, Serum: 132 (07-21 @ 02:43)  - no chronic issues  - FS q6h while NPO    LINES/DRAINS:  PIV    Advanced Directives: Presumed Full Code    HCP/Emergency Contact-    DISPO:    SICU (stepdown). Case discussed with Dr. Cid Assessment & Plan  89y Female s/p fall (+HT, -LOC, -AC) w/ C1 vertebra fx, type II dens fracture.    NEURO:  #C1 vertebra fracture, type II dens fracture  - Pain: Tylenol PRN  - [place] J collar in place  - Neurosurgery consult: no neurosurgical intervention, f/u w/ Dr. Pulido in 2 weeks outpatient  - Q4 neuro checks  - CTA: The vertebral arteries are patent, without evidence of   vascular occlusion, extravasation, or dissection. mild compression upon the right vertebral artery by the inferior corner of the lateral mass of C1, which is mildly displaced laterally      RESP:  - monitor SpO2  - h/o bronchiectasis  - encourage IS    CARDS:   #HTN  - continue home losartan 50mg qd PO (last dose 7/20 AM)  - EKG: NSR; QTc 419      GI/NUTR:   - Diet: Reg diet  - advance diet after CT angio neck    /RENAL:   - Monitor UOP  - IV fluids: LR @ 85 cc/hr, IVL once tolerating diet   - IVL once start diet    Labs:          BUN/Cr- 19/0.6  --> 13/0.5 > 14/.5          Electrolytes-Electrolytes-Na 139 // K 3.8 // Mg -- //  Phos -- 07-21 @ 05:37      HEME/ONC:   DVT prophylaxis-, SCDs    Labs: Hb/Hct:  14.1/42.5  --> 13.4/39.4                        Plts:  242  -->   216              PTT/INR:      - T&S ordered for PM    ID:  WBC- 17.32  --->>15.64   afebrile tmax 98.5         ENDO:  - Glucose, Serum: 132 (07-21 @ 02:43)  - no chronic issues  - FS q6h while NPO    LINES/DRAINS:  PIV    Advanced Directives: Presumed Full Code    HCP/Emergency Contact-    DISPO:    SICU (stepdown). Case discussed with Dr. Cid

## 2022-07-21 NOTE — CONSULT NOTE ADULT - SUBJECTIVE AND OBJECTIVE BOX
HPI:  89yF w/ PMHx of HTN, aortic aneurysm seen as Trauma Consult s/p blunt head trauma. Patient reports while taking her blood pressure medications she fell forward and hit her forehead on the counter. +HT, -LOC, -AC. Patient did not fall onto the ground and was able to ambulate afterwards. Patient reports obtaining a forehead laceration that was bleeding which brought her to the ED at [facility]. Laceration was repaired, imaging was done and patient was sent to [facility]. Patient reports having head pain and mild posterior neck pain that does not radiate. Denies CP, SOB, abdominal pain, extremity weakness/numbness. Trauma assessment in ED: ABCs intact , GCS 15 , AAOx3.  #C1 vertebra fracture, type II dens fracture  - Pain: Tylenol PRN  - [place] J collar in place  - Neurosurgery consult: no neurosurgical intervention, f/u w/ Dr. Pulido in 2 weeks outpatient  - Q4 neuro checks  - CTA: The vertebral arteries are patent, without evidence of   vascular occlusion, extravasation, or dissection. mild compression upon the right vertebral artery by the inferior corner of the lateral mass of C1, which is mildly displaced laterally      PAST MEDICAL & SURGICAL HISTORY:  Bronchiectasis      Aneurysm of aorta  Throacic      HTN (hypertension)      No significant past surgical history  Excisions of skin cancer on left arm          Hospital Course:    TODAY'S SUBJECTIVE & REVIEW OF SYMPTOMS:     Constitutional WNL   Cardio WNL   Resp WNL   GI WNL  Heme WNL  Endo WNL  Skin WNL  MSK neck pain  Neuro WNL  Cognitive WNL  Psych WNL      MEDICATIONS  (STANDING):  chlorhexidine 2% Cloths 1 Application(s) Topical <User Schedule>  heparin   Injectable 5000 Unit(s) SubCutaneous every 8 hours  lactated ringers. 1000 milliLiter(s) (85 mL/Hr) IV Continuous <Continuous>  losartan 50 milliGRAM(s) Oral daily  potassium chloride  20 mEq/100 mL IVPB 20 milliEquivalent(s) IV Intermittent every 2 hours    MEDICATIONS  (PRN):  acetaminophen     Tablet .. 650 milliGRAM(s) Oral every 6 hours PRN Mild Pain (1 - 3)      FAMILY HISTORY:      Allergies    penicillin (Eye Irritation)  sulfa drugs (Eye Irritation)    Intolerances        SOCIAL HISTORY:    [  ] Etoh  [  ] Smoking  [  ] Substance abuse     Home Environment:  [ x  ] Home Alone  [   ] Lives with Family  [   ] Home Health Aid    Dwelling:  [   ] Apartment  [ x  ] Private House  [   ] Adult Home  [   ] Skilled Nursing Facility      [   ] Short Term  [   ] Long Term  [ x  ] Stairs       Elevator [   ]    FUNCTIONAL STATUS PTA: (Check all that apply)  Ambulation: [ x   ]Independent    [   ] Dependent     [   ] Non-Ambulatory  Assistive Device: [  x ] SA Cane  [   ]  Q Cane  [   ] Walker  [   ]  Wheelchair  ADL : [ x  ] Independent  [    ]  Dependent       Vital Signs Last 24 Hrs  T(C): 36.4 (21 Jul 2022 15:30), Max: 36.5 (20 Jul 2022 21:00)  T(F): 97.5 (21 Jul 2022 15:30), Max: 97.7 (20 Jul 2022 21:00)  HR: 90 (21 Jul 2022 15:30) (78 - 98)  BP: 193/79 (21 Jul 2022 15:30) (148/78 - 193/79)  BP(mean): 105 (21 Jul 2022 12:00) (105 - 122)  RR: 18 (21 Jul 2022 15:30) (18 - 18)  SpO2: 95% (21 Jul 2022 12:00) (92% - 98%)    Parameters below as of 21 Jul 2022 15:30  Patient On (Oxygen Delivery Method): nasal cannula          PHYSICAL EXAM: Awake & Alert  GENERAL: NAD  HEAD:  Normocephalic, forehead laceration, s/p suture  CHEST/LUNG: Clear   HEART: S1S2+  ABDOMEN: Soft, Nontender  EXTREMITIES:  no calf tenderness    NERVOUS SYSTEM:  Cranial Nerves 2-12 intact [   ] Abnormal  [   ]  ROM: WFL all extremities [  x ]  Abnormal [   ]  Motor Strength: WFL all extremities  [ x  ]  Abnormal [   ]  Sensation: intact to light touch [ x  ] Abnormal [   ]    FUNCTIONAL STATUS:  Bed Mobility: Independent [   ]  Supervision [   ]  Needs Assistance [x   ]  N/A [   ]  Transfers: Independent [   ]  Supervision [   ]  Needs Assistance [x   ]  N/A [   ]   Ambulation: Independent [   ]  Supervision [   ]  Needs Assistance [   ]  N/A [   ]  ADL: Independent [   ] Requires Assistance [   ] N/A [   ]      LABS:                        13.4   15.64 )-----------( 216      ( 21 Jul 2022 01:57 )             39.4     07-21    136  |  98  |  15  ----------------------------<  224<H>  4.3   |  27  |  0.7    Ca    9.3      21 Jul 2022 12:50  Phos  3.1     07-21  Mg     1.8     07-21    TPro  6.7  /  Alb  4.2  /  TBili  0.4  /  DBili  x   /  AST  18  /  ALT  16  /  AlkPhos  73  07-20          RADIOLOGY & ADDITIONAL STUDIES:  
HPI: Patient is a 89 year-old female PMH HTN and aortic aneurysm who presented to the hospital s/p mechanical fall at home. Patient claims she was taking her blood pressure medication when she fell forward and hit her forehead on the counter. Neurosurgery was consulted after imaging demonstrated multiple fractures of C1 and type 2 dens fracture. Patient was seen and examined at bedside in ED. She is lying in bed with [place] collar in place A&Ox3 and following all commands. She admits to some neck pain but otherwise denies radiation of pain into lower extremities, weakness, paresthesias, headaches, difficulty ambulating at this time.        PAST MEDICAL & SURGICAL HISTORY:  Bronchiectasis      Aneurysm of aorta  Throacic      HTN (hypertension)      No significant past surgical history  Excisions of skin cancer on left arm          Home Medications:  alendronate 10 mg oral tablet: 1 tab(s) orally once a day (20 Jul 2022 14:38)  losartan 50 mg oral tablet: 1 tab(s) orally once a day (20 Jul 2022 14:38)      Allergies    penicillin (Eye Irritation)  sulfa drugs (Eye Irritation)    Intolerances        ROS:  [X] A ten-point review of systems is negative except as noted   [  ] Due to altered mental status/intubation, subjective information were not able to be obtained from the patient. History was obtained, to the extent possible, from review of the chart and collateral sources of information    MEDICATIONS  (STANDING):    MEDICATIONS  (PRN):      ICU Vital Signs Last 24 Hrs  T(C): 36.9 (20 Jul 2022 15:28), Max: 36.9 (20 Jul 2022 15:28)  T(F): 98.5 (20 Jul 2022 15:28), Max: 98.5 (20 Jul 2022 15:28)  HR: 88 (20 Jul 2022 15:28) (88 - 100)  BP: 157/60 (20 Jul 2022 15:28) (145/70 - 157/60)  BP(mean): --  ABP: --  ABP(mean): --  RR: 17 (20 Jul 2022 15:28) (17 - 18)  SpO2: 95% (20 Jul 2022 15:28) (95% - 95%)    O2 Parameters below as of 20 Jul 2022 15:28  Patient On (Oxygen Delivery Method): room air            I&O's Detail      CBC Full  -  ( 20 Jul 2022 14:02 )  WBC Count : 17.32 K/uL  RBC Count : 4.75 M/uL  Hemoglobin : 14.1 g/dL  Hematocrit : 42.5 %  Platelet Count - Automated : 242 K/uL  Mean Cell Volume : 89.5 fL  Mean Cell Hemoglobin : 29.7 pg  Mean Cell Hemoglobin Concentration : 33.2 g/dL  Auto Neutrophil # : 16.01 K/uL  Auto Lymphocyte # : 0.44 K/uL  Auto Monocyte # : 0.76 K/uL  Auto Eosinophil # : 0.00 K/uL  Auto Basophil # : 0.03 K/uL  Auto Neutrophil % : 92.4 %  Auto Lymphocyte % : 2.5 %  Auto Monocyte % : 4.4 %  Auto Eosinophil % : 0.0 %  Auto Basophil % : 0.2 %    07-20    138  |  100  |  19  ----------------------------<  137<H>  4.2   |  26  |  0.6<L>    Ca    9.6      20 Jul 2022 14:02    TPro  6.7  /  Alb  4.2  /  TBili  0.4  /  DBili  x   /  AST  18  /  ALT  16  /  AlkPhos  73  07-20            Physical Exam:  General: Lying in bed, hard collar in place, following all commands  AAOX3. Verbal function intact  Facial motions symmetric  EOMI  Motor: MAEx4  5/5 strength in b/l UE's and LE's  Hoffmans: Negative b/l   Sensation: intact to touch in all extremities      Imaging:  < from: CT Cervical Spine No Cont (07.20.22 @ 12:58) >  IMPRESSION:    CT HEAD:  Midline supraorbital scalp hematoma without underlying calvarial fracture   or intracranial hemorrhage.    Mild chronic microvascular type changes.    Unchanged appearance of a small partially calcified extra-axial mass   overlying the right frontal lobe possibly representing a meningioma.    CT CERVICAL SPINE:  Multiple acute displaced fractures involving the C1 vertebra as well as a   type II dens fracture with minimal posterior angulation of the superior   fracture fragment.        Assessment/Plan:  89 year-old female pmh HTN presenting s/p fall at home. CT demonstrating multiple displaced fractures involving C1 and type 2 dens fracture  -CTA H&N   -Please maintain hard collar at all times, may provide pt with Ck LIMA  -Pain management  -PT/OT/Rehab  -No neurosurgical intervention  -Patient may follow up in office in 2 weeks with Dr. Pulido  -D/w attending
SICU Consultation Note  =====================================================  HPI:  Patient is a 89 y/o female PMH HTN, thoracic aneurysm (stable as per patient), bronchiectasis, and meningioma, PSH of cataract surgery who presented to ED as a trauma alert, transfer from [facility] who was taking her BP med when she lost her balance and fell forward and hit her head on the kitchen counter top (+HT, -AC, -LOC). Pt denies falling on floor, feeling lightheaded, dizzy, chest pain, or dyspnea at the time of event. Pt was able to ambulate after episode. Pt denies lightheadedness, dizziness, syncope, changes in vision or hearing, numbness, tingling, weakness, chest pain, dyspnea, abdominal pain, nausea, vomiting, extremity pain.    FRAIL Scale score of 0.    CT C-spine/Head demonstrated:  - acute displaced fracture of C1 vertebra  - Type II dens fracture w/ minimal angulation of superior fracture fragment  - midline supraorbital scalp hematoma    - f/u CXR and pelvis xray  - f/u CT angio      PAST MEDICAL & SURGICAL HISTORY:  Bronchiectasis    Aneurysm of aorta  Thoracic    HTN (hypertension)    No significant past surgical history  Excisions of skin cancer on left arm          Allergies:  penicillin (Eye Irritation)  sulfa drugs (Eye Irritation)      SH:  Home Medications:  alendronate 10 mg oral tablet: 1 tab(s) orally once a day (20 Jul 2022 14:38)  losartan 50 mg oral tablet: 1 tab(s) orally once a day (20 Jul 2022 14:38)    Advanced Directives: Full Code     ROS:  [X] A ten-point review of systems was otherwise negative except as noted.  [ ] Due to altered mental status/intubation, subjective information were not able to be obtained from the patient. History was obtained, to the extent possible, from review of the chart and collateral sources of information.      CURRENT MEDICATIONS:   --------------------------------------------------------------------------------------  Neurologic Medications    Respiratory Medications    Cardiovascular Medications    Gastrointestinal Medications  lactated ringers. 1000 milliLiter(s) IV Continuous <Continuous>    Genitourinary Medications    Hematologic/Oncologic Medications    Antimicrobial/Immunologic Medications    Endocrine/Metabolic Medications    Topical/Other Medications    --------------------------------------------------------------------------------------    Vital Signs Last 24 Hrs  T(C): 36.9 (20 Jul 2022 15:28), Max: 36.9 (20 Jul 2022 15:28)  T(F): 98.5 (20 Jul 2022 15:28), Max: 98.5 (20 Jul 2022 15:28)  HR: 88 (20 Jul 2022 15:28) (88 - 100)  BP: 157/60 (20 Jul 2022 15:28) (145/70 - 157/60)  BP(mean): --  RR: 17 (20 Jul 2022 15:28) (17 - 18)  SpO2: 95% (20 Jul 2022 15:28) (95% - 95%)    Parameters below as of 20 Jul 2022 15:28  Patient On (Oxygen Delivery Method): room air      I&O's Detail    I&O's Summary      LABS:                          14.1   17.32 )-----------( 242      ( 20 Jul 2022 14:02 )             42.5     07-20    138  |  100  |  19  ----------------------------<  137<H>  4.2   |  26  |  0.6<L>    Ca    9.6      20 Jul 2022 14:02    TPro  6.7  /  Alb  4.2  /  TBili  0.4  /  DBili  x   /  AST  18  /  ALT  16  /  AlkPhos  73  07-20    LIVER FUNCTIONS - ( 20 Jul 2022 14:02 )  Alb: 4.2 g/dL / Pro: 6.7 g/dL / ALK PHOS: 73 U/L / ALT: 16 U/L / AST: 18 U/L / GGT: x                   EXAM:  General/Neuro  GCS: 15  Exam: Normal, NAD, alert, oriented x 3, no focal deficits. PERRLA bilaterally. Motor sensory function intact in upper and lower bilateral. Strength 5/5 in upper and lower extremities. No facial asymmetry noted. Cranial nerves intact bilaterally. EOM intact.  Swelling noted midline forehead, laceration (approximately 4cm) closed with sutures and covered with steri strips.    Respiratory  Exam: Lungs clear to auscultation bilaterally, Normal expansion/effort.    Cardiovascular  Exam: S1, S2.  Regular rate and rhythm.   Cardiac Rhythm: Normal Sinus Rhythm  EKG: NSR; QTc 419    GI  Exam: Abdomen soft, Non-tender, Non-distended. Bowel sounds present in all 4 quadrants  Current Diet:  NPO except meds  - advance diet after CT angio neck    Extremities  Exam: Extremities warm, pink, and dry. Palpable bilateral PT pulses. Compartments soft and non-tender.    Derm:  Exam: Good skin turgor, no skin breakdown.        Tubes/Lines/Drains  ***  - PIVs

## 2022-07-21 NOTE — PROGRESS NOTE ADULT - ASSESSMENT
89y Female s/p fall (+HT, -LOC, -AC) w/ C1 vertebra fx, type II dens fracture.    Plan:  -Care per SICU team  -No NSX intervention  -Hard Collar at all times  -Pain control  -Strict I&O's  -Monitor electrolytes and replete    [id number]

## 2022-07-21 NOTE — PROGRESS NOTE ADULT - SUBJECTIVE AND OBJECTIVE BOX
TRAUMA SURGERY PROGRESS NOTE    Patient: CHAZ BUCHANAN , 89y ([birth year])Female   MRN: [id number]  Location: [address] 007   Visit: 07-20-22 Inpatient  Date: 07-21-22 @ 01:12    Hospital Day #: 1    Procedure/Dx/Injuries: acute displaced fracture of C1, type II dens fracture w/minimal angulation of superior fracture fragment    Events of past 24 hours: Patient was admitted to the SDU after fall. Trauma workup revealed fractured C1 and type II dens fracture. Seen by neurosurgery.     PAST MEDICAL & SURGICAL HISTORY:  Bronchiectasis      Aneurysm of aorta  Throacic      HTN (hypertension)      No significant past surgical history  Excisions of skin cancer on left arm          Vitals:   T(F): 97 (07-20-22 @ 20:00), Max: 98.5 (07-20-22 @ 15:28)  HR: 86 (07-20-22 @ 20:00)  BP: 164/81 (07-20-22 @ 20:00)  RR: 18 (07-20-22 @ 20:00)  SpO2: 97% (07-20-22 @ 20:00)      Diet, NPO:   Except Medications      Fluids: lactated ringers.: Solution, 1000 milliLiter(s) infuse at 85 mL/Hr      Physical Exam:  General/Neuro  GCS: 15  Exam: Normal, NAD, alert, oriented x 3, no focal deficits. PERRLA bilaterally. Motor sensory function intact in upper and lower bilateral. Strength 5/5 in upper and lower extremities. No facial asymmetry noted. Cranial nerves intact bilaterally. EOM intact.  Swelling noted midline forehead, laceration (approximately 4cm) closed with sutures and covered with steri strips.    Respiratory  Exam: Lungs clear to auscultation bilaterally, Normal expansion/effort.    Cardiovascular  Exam: S1, S2.  Regular rate and rhythm.   Cardiac Rhythm: Normal Sinus Rhythm  EKG: NSR; QTc 419    GI  Exam: Abdomen soft, Non-tender, Non-distended. Bowel sounds present in all 4 quadrants  Current Diet:  NPO except meds  - advance diet after CT angio neck    Extremities  Exam: Extremities warm, pink, and dry. Palpable bilateral PT pulses. Compartments soft and non-tender.    Derm:  Exam: Good skin turgor, no skin breakdown.      MEDICATIONS  (STANDING):  calcium gluconate IVPB 2 Gram(s) IV Intermittent once  dextrose 50% Injectable 50 milliLiter(s) IV Push once  insulin regular  human recombinant 10 Unit(s) IV Push once  lactated ringers. 1000 milliLiter(s) (85 mL/Hr) IV Continuous <Continuous>  losartan 50 milliGRAM(s) Oral daily    MEDICATIONS  (PRN):  acetaminophen     Tablet .. 650 milliGRAM(s) Oral every 6 hours PRN Mild Pain (1 - 3)      DVT PROPHYLAXIS: SCDs,   GI PROPHYLAXIS:   ANTICOAGULATION:   ANTIBIOTICS:       LAB/STUDIES:  Labs:  CAPILLARY BLOOD GLUCOSE                          13.9   18.83 )-----------( 231      ( 20 Jul 2022 21:56 )             40.5       Auto Neutrophil %: 92.4 % (07-20-22 @ 14:02)  Auto Immature Granulocyte %: 0.5 % (07-20-22 @ 14:02)  Band Neutrophils %: 2.0 % (07-20-22 @ 14:02)    07-20    136  |  98  |  16  ----------------------------<  125<H>  5.6<H>   |  23  |  0.6<L>      Calcium, Total Serum: 9.4 mg/dL (07-20-22 @ 21:56)      LFTs:             6.7  | 0.4  | 18       ------------------[73      ( 20 Jul 2022 14:02 )  4.2  | x    | 16            IMAGING:  < from: CT Head No Cont (07.20.22 @ 12:58) >  CT HEAD:  Midline supraorbital scalp hematoma without underlying calvarial fracture   or intracranial hemorrhage.    Mild chronic microvascular type changes.    Unchanged appearance of a small partially calcified extra-axial mass   overlying the right frontal lobe possibly representing a meningioma.    CT CERVICAL SPINE:  Multiple acute displaced fractures involving the C1 vertebra as well as a   type II dens fracture with minimal posterior angulation of the superior   fracture fragment.    < end of copied text >    < from: Xray Pelvis AP only (07.20.22 @ 17:38) >  No evidence of acute displaced fracture. Degenerative changes of the   spine and hips.    < end of copied text >    < from: CT Angio Neck w/ IV Cont (07.20.22 @ 21:16) >  1. The vertebral arteries are codominant and patent, without evidence of   vascular occlusion, extravasation, or dissection. On coronal image   (602/127) there is mild compression upon the right vertebral artery by   the inferior corner of the lateral mass of C1,which is mildly displaced   laterally.  2. Multiple acute displaced fractures involving the C1 vertebrae and type   II dens fracture with minimal displacement. Findings better detailed on   CT cervical spine performed same day, earlier.  3. Bilateraltree-in-bud nodularity, overall decreased in size from   11/14/2020. Recommend continued imaging for follow-up to resolution.    < end of copied text >      ACCESS DEVICES:  [x] Peripheral IV  [ ] Central Venous Line	[ ] R	[ ] L	[ ] IJ	[ ] Fem	[ ] SC	Placed:   [ ] Arterial Line		[ ] R	[ ] L	[ ] Fem	[ ] Rad	[ ] Ax	Placed:   [ ] PICC:					[ ] Mediport  [ ] Urinary Catheter,  Date Placed:   [ ] Chest tube: [ ] Right, [ ] Left  [ ] MELITON/Pierce Drains

## 2022-07-21 NOTE — CHART NOTE - NSCHARTNOTEFT_GEN_A_CORE
89y Female s/p fall (+HT, -LOC, -AC) w/ C1 vertebra fx, type II dens fracture.    NEURO:  #C1 vertebra fracture, type II dens fracture  - Pain: Tylenol PRN  - [place] J collar in place  - Neurosurgery consult: no neurosurgical intervention, f/u w/ Dr. Pulido in 2 weeks outpatient  - Q4 neuro checks  - CTA: The vertebral arteries are patent, without evidence of vascular occlusion, extravasation, or dissection. mild compression upon the right vertebral artery by the inferior corner of the lateral mass of C1, which is mildly displaced laterally      RESP:  - monitor SpO2  - h/o bronchiectasis  - encourage IS    CARDS:   #HTN  - continue home losartan 50mg qd PO  - EKG: NSR; QTc 419      GI/NUTR:   - Diet: passed bedside speech assessment, Reg diet  - GI PPX- no indication    /RENAL:   - voiding   - IV fluids: LR @ 85 cc/hr, IVL once tolerating diet     Labs:          BUN/Cr- 19/0.6  --> 13/0.5 > 14/.5          Electrolytes-Electrolytes-Na 139 // K 3.8 // Mg -- //  Phos -- 07-21 @ 05:37      HEME/ONC:   DVT prophylaxis-started HSQ , SCDs    Labs: Hb/Hct:  14.1/42.5  --> 13.4/39.4                        Plts:  242  -->   216              PTT/INR:      - T&S ordered for PM    ID:  WBC- 17.32  --->>15.64   afebrile tmax 98.5         ENDO:  - Glucose, Serum: 132 (07-21 @ 02:43)  - no chronic issues  - FS q6h while NPO    LINES/DRAINS:  PIV    Advanced Directives: Presumed Full Code    HCP/Emergency Contact-    f/u  -IVL once tolerating diet  -pm labs    full sign out given to primary team Dr. Kline @12:30pm

## 2022-07-21 NOTE — CONSULT NOTE ADULT - CONSULT REASON
C1/C2 fractures
fall
Q4 neuro checks in the setting of acute displaced C1 vertebra fracture and type II dens fx

## 2022-07-21 NOTE — CONSULT NOTE ADULT - ASSESSMENT
Assessment & Plan  89y Female s/p fall (+HT, -LOC, -AC) w/ C1 vertebra fx, type II dens fracture.    NEURO:  #C1 vertebra fracture, type II dens fracture  - Pain: Tylenol PRN  - [place] J collar in place  - Neurosurgery consult: no neurosurgical intervention, f/u w/ Dr. Pulido in 2 weeks outpatient  - f/u CT angio neck  - Q4 neuro checks      RESP:  - monitor SpO2  - h/o bronchiectasis  - encourage IS    CARDS:   #HTN  - continue home losartan 50mg qd PO (last dose 7/20 AM)  - EKG: NSR; QTc 419      GI/NUTR:   - Diet: NPO except meds  - advance diet after CT angio neck    /RENAL:   - Monitor UOP  - IV fluids: LR @ 85 cc/hr  - IVL once start diet    Labs:          BUN/Cr- 19/0.6  -->          Electrolytes-Na 138 // K 4.2 // Mg -- //  Phos -- (07-20 @ 14:02)  - f/u PM labs      HEME/ONC:   DVT prophylaxis-, SCDs    Labs: Hb/Hct:  14.1/42.5  -->                      Plts:  242  -->                 PTT/INR:      - T&S ordered for PM  - f/u PM labs    ID:  WBC- 17.32  --->>  Temp trend- 24hrs T(F): 98.5 (07-20 @ 15:28), Max: 98.5 (07-20 @ 15:28)         ENDO:  - Glucose, Serum: 137 (07-20 @ 14:02)  - no chronic issues  - FS q6h while NPO    LINES/DRAINS:  PIV    Advanced Directives: Presumed Full Code    HCP/Emergency Contact-    DISPO:    SICU (stepdown). Case discussed with Dr. Cid
Pt's wife Chyna returning phone call regarding pt's sleep apnea test results.    Please return phone call and advise.    Ok to leave a detailed message with any information.    
IMPRESSION: Rehab of multitrauma / s/p fall - C1 and C2 dens fx                                                                    - forehead laceration and scalp hematoma    PRECAUTIONS: [   ] Cardiac  [   ] Respiratory  [   ] Seizures [   ] Contact Isolation  [   ] Droplet Isolation  [   ] Other    Weight Bearing Status:     RECOMMENDATION:    Out of Bed to Chair     DVT/Decubiti Prophylaxis    REHAB PLAN:     [  x  ] Bedside P/T 3-5 times a week   [ x   ]   Bedside O/T  2-3 times a week             [    ] Speech Therapy               [    ]  No Rehab Therapy Indicated   Conditioning/ROM                                    ADL  Bed Mobility                                               Conditioning/ROM  Transfers                                                     Bed Mobility  Sitting /Standing Balance                         Transfers                                        Gait Training                                               Sitting/Standing Balance  Stair Training [   ]Applicable                    Home equipment Eval                                                                        Splinting  [   ] Only      GOALS:   ADL   [ x   ]   Independent                    Transfers  [ x   ] Independent                          Ambulation  [  x  ] Independent     [  x   ] With device                            [    ]  CG                                                         [    ]  CG                                                                  [    ] CG                            [    ] Min A                                                   [    ] Min A                                                              [    ] Min  A          DISCHARGE PLAN:   [    ]  Good candidate for Intensive Rehabilitation/Hospital based                                             Will tolerate 3hrs Intensive Rehab Daily                                       [     ]  Short Term Rehab in Skilled Nursing Facility                                       [     ]  Home with Outpatient or  services                                         [ x    ]  Possible Candidate for Intensive Hospital based Rehab

## 2022-07-22 ENCOUNTER — TRANSCRIPTION ENCOUNTER (OUTPATIENT)
Age: 87
End: 2022-07-22

## 2022-07-22 ENCOUNTER — INPATIENT (INPATIENT)
Facility: HOSPITAL | Age: 87
LOS: 10 days | Discharge: HOME | End: 2022-08-02
Attending: PHYSICAL MEDICINE & REHABILITATION | Admitting: PHYSICAL MEDICINE & REHABILITATION

## 2022-07-22 VITALS
HEART RATE: 92 BPM | DIASTOLIC BLOOD PRESSURE: 75 MMHG | OXYGEN SATURATION: 98 % | TEMPERATURE: 99 F | SYSTOLIC BLOOD PRESSURE: 137 MMHG

## 2022-07-22 VITALS
HEIGHT: 62 IN | OXYGEN SATURATION: 94 % | SYSTOLIC BLOOD PRESSURE: 158 MMHG | RESPIRATION RATE: 18 BRPM | TEMPERATURE: 98 F | HEART RATE: 82 BPM | WEIGHT: 106.48 LBS | DIASTOLIC BLOOD PRESSURE: 79 MMHG

## 2022-07-22 LAB
ANION GAP SERPL CALC-SCNC: 12 MMOL/L — SIGNIFICANT CHANGE UP (ref 7–14)
APTT BLD: 28 SEC — SIGNIFICANT CHANGE UP (ref 27–39.2)
BUN SERPL-MCNC: 23 MG/DL — HIGH (ref 10–20)
CALCIUM SERPL-MCNC: 9.4 MG/DL — SIGNIFICANT CHANGE UP (ref 8.5–10.1)
CHLORIDE SERPL-SCNC: 100 MMOL/L — SIGNIFICANT CHANGE UP (ref 98–110)
CK MB CFR SERPL CALC: 4.1 NG/ML — SIGNIFICANT CHANGE UP (ref 0.6–6.3)
CK SERPL-CCNC: 52 U/L — SIGNIFICANT CHANGE UP (ref 0–225)
CO2 SERPL-SCNC: 28 MMOL/L — SIGNIFICANT CHANGE UP (ref 17–32)
CREAT SERPL-MCNC: 0.6 MG/DL — LOW (ref 0.7–1.5)
EGFR: 84 ML/MIN/1.73M2 — SIGNIFICANT CHANGE UP
GLUCOSE SERPL-MCNC: 233 MG/DL — HIGH (ref 70–99)
HCT VFR BLD CALC: 43.8 % — SIGNIFICANT CHANGE UP (ref 37–47)
HGB BLD-MCNC: 14.6 G/DL — SIGNIFICANT CHANGE UP (ref 12–16)
INR BLD: 1.06 RATIO — SIGNIFICANT CHANGE UP (ref 0.65–1.3)
MAGNESIUM SERPL-MCNC: 2.1 MG/DL — SIGNIFICANT CHANGE UP (ref 1.8–2.4)
MCHC RBC-ENTMCNC: 29.9 PG — SIGNIFICANT CHANGE UP (ref 27–31)
MCHC RBC-ENTMCNC: 33.3 G/DL — SIGNIFICANT CHANGE UP (ref 32–37)
MCV RBC AUTO: 89.8 FL — SIGNIFICANT CHANGE UP (ref 81–99)
NRBC # BLD: 0 /100 WBCS — SIGNIFICANT CHANGE UP (ref 0–0)
PHOSPHATE SERPL-MCNC: 3.7 MG/DL — SIGNIFICANT CHANGE UP (ref 2.1–4.9)
PLATELET # BLD AUTO: 283 K/UL — SIGNIFICANT CHANGE UP (ref 130–400)
POTASSIUM SERPL-MCNC: 4.6 MMOL/L — SIGNIFICANT CHANGE UP (ref 3.5–5)
POTASSIUM SERPL-SCNC: 4.6 MMOL/L — SIGNIFICANT CHANGE UP (ref 3.5–5)
PROTHROM AB SERPL-ACNC: 12.2 SEC — SIGNIFICANT CHANGE UP (ref 9.95–12.87)
RBC # BLD: 4.88 M/UL — SIGNIFICANT CHANGE UP (ref 4.2–5.4)
RBC # FLD: 14.1 % — SIGNIFICANT CHANGE UP (ref 11.5–14.5)
SODIUM SERPL-SCNC: 140 MMOL/L — SIGNIFICANT CHANGE UP (ref 135–146)
TROPONIN T SERPL-MCNC: <0.01 NG/ML — SIGNIFICANT CHANGE UP
WBC # BLD: 15.26 K/UL — HIGH (ref 4.8–10.8)
WBC # FLD AUTO: 15.26 K/UL — HIGH (ref 4.8–10.8)

## 2022-07-22 PROCEDURE — 71045 X-RAY EXAM CHEST 1 VIEW: CPT | Mod: 26

## 2022-07-22 PROCEDURE — 93010 ELECTROCARDIOGRAM REPORT: CPT

## 2022-07-22 PROCEDURE — 99238 HOSP IP/OBS DSCHRG MGMT 30/<: CPT | Mod: GC

## 2022-07-22 PROCEDURE — 99233 SBSQ HOSP IP/OBS HIGH 50: CPT | Mod: 24,25

## 2022-07-22 RX ORDER — METOPROLOL TARTRATE 50 MG
5 TABLET ORAL ONCE
Refills: 0 | Status: COMPLETED | OUTPATIENT
Start: 2022-07-22 | End: 2022-07-22

## 2022-07-22 RX ORDER — LOSARTAN POTASSIUM 100 MG/1
50 TABLET, FILM COATED ORAL DAILY
Refills: 0 | Status: DISCONTINUED | OUTPATIENT
Start: 2022-07-22 | End: 2022-08-02

## 2022-07-22 RX ORDER — ACETAMINOPHEN 500 MG
650 TABLET ORAL EVERY 6 HOURS
Refills: 0 | Status: DISCONTINUED | OUTPATIENT
Start: 2022-07-22 | End: 2022-08-02

## 2022-07-22 RX ORDER — SENNA PLUS 8.6 MG/1
2 TABLET ORAL AT BEDTIME
Refills: 0 | Status: DISCONTINUED | OUTPATIENT
Start: 2022-07-22 | End: 2022-08-02

## 2022-07-22 RX ORDER — LANOLIN ALCOHOL/MO/W.PET/CERES
3 CREAM (GRAM) TOPICAL AT BEDTIME
Refills: 0 | Status: DISCONTINUED | OUTPATIENT
Start: 2022-07-22 | End: 2022-08-02

## 2022-07-22 RX ORDER — ACETAMINOPHEN 500 MG
2 TABLET ORAL
Qty: 0 | Refills: 0 | DISCHARGE
Start: 2022-07-22

## 2022-07-22 RX ORDER — SENNA PLUS 8.6 MG/1
2 TABLET ORAL AT BEDTIME
Refills: 0 | Status: DISCONTINUED | OUTPATIENT
Start: 2022-07-22 | End: 2022-07-22

## 2022-07-22 RX ORDER — HEPARIN SODIUM 5000 [USP'U]/ML
5000 INJECTION INTRAVENOUS; SUBCUTANEOUS EVERY 8 HOURS
Refills: 0 | Status: DISCONTINUED | OUTPATIENT
Start: 2022-07-22 | End: 2022-08-02

## 2022-07-22 RX ADMIN — HEPARIN SODIUM 5000 UNIT(S): 5000 INJECTION INTRAVENOUS; SUBCUTANEOUS at 23:57

## 2022-07-22 RX ADMIN — LOSARTAN POTASSIUM 50 MILLIGRAM(S): 100 TABLET, FILM COATED ORAL at 05:03

## 2022-07-22 RX ADMIN — SENNA PLUS 2 TABLET(S): 8.6 TABLET ORAL at 15:12

## 2022-07-22 RX ADMIN — Medication 650 MILLIGRAM(S): at 16:16

## 2022-07-22 RX ADMIN — Medication 25 GRAM(S): at 00:02

## 2022-07-22 RX ADMIN — Medication 5 MILLIGRAM(S): at 06:58

## 2022-07-22 RX ADMIN — HEPARIN SODIUM 5000 UNIT(S): 5000 INJECTION INTRAVENOUS; SUBCUTANEOUS at 15:12

## 2022-07-22 RX ADMIN — HEPARIN SODIUM 5000 UNIT(S): 5000 INJECTION INTRAVENOUS; SUBCUTANEOUS at 05:03

## 2022-07-22 NOTE — PROGRESS NOTE ADULT - SUBJECTIVE AND OBJECTIVE BOX
CHAZ BUCHANAN  [id number]  89y Female    Indication for ICU admission:    Admit Date:07-20-22  ICU Date: 7/20/22  OR Date:     penicillin (Eye Irritation)  sulfa drugs (Eye Irritation)    PAST MEDICAL & SURGICAL HISTORY:  Bronchiectasis    Aneurysm of aorta  Throacic    HTN (hypertension)    No significant past surgical history  Excisions of skin cancer on left arm      Home Medications:  alendronate 10 mg oral tablet: 1 tab(s) orally once a day (20 Jul 2022 14:38)  losartan 50 mg oral tablet: 1 tab(s) orally once a day (20 Jul 2022 14:38)    24HRS EVENT:  7/21  Night  - voiding  - pain controlled  - dg, waiting for bed     Day   - PT  - fu neurosurg re: hep sq vs lovenox  - start diet   - per cards, give vitamin k    DVT PTX: HSQ, SCDs    GI PTX: not indicated    ***Tubes/Lines/Drains  ***  - PIV      REVIEW OF SYSTEMS    [X] A ten-point review of systems was otherwise negative except as noted.  [ ] Due to altered mental status/intubation, subjective information were not able to be obtained from the patient. History was obtained, to the extent possible, from review of the chart and collateral sources of information.             CHAZ BUCHANAN  [id number]  89y Female    Indication for ICU admission:    Admit Date:07-20-22  ICU Date: 7/20/22  OR Date:     penicillin (Eye Irritation)  sulfa drugs (Eye Irritation)    PAST MEDICAL & SURGICAL HISTORY:  Bronchiectasis    Aneurysm of aorta  Throacic    HTN (hypertension)    No significant past surgical history  Excisions of skin cancer on left arm      Home Medications:  alendronate 10 mg oral tablet: 1 tab(s) orally once a day (20 Jul 2022 14:38)  losartan 50 mg oral tablet: 1 tab(s) orally once a day (20 Jul 2022 14:38)    24HRS EVENT:  7/21  Night  - voiding  - pain controlled  - dg, waiting for bed   - This AM transient episode of afib w/ RVR to 118, resolved with Lopressor. EKG now Normal Sinus Rhythm. SICU attending aware.     Day   - PT  - fu neurosurg re: hep sq vs lovenox  - start diet   - per cards, give vitamin k    DVT PTX: HSQ, SCDs    GI PTX: not indicated    ***Tubes/Lines/Drains  ***  - PIV      REVIEW OF SYSTEMS    [X] A ten-point review of systems was otherwise negative except as noted.  [ ] Due to altered mental status/intubation, subjective information were not able to be obtained from the patient. History was obtained, to the extent possible, from review of the chart and collateral sources of information.          Daily     Daily     Diet, DASH/TLC:   Sodium & Cholesterol Restricted (07-21-22 @ 11:03)      CURRENT MEDS:  Neurologic Medications  acetaminophen     Tablet .. 650 milliGRAM(s) Oral every 6 hours PRN Mild Pain (1 - 3)    Respiratory Medications    Cardiovascular Medications  losartan 50 milliGRAM(s) Oral daily    Gastrointestinal Medications  potassium chloride  20 mEq/100 mL IVPB 20 milliEquivalent(s) IV Intermittent every 2 hours  senna 2 Tablet(s) Oral at bedtime    Genitourinary Medications    Hematologic/Oncologic Medications  heparin   Injectable 5000 Unit(s) SubCutaneous every 8 hours    Antimicrobial/Immunologic Medications    Endocrine/Metabolic Medications    Topical/Other Medications  chlorhexidine 2% Cloths 1 Application(s) Topical <User Schedule>      ICU Vital Signs Last 24 Hrs  T(C): 36.6 (22 Jul 2022 08:00), Max: 36.6 (22 Jul 2022 08:00)  T(F): 97.9 (22 Jul 2022 08:00), Max: 97.9 (22 Jul 2022 08:00)  HR: 76 (22 Jul 2022 08:00) (75 - 118)  BP: 122/76 (22 Jul 2022 08:00) (122/76 - 193/79)  BP(mean): 93 (22 Jul 2022 08:00) (93 - 111)  ABP: --  ABP(mean): --  RR: 20 (22 Jul 2022 08:00) (18 - 20)  SpO2: 97% (22 Jul 2022 08:00) (95% - 97%)    O2 Parameters below as of 22 Jul 2022 05:00  Patient On (Oxygen Delivery Method): room air            Adult Advanced Hemodynamics Last 24 Hrs  CVP(mm Hg): --  CVP(cm H2O): --  CO: --  CI: --  PA: --  PA(mean): --  PCWP: --  SVR: --  SVRI: --  PVR: --  PVRI: --          I&O's Summary    21 Jul 2022 07:01  -  22 Jul 2022 07:00  --------------------------------------------------------  IN: 0 mL / OUT: 400 mL / NET: -400 mL      I&O's Detail    21 Jul 2022 07:01  -  22 Jul 2022 07:00  --------------------------------------------------------  IN:  Total IN: 0 mL    OUT:    Voided (mL): 400 mL  Total OUT: 400 mL    Total NET: -400 mL          PHYSICAL EXAM:    General/Neuro   Deficits:  alert & oriented x 3, no focal deficits, MOSCOSO strength intact b/l    Lungs:      clear to auscultation, Normal expansion/effort.     Cardiovascular : S1, S2.  Regular rate and rhythm.   Cardiac Rhythm: Normal Sinus Rhythm    GI: Abdomen soft, Non-tender, Non-distended.      Extremities: Extremities warm, pink, well-perfused.     Derm: Good skin turgor, no skin breakdown.      : Voiding      CXR:       LABS:  CAPILLARY BLOOD GLUCOSE                              13.3   15.06 )-----------( 242      ( 21 Jul 2022 22:41 )             39.5       07-21    137  |  100  |  23<H>  ----------------------------<  107<H>  4.5   |  26  |  0.7    Ca    9.1      21 Jul 2022 22:41  Phos  3.5     07-21  Mg     1.9     07-21    TPro  6.7  /  Alb  4.2  /  TBili  0.4  /  DBili  x   /  AST  18  /  ALT  16  /  AlkPhos  73  07-20

## 2022-07-22 NOTE — DISCHARGE NOTE PROVIDER - NSFOLLOWUPCLINICS_GEN_ALL_ED_FT
[facility]  Trauma Surgery  [address] [place] [address]  Phone: [phone]  Fax:

## 2022-07-22 NOTE — OCCUPATIONAL THERAPY INITIAL EVALUATION ADULT - PERTINENT HX OF CURRENT PROBLEM, REHAB EVAL
89yF w/ PMHx of HTN, aortic aneurysm seen as Trauma Consult s/p blunt head trauma. Patient reports while taking her blood pressure medications she fell forward and hit her forehead on the counter. +HT, -LOC, -AC. Patient did not fall onto the ground and was able to ambulate afterwards. Patient reports obtaining a forehead laceration that was bleeding which brought her to the ED at [facility]. Laceration was repaired, imaging was done and patient was sent to [place]

## 2022-07-22 NOTE — PROGRESS NOTE ADULT - ASSESSMENT
Assessment & Plan  89y Female s/p fall (+HT, -LOC, -AC) w/ C1 vertebra fx, type II dens fracture.    NEURO:  #C1 vertebra fracture, type II dens fracture  - Pain: Tylenol PRN  - [place] J collar in place  - Neurosurgery consult: no neurosurgical intervention, f/u w/ Dr. Pulido in 2 weeks outpatient  - Q4 neuro checks  - CTA: The vertebral arteries are patent, without evidence of   vascular occlusion, extravasation, or dissection. mild compression upon the right vertebral artery by the inferior corner of the lateral mass of C1, which is mildly displaced laterally    RESP:  - monitor SpO2  - h/o bronchiectasis  - encourage IS    CARDS:   #HTN  - continue home losartan 50mg qd PO (last dose 7/20 AM)  - EKG: NSR; QTc 419    GI/NUTR:   - Diet: DASH    /RENAL:   - Monitor UOP  - IV fluids: LR @ 85 cc/hr  - IVL once start diet  Labs:          BUN/Cr- 15/0.7  -->,  23/0.7  -->          Electrolytes-Na 137 // K 4.5 // Mg 1.9 //  Phos 3.5    HEME/ONC:   DVT prophylaxis- hsq, SCDs   Labs: Hb/Hct:  13/39              Plts:  242                 PTT/INR:      - T&S ordered for PM    ID:  WBC- 17.32  --->  15  Temp trend- 24hrs T(F): 98.5 (07-20 @ 15:28), Max: 98.5 (07-20 @ 15:28)         ENDO:  - Glucose, Serum: 107  - no chronic issues  - FS q6h while NPO    LINES/DRAINS:  PIV    Advanced Directives: Presumed Full Code    HCP/Emergency Contact-    DISPO:    SICU (stepdown). Case discussed with Dr. Cid     Assessment & Plan  89y Female s/p fall (+HT, -LOC, -AC) w/ C1 vertebra fx, type II dens fracture.    NEURO:  #C1 vertebra fracture, type II dens fracture  - Pain: Tylenol PRN  - [place] J collar in place  - Neurosurgery consult: no neurosurgical intervention, f/u w/ Dr. Pulido in 2 weeks outpatient  - Q4 neuro checks  - CTA: The vertebral arteries are patent, without evidence of   vascular occlusion, extravasation, or dissection. mild compression upon the right vertebral artery by the inferior corner of the lateral mass of C1, which is mildly displaced laterally    RESP:  - monitor SpO2  - h/o bronchiectasis  - encourage IS    CARDS:   #HTN  - continue home losartan 50mg qd PO (last dose 7/20 AM)  - EKG: NSR; QTc 419  - Episode of transient Afib rvr resolved with Lopressor 5mg IVPx1. SICU attending aware.   - EKG 7/22:     GI/NUTR:   - Diet: DASH    /RENAL:   - Monitor UOP  - IV fluids: LR @ 85 cc/hr--> IVL  - IVL once start diet  Labs:          BUN/Cr- 15/0.7  -->,  23/0.7  -->          Electrolytes-Na 137 // K 4.5 // Mg 1.9 //  Phos 3.5    HEME/ONC:   DVT prophylaxis- hsq, SCDs   Labs: Hb/Hct:  13/39              Plts:  242                 PTT/INR:      - T&S ordered for PM    ID:  WBC- 17.32  --->  15  Temp trend- 24hrs T(F): 98.5 (07-20 @ 15:28), Max: 98.5 (07-20 @ 15:28)         ENDO:  - Glucose, Serum: 107  - no chronic issues  - FS q6h while NPO    LINES/DRAINS:  PIV    DISPO:    Downgrade     Assessment & Plan  89y Female s/p fall (+HT, -LOC, -AC) w/ C1 vertebra fx, type II dens fracture.    NEURO:  #C1 vertebra fracture, type II dens fracture  - Pain: Tylenol PRN  - [place] J collar in place  - Neurosurgery consult: no neurosurgical intervention, f/u w/ Dr. Pulido in 2 weeks outpatient  - Q4 neuro checks  - CTA: The vertebral arteries are patent, without evidence of   vascular occlusion, extravasation, or dissection. mild compression upon the right vertebral artery by the inferior corner of the lateral mass of C1, which is mildly displaced laterally    RESP:  - monitor SpO2  - h/o bronchiectasis  - encourage IS    CARDS:   #HTN  - continue home losartan 50mg qd PO (last dose 7/20 AM)  - EKG: NSR; QTc 419  - Episode of transient Afib rvr resolved with Lopressor 5mg IVPx1, EKG now NSR. SICU attending aware.   - EKG 7/22: NSR, Possible Anterior Infarct (Inverted T waves in V2 and V3, consistent with prior EKG)     GI/NUTR:   - Diet: DASH    /RENAL:   - Monitor UOP  - IV fluids: LR @ 85 cc/hr--> IVL  - IVL once start diet  Labs:          BUN/Cr- 15/0.7  -->,  23/0.7  -->          Electrolytes-Na 137 // K 4.5 // Mg 1.9 //  Phos 3.5    HEME/ONC:   DVT prophylaxis- hsq, SCDs   Labs: Hb/Hct:  13/39              Plts:  242                 PTT/INR:      - T&S ordered for PM    ID:  WBC- 17.32  --->  15  Temp trend- 24hrs T(F): 98.5 (07-20 @ 15:28), Max: 98.5 (07-20 @ 15:28)         ENDO:  - Glucose, Serum: 107  - no chronic issues  - FS q6h while NPO    LINES/DRAINS:  PIV    DISPO:    Downgrade

## 2022-07-22 NOTE — PROGRESS NOTE ADULT - SUBJECTIVE AND OBJECTIVE BOX
TRAUMA SURGERY PROGRESS NOTE    Patient: CHAZ BUCHANAN , 89y ([birth year])Female   MRN: [id number]  Location: [address] 007 A  Visit: 07-20-22 Inpatient  Date: 07-22-22 @ 12:21    Hospital Day #: 2    Procedure/Dx/Injuries: acute displaced fracture of C1, type II dens fracture w/minimal angulation of superior fracture fragment    Events of past 24 hours:  7/21  Night  - voiding  - pain controlled  - dg, waiting for bed   - This AM transient episode of afib w/ RVR to 118, resolved with Lopressor. EKG now Normal Sinus Rhythm. SICU attending aware.     Day   - PT  - fu neurosurg re: hep sq vs lovenox  - start diet   - per cards, give vitamin k    Possibe 4a today. Worked with PT/OT/physiatry       PAST MEDICAL & SURGICAL HISTORY:  Bronchiectasis      Aneurysm of aorta  Throacic      HTN (hypertension)      No significant past surgical history  Excisions of skin cancer on left arm          Vitals:   T(F): 97.9 (07-22-22 @ 08:00), Max: 97.9 (07-22-22 @ 08:00)  HR: 76 (07-22-22 @ 08:00)  BP: 122/76 (07-22-22 @ 08:00)  RR: 20 (07-22-22 @ 08:00)  SpO2: 97% (07-22-22 @ 08:00)      Diet, DASH/TLC:   Sodium & Cholesterol Restricted      Fluids:     I & O's:    07-21-22 @ 07:01  -  07-22-22 @ 07:00  --------------------------------------------------------  IN:  Total IN: 0 mL    OUT:    Voided (mL): 400 mL  Total OUT: 400 mL    Total NET: -400 mL      PHYSICAL EXAM:  General: NAD, GCS 15  HEENT: Normocephalic, EOMI  Neck: Soft, midline trachea. [place] J in place.  Respiratory: Nonlabored breathing   Abdomen: Soft, non-distended, non-tender, no rebound, no guarding.  Ext:  Moving b/l upper and lower extremities.       MEDICATIONS  (STANDING):  chlorhexidine 2% Cloths 1 Application(s) Topical <User Schedule>  heparin   Injectable 5000 Unit(s) SubCutaneous every 8 hours  losartan 50 milliGRAM(s) Oral daily  potassium chloride  20 mEq/100 mL IVPB 20 milliEquivalent(s) IV Intermittent every 2 hours  senna 2 Tablet(s) Oral at bedtime    MEDICATIONS  (PRN):  acetaminophen     Tablet .. 650 milliGRAM(s) Oral every 6 hours PRN Mild Pain (1 - 3)      DVT PROPHYLAXIS: SCDs, heparin   Injectable 5000 Unit(s) SubCutaneous every 8 hours    GI PROPHYLAXIS:   ANTICOAGULATION:   ANTIBIOTICS:           LAB/STUDIES:  Labs:  CAPILLARY BLOOD GLUCOSE                              13.3   15.06 )-----------( 242      ( 21 Jul 2022 22:41 )             39.5         07-21    137  |  100  |  23<H>  ----------------------------<  107<H>  4.5   |  26  |  0.7      Calcium, Total Serum: 9.1 mg/dL (07-21-22 @ 22:41)      LFTs:             6.7  | 0.4  | 18       ------------------[73      ( 20 Jul 2022 14:02 )  4.2  | x    | 16          Lipase:x      Amylase:x             Coags:    IMAGING: No new imaging.

## 2022-07-22 NOTE — DISCHARGE NOTE PROVIDER - NSDCCPCAREPLAN_GEN_ALL_CORE_FT
PRINCIPAL DISCHARGE DIAGNOSIS  Diagnosis: Closed nondisplaced type II dens fracture  Assessment and Plan of Treatment:       SECONDARY DISCHARGE DIAGNOSES  Diagnosis: Closed C1 fracture  Assessment and Plan of Treatment:      PRINCIPAL DISCHARGE DIAGNOSIS  Diagnosis: Closed nondisplaced type II dens fracture  Assessment and Plan of Treatment: Patient is to follow up with Dr. Vail in 2 weeks. Please call office for confirmation of your appointment.  Pain: You can take over the counter medications such as Tylenol, and Ibuprofen for pain control. Please adhere to the instructions on the back of the bottle. If it was discussed that you would be receiving prescription pain medication upon discharge, this prescription will be sent to your pharmacy.  If you develop fevers, chills, worsening pain or any other symptoms of concern, please call MD for further advice, evaluation, and/or treatment.        SECONDARY DISCHARGE DIAGNOSES  Diagnosis: Closed C1 fracture  Assessment and Plan of Treatment:      PRINCIPAL DISCHARGE DIAGNOSIS  Diagnosis: Closed nondisplaced type II dens fracture  Assessment and Plan of Treatment: Patient is to follow up with Dr. Pulido in 2 weeks. Please call office for confirmation of your appointment. Please keep the collar on until the patient follows up with Neurosurgery.  Pain: You can take over the counter medications such as Tylenol, and Ibuprofen for pain control. Please adhere to the instructions on the back of the bottle. If it was discussed that you would be receiving prescription pain medication upon discharge, this prescription will be sent to your pharmacy.  If you develop chest pain, shortness of breath, fevers, chills, worsening pain or any other symptoms of concern, please call MD for further advice, evaluation, and/or treatment.        SECONDARY DISCHARGE DIAGNOSES  Diagnosis: Closed C1 fracture  Assessment and Plan of Treatment: The patient should continue with bedside PT 3-5 times a week, and bedside OT 2-3 times a week.

## 2022-07-22 NOTE — PROGRESS NOTE ADULT - ASSESSMENT
89y Female s/p fall (+HT, -LOC, -AC) w/ C1 vertebra fx, type II dens fracture.    Plan:  -Care per SICU team -> DG (to 4a possibly today)  -No NSX intervention  -Hard Collar at all times  -Pain control  -Strict I&O's  -Monitor electrolytes and replete    [id number]

## 2022-07-22 NOTE — DISCHARGE NOTE PROVIDER - CARE PROVIDER_API CALL
Tiara Pulido)  Neurosurgery  [address], [address]  [place] [address]  Phone: [phone]  Fax: [phone]  Follow Up Time: 2 weeks

## 2022-07-22 NOTE — PROGRESS NOTE ADULT - ASSESSMENT
Imp : rehab of multitrauma / s/p fall / C1,C2 fx / multilevel neuroforaminal narrowing   Plan : continue bedside therapy as tolerated           Pt can tolerate 3hr PT/OT daily and motivated           Good 4a acute inpatient rehab candidate  Imp : rehab of multitrauma / s/p fall / C1,C2 fx, forehead laceration, scalp hematoma, / multilevel neuroforaminal narrowing   Plan : continue bedside therapy as tolerated           Pt can tolerate 3hr PT/OT daily and motivated           Good 4a acute inpatient rehab candidate

## 2022-07-22 NOTE — PATIENT PROFILE ADULT - FALL HARM RISK - HARM RISK INTERVENTIONS

## 2022-07-22 NOTE — DISCHARGE NOTE NURSING/CASE MANAGEMENT/SOCIAL WORK - NSDCVIVACCINE_GEN_ALL_CORE_FT
Tdap; 15-Feb-2022 11:47; Sanseverino, Jennifer (RN); [employer]; [id number] (Exp. Date: 28-Sep-2023); IntraMuscular; Deltoid Right.; 0.5 milliLiter(s); VIS (VIS Published: 09-May-2013, VIS Presented: 15-Feb-2022);

## 2022-07-22 NOTE — H&P ADULT - REASON FOR ADMISSION
rehabilitation of multitrauma after fall with resulting displaced C1 vertebrae, c2 odontoid fracture, and scalp hematoma with significant decline in function

## 2022-07-22 NOTE — DISCHARGE NOTE NURSING/CASE MANAGEMENT/SOCIAL WORK - NSDCPEFALRISK_GEN_ALL_CORE
For information on Fall & Injury Prevention, visit: [email or web address] OR  [email or web address] OR  https://www.cdc.gov/steadi/patient.html

## 2022-07-22 NOTE — PHYSICAL THERAPY INITIAL EVALUATION ADULT - GENERAL OBSERVATIONS, REHAB EVAL
PT -10am. Patient left in sitting in NAD, c/o 9/10 pain at cervical spine with movement. +call bell, +chair alarm, +telemetry, +cervical collar.

## 2022-07-22 NOTE — PROGRESS NOTE ADULT - SUBJECTIVE AND OBJECTIVE BOX
Patient is a 89y old  Female who presents with a chief complaint of fall    HPI:  89yF w/ PMHx of HTN, aortic aneurysm seen as Trauma Consult s/p blunt head trauma. Patient reports while taking her blood pressure medications she fell forward and hit her forehead on the counter. +HT, -LOC, -AC. Patient did not fall onto the ground and was able to ambulate afterwards. Patient reports obtaining a forehead laceration that was bleeding which brought her to the ED at [facility]. Laceration was repaired, imaging was done and patient was sent to [facility]. Patient reports having head pain and mild posterior neck pain that does not radiate. Denies CP, SOB, abdominal pain, extremity weakness/numbness. Trauma assessment in ED: ABCs intact , GCS 15 , AAOx3.  #C1 vertebra fracture, type II dens fracture          PHYSICAL EXAM    Vital Signs Last 24 Hrs  T(C): 36.6 (22 Jul 2022 08:00), Max: 36.6 (22 Jul 2022 08:00)  T(F): 97.9 (22 Jul 2022 08:00), Max: 97.9 (22 Jul 2022 08:00)  HR: 76 (22 Jul 2022 08:00) (75 - 118)  BP: 122/76 (22 Jul 2022 08:00) (122/76 - 193/79)  BP(mean): 93 (22 Jul 2022 08:00) (93 - 111)  RR: 20 (22 Jul 2022 08:00) (18 - 20)  SpO2: 97% (22 Jul 2022 08:00) (95% - 97%)    Parameters below as of 22 Jul 2022 05:00  Patient On (Oxygen Delivery Method): room air        Constitutional - NAD  Chest - CTA  Cardiovascular - S1S2+  Abdomen -  Soft  Extremities -  No calf tenderness   Function : transfer CG / ADL min - mod A                 ambulate 30'RW CG    acetaminophen     Tablet .. 650 milliGRAM(s) Oral every 6 hours PRN  chlorhexidine 2% Cloths 1 Application(s) Topical <User Schedule>  heparin   Injectable 5000 Unit(s) SubCutaneous every 8 hours  losartan 50 milliGRAM(s) Oral daily  potassium chloride  20 mEq/100 mL IVPB 20 milliEquivalent(s) IV Intermittent every 2 hours  senna 2 Tablet(s) Oral at bedtime      RECENT LABS/IMAGING                        13.3   15.06 )-----------( 242      ( 21 Jul 2022 22:41 )             39.5     07-21    137  |  100  |  23<H>  ----------------------------<  107<H>  4.5   |  26  |  0.7    Ca    9.1      21 Jul 2022 22:41  Phos  3.5     07-21  Mg     1.9     07-21    TPro  6.7  /  Alb  4.2  /  TBili  0.4  /  DBili  x   /  AST  18  /  ALT  16  /  AlkPhos  73  07-20

## 2022-07-22 NOTE — PATIENT PROFILE ADULT - NSPRESCRALCSCORE_GEN_A_NUR_CAL
Have you seen any other physician or provider since your last visit yes - injections in knees    Have you had any other diagnostic tests since your last visit? yes - labs    Have you changed or stopped any medications since your last visit? no         Pt here for fu on dm only occasionally checks fs. Pt wants to discuss lung screening. 2

## 2022-07-22 NOTE — H&P ADULT - NSHPPHYSICALEXAM_GEN_ALL_CORE
PHYSICAL EXAMINATION   VItals: T(C): 36.6 (07-22-22 @ 08:00), Max: 36.6 (07-22-22 @ 08:00)  HR: 93 (07-22-22 @ 12:00) (75 - 118)  BP: 155/80 (07-22-22 @ 12:00) (122/76 - 193/79)  RR: 18 (07-22-22 @ 12:00) (18 - 20)  SpO2: 97% (07-22-22 @ 12:00) (95% - 97%)    General:[  x ] NAD, Resting Comfortable,   [   ] other: pleasant                               HEENT: [   ] NC/AT, EOMI, PERRL , Normal Conjunctivae,   [x   ] other: forehead bossoming. laceration above left eye covered by steristrip. b/l raccoon eyes.   Cardio: [x   ] RRR, no murmur,   [   ] other:                              Pulm: [ x  ] No Respiratory Distress,  Lungs CTAB,   [   ] other:                       Abdomen: [ x  ]ND/NT, Soft,   [   ] other:    : [  x ] NO DYKES CATHETER, [   ] DYKES CATHETER- no meatal tear, no discharge, [   ] other:                                            MSK: [ x  ] No joint swelling, Full ROM,   [  x ] other:  ulnar deviation of fingers b/l                                       Ext: [   x]No C/C/E, No calf tenderness,   [   ]other:    Skin: [  x ]intact,   [ x  ] other:  1/2 inch diameter mole on right hand                                                                   Neurological Examination:  Cognitive: [ x   ] AAO x 3,   [    ]  other:                                                                      Attention:  [ x   ] intact,   [    ]  other: 3/3 word recall                             Memory: [  x  ] intact,    [    ]  other:     Mood/Affect: [  x  ] wnl,    [    ]  other:                                                                             Communication: [  x  ]Fluent, no dysarthria, following commands:  [    ] other:   CN II - XII:  [   x ] intact,  [    ] other:                                                                                        Motor:   RIGHT UE: [   ] WNL,  [   ] other:  LEFT    UE: [   ] WNL,  [   ] other:  RIGHT LE: [   ] WNL,  [   ] other:   LEFT    LE: [   ] WNL,  [   ] other:    Tone: [    ] wnl,   [    ]  other:  DTRs: [   ]symmetric, [   ] other:  Coordination:   [    ] intact,   [    ] other:                                                                           Sensory: [    ] Intact to light touch,   [    ] other: PHYSICAL EXAMINATION   VItals: T(C): 36.6 (07-22-22 @ 08:00), Max: 36.6 (07-22-22 @ 08:00)  HR: 93 (07-22-22 @ 12:00) (75 - 118)  BP: 155/80 (07-22-22 @ 12:00) (122/76 - 193/79)  RR: 18 (07-22-22 @ 12:00) (18 - 20)  SpO2: 97% (07-22-22 @ 12:00) (95% - 97%)    General:[  x ] NAD, Resting Comfortable,   [   ] other: pleasant                               HEENT: [   ] NC/AT, EOMI, PERRL , Normal Conjunctivae,   [x   ] other: forehead bossoming. laceration above left eye covered by steristrip. b/l raccoon eyes.   Cardio: [x   ] RRR, no murmur,   [   ] other:                              Pulm: [ x  ] No Respiratory Distress,  Lungs CTAB,   [   ] other:                       Abdomen: [ x  ]ND/NT, Soft,   [   ] other:    : [  x ] NO DYKES CATHETER, [   ] DYKES CATHETER- no meatal tear, no discharge, [   ] other:                                            MSK: [ x  ] No joint swelling, Full ROM,   [  x ] other:  ulnar deviation of fingers b/l                                       Ext: [   x]No C/C/E, No calf tenderness,   [   ]other:    Skin: [  x ]intact,   [ x  ] other:  1/2 inch diameter mole on right hand                                                                   Neurological Examination:  Cognitive: [ x   ] AAO x 3,   [    ]  other:                                                                      Attention:  [ x   ] intact,   [    ]  other: 3/3 word recall                             Memory: [  x  ] intact,    [    ]  other:     Mood/Affect: [  x  ] wnl,    [    ]  other:                                                                             Communication: [  x  ]Fluent, no dysarthria, following commands:  [    ] other:   CN II - XII:  [   x ] intact,  [    ] other:                                                                                        Motor:   RIGHT UE: [   ] WNL,  [   ] other: 4/5   LEFT    UE: [   ] WNL,  [   ] other: 4/5  RIGHT LE: [   ] WNL,  [   ] other: 4/5  LEFT    LE: [   ] WNL,  [   ] other: 4/5  2+/5 b/l shoulders   Tone: [   x ] wnl,   [    ]  other:  DTRs: [x   ]symmetric, [   ] other:  Coordination:   [ x   ] intact,   [    ] other:                                                               Sensory: [ x   ] Intact to light touch,   [    ] other: PHYSICAL EXAMINATION   VItals: T(C): 36.6 (07-22-22 @ 08:00), Max: 36.6 (07-22-22 @ 08:00)  HR: 93 (07-22-22 @ 12:00) (75 - 118)  BP: 155/80 (07-22-22 @ 12:00) (122/76 - 193/79)  RR: 18 (07-22-22 @ 12:00) (18 - 20)  SpO2: 97% (07-22-22 @ 12:00) (95% - 97%)    General:[  x ] NAD, Resting Comfortable,   [   ] other: pleasant                               HEENT: [   ] NC/AT, EOMI, PERRL , Normal Conjunctivae,   [x   ] other:  left forehead with large hematoma and echymosis,  laceration above left eye covered by steristrip. b/l raccoon eyes.   Cardio: [x   ] RRR, no murmur,   [   ] other:                              Pulm: [ x  ] No Respiratory Distress,  Lungs CTAB,   [   ] other:                       Abdomen: [ x  ]ND/NT, Soft,   [   ] other:    : [  x ] NO DYKES CATHETER, [   ] DYKES CATHETER- no meatal tear, no discharge, [   ] other:                                            MSK: [ x  ] No joint swelling, Full ROM,   [  x ] other:  ulnar deviation of fingers b/l                                       Ext: [   x]No C/C/E, No calf tenderness,   [   ]other:    Skin: [  x ]intact,   [ x  ] other:  1/2 inch diameter mole on right hand                                                                   Neurological Examination:  Cognitive: [ x   ] AAO x 3,   [    ]  other:                                                                      Attention:  [ x   ] intact,   [    ]  other: 3/3 word recall                             Memory: [  x  ] intact,    [    ]  other:     Mood/Affect: [  x  ] wnl,    [    ]  other:                                                                             Communication: [  x  ]Fluent, no dysarthria, following commands:  [    ] other:   CN II - XII:  [   x ] intact,  [    ] other:                                                                                        Motor:   RIGHT UE: [   ] WNL,  [   ] other: 4/5   LEFT    UE: [   ] WNL,  [   ] other: 4/5  RIGHT LE: [   ] WNL,  [   ] other: 4/5  LEFT    LE: [   ] WNL,  [   ] other: 4/5  2+/5 b/l shoulders   Tone: [   x ] wnl,   [    ]  other:  DTRs: [x   ]symmetric, [   ] other:  Coordination:   [ x   ] intact,   [    ] other:                                                               Sensory: [ x   ] Intact to light touch,   [    ] other:

## 2022-07-22 NOTE — DISCHARGE NOTE PROVIDER - NSDCMRMEDTOKEN_GEN_ALL_CORE_FT
acetaminophen 325 mg oral tablet: 2 tab(s) orally every 6 hours, As needed, Mild Pain (1 - 3)  alendronate 10 mg oral tablet: 1 tab(s) orally once a day  losartan 50 mg oral tablet: 1 tab(s) orally once a day

## 2022-07-22 NOTE — H&P ADULT - NSHPREVIEWOFSYSTEMS_GEN_ALL_CORE
Constiutional:    [   ] WNL           [   ] poor appetite   [   ] insomnia   [   ] tired   Cardio:                [   ] WNL           [   ] CP   [   ] HINDS   [   ] palpitations               Resp:                   [   ] WNL           [   ] SOB   [   ] cough   [   ] wheezing   GI:                        [   ] WNL           [   ] constipation   [   ] diarrhea   [   ] abdominal pain   [   ] nausea   [   ] emesis                                :                      [   ] WNL           [   ] DYKES  [   ] dusuria   [   ] difficulty voiding             Endo:                   [   ] WNL          [   ] po;yuria   [   ] temperature intolerance                 Skin:                     [   ] WNL          [   ] pain   [   ] wound   [   ] rash   MSK:                    [   ] WNL          [   ] muscle pain   [   ] joint pain/ stiffness   [   ] muscle tenderness   [   ] swelling   Neuro:                 [   ] WNL          [   ] HA   [   ] change in vision   [   ] tremor   [   ] weakness   [   ]dysphagia              Cognitive:           [   ] WNL           [   ]confusion      Psych:                  [   ] WNL           [   ] hallucinations   [   ]agitation   [   ] delusion   [   ]depression Constiutional:    [  x ] WNL           [   ] poor appetite   [   ] insomnia   [   ] tired   Cardio:                [ x  ] WNL           [   ] CP   [   ] HINDS   [   ] palpitations               Resp:                   [ x  ] WNL           [   ] SOB   [   ] cough   [   ] wheezing   GI:                        [  x ] WNL           [   ] constipation   [   ] diarrhea   [   ] abdominal pain   [   ] nausea   [   ] emesis                                :                      [ x  ] WNL           [   ] DKYES  [   ] dusuria   [   ] difficulty voiding             Endo:                   [ x  ] WNL          [   ] polyuria   [   ] temperature intolerance                 Skin:                     [ x  ] WNL          [   ] pain   [   ] wound   [   ] rash   MSK:                    [  x ] WNL          [   ] muscle pain   [   ] joint pain/ stiffness   [   ] muscle tenderness   [   ] swelling   Neuro:                 [ x  ] WNL          [   ] HA   [   ] change in vision   [   ] tremor   [   ] weakness   [   ]dysphagia    [  x ] severe Mentasta          Cognitive:           [ x  ] WNL           [   ]confusion      Psych:                  [  x ] WNL           [   ] hallucinations   [   ]agitation   [   ] delusion   [   ]depression

## 2022-07-22 NOTE — DISCHARGE NOTE PROVIDER - HOSPITAL COURSE
89yF w/ PMHx of HTN, aortic aneurysm seen as Trauma Consult s/p blunt head trauma. The patient reported that while taking her blood pressure medications she fell forward and hit her forehead on the counter. +HT, -LOC, -AC. Patient reported that she did not fall onto the ground and was able to ambulate afterwards. The patient reported obtaining a forehead laceration that was bleeding which brought her to the ED at [facility]. The laceration was repaired, imaging was done and patient was sent to [facility]. The patient reports having head pain and mild posterior neck pain that does not radiate. The patient denies CP, SOB, abdominal pain, extremity weakness/numbness. The patients trauma assessment in ED: ABCs intact , GCS 15 , AAOx3. The patient was admitted to the step down unit after the fall. The trauma workup revealed a fractured C1 and type II dens fracture. The CTA demonstrated that the vertebral arteries are patent, without evidence of vascular occlusion, extravasation, or dissection. mild compression upon the right vertebral artery by the inferior corner of the lateral mass of C1, which is mildly displaced laterally. The patient was seen by neurosurgery. The neurosurgery team recommended a hard collar be in place at all times and no acute surgical intervention. The patient is to follow up with Dr. Pulido Neurosurgery in 2 weeks. The patient is stable from a trauma surgery perspective and is cleared to be discharged to the inpatient facility. 89yF w/ PMHx of HTN, aortic aneurysm seen as Trauma Consult s/p blunt head trauma. The patient reported that while taking her blood pressure medications she fell forward and hit her forehead on the counter. +HT, -LOC, -AC. Patient reported that she did not fall onto the ground and was able to ambulate afterwards. The patient reported obtaining a forehead laceration that was bleeding which brought her to the ED at [facility]. The laceration was repaired, imaging was done and patient was sent to [facility]. The patient reports having head pain and mild posterior neck pain that does not radiate. The patient denies CP, SOB, abdominal pain, extremity weakness/numbness. The patients trauma assessment in ED: ABCs intact , GCS 15 , AAOx3. The patient was admitted to the step down unit after the fall. The trauma workup revealed a fractured C1 and type II dens fracture. The CTA demonstrated that the vertebral arteries are patent, without evidence of vascular occlusion, extravasation, or dissection. mild compression upon the right vertebral artery by the inferior corner of the lateral mass of C1, which is mildly displaced laterally. The patient was seen by neurosurgery. The neurosurgery team recommended a hard collar be in place at all times and no acute surgical intervention. The patient is to follow up with Dr. Pulido Neurosurgery in 2 weeks. The patient is stable from a trauma surgery perspective and is cleared to be discharged to the inpatient facility.

## 2022-07-22 NOTE — H&P ADULT - TIME BILLING
chart review, comprehensive teaching history and physical  with resident, patient and family counseling and education regarding disease risk factors and expected outcome and the inpatient rehab process, coordination with nursing, ACP and therapists and discussion with discharging team regarding handoff of the patient's condition and hospital course.

## 2022-07-22 NOTE — H&P ADULT - HISTORY OF PRESENT ILLNESS
fell forward . no loc. lost balance.   has fallen before in feb 2022 and went to [facility]. no loc.     - ros for chest pain sob current dizziness numbness tingilign weakness memory changes no urinary or bowel changes     previously used a walker and cane after her last fall.     lives alone 2 steps to enter. son is in [place].     pmh hx of aortic aneursym with caridologist dr brandt   follows with pulmonology dr mccabe for bronchiectasis/pneumonia  doesnt smoke   social drinker  no drugs  no fam hx   allergies per chart    This is a 89yo PMH of HTN, aortic aneurysm who presented as a trauma after a fall. Patient endorsed she was taking her blood pressure medication and then fell forward and hit her head on the counter. No LOC, and no AC. She did not fall to the ground, and was able to ambulate. She had associated head and neck pain that was non-radiating. Patient sought medical attention at [facility] for a forehead laceration that was actively bleeding which she sustained after the trauma. Imaging was done revealed multiple traumas including multiple acute displaced fractures of C1 vertebrae with mild compression of right vertebral artery, and C2 type II odontoid fracture with angulation of superior fracture fragment, supraorbital scalp hematoma. Patient was transferred to [facility] and admitted to SICU.  Neurosurgery was consulted, and no surgical intervention was recommended; just to wear cervical MIAMI J collar at all times. Of note also patient had a fall previous to this in Feb 2022, after which went for rehab and then required a cane and walker for ambulation but still was independent with ADLs; prior to that fall she was independent. Patient was evaluated by physical therapy and occupational and was found to be CG RW 30',  transfer min assist, bed mobility mod assist, bathing max assist, upper body and lower body dressing mod assist, toilet hygiene mod assist, and grooming min assist. Patient was evaluated by physiatry and was found to be a good candidate for acute rehab given her multitrauma with significant injuries can benefit from a physiatrists supervision, and with significant decline in function from baseline can benefit from multidisciplinary therapy 3hours per day for a minimum of 5 days of week.  Patient denied chest pain, shortness of breath, dizziness, paresthesias, new weakness, memory changes, and changes in urinary and bowel habits.

## 2022-07-22 NOTE — PROGRESS NOTE ADULT - ATTENDING COMMENTS
I reviewed the note and agree with the exam and plan of care.  Needs Cardiology eval for new onset A.fib.  Continue SDU care
Patient remains neurologically intact.  GCS 15    Assessment/Plan:  - keep C-collar at all time  - encourage IS  - aspiration precautions at all time  - PT eval  - DVT prophylaxis  - SS for dispo planning
Critical Care: [id number]   This patient has a high probability of sudden, clinically significant deterioration, which requires the highest level of physician preparedness to intervene urgently. I managed/supervised life or organ supporting interventions that required frequent physician assessment. I devoted my full attention in the ICU to the direct care of this patient for the period of time indicated below. Time I spent with the family or surrogate(s) is included only if the patient was incapable of providing the necessary information or participating in medical decision making. Time devoted to teaching and to any procedures I billed separately is not included.     CHAZ BUCHANAN 89y Female admitted to [ ] SICU /[x ] SDU with mechanical fall and C1-C2 fracture, no neurological deficit at this time, hemodynamic instability, airway at risk for obstruction. High risk for neurological decompensation    Patient is examined and evaluated at the bedside with SICU team. Treatment plan discussed with SICU team, nurses and primary team.   Chest X-ray and all relevant studies reviewed during rounds.  Will continue hemodynamic monitoring as per protocol in SICU.    Neuro:  GCS [15 ]   [x ]  Neurovascular checks as per SICU protocol                 [ ] 3% NaCl                     Paralysis [ ] Yes  [x ]  No      Sedated/Pain control with                 [ ] Dilaudid drip, [ ]  Ketamine, [ ] Fentanyl, [ ] Propofol, [ ] Precedex, [ ] Versed, [ ] Ativan,                           [ ] OxyContin standing,  [ ] OxyContin PRN, [ x] Dilaudid PRN pushes, [ ] Fentanyl PRN pushes, [ ] PCA,                [x ] Tylenol IV/PO, [x ] Gabapentin, [ ]  Ketorolac, [x ] Tramadol,  [ ] Lidoderm Patch       Other Medications               [ ] Seroquel, [ ] Zyprexa, [ ] Haldol, [ ] Zyprexa,  [ ] Clonazepam [ ] Xanax, [ ] Versed/Ativan PRN, [ ] Valium [x ] None               [ ] Robaxin   [ ] Baclofen  [ ] Flexeril               [ ] Keppra  [ ] Lamictal  [ ] Depakote  [ ] Dilantin               [ ] CIWA (Ativan/Valium/ Librium)  CV: continue to monitor  On pressors [ ]  Yes  [x ]  No          [ ]  Levophed, [ ] Lauri-Synephrine, [ ] Vasopressin, [ ]  Epinephrine          [ ] Dobutamine, [ ] Milrinone, [ ]  Midodrine,  [ ] Others    Other Cardiac Meds          [ ] Amiodarone IV/PO, [ ] Digoxin, [ ] Cardizem drip, [ ] Cleviprex drip, [ ] Esmolol drip  Respiratory: Acute respiratory insufficiency -> continue monitoring                        None Invasive Support  [x ] Incentive Spirometer                                  [ ] BiPAP   [ ] CPAP/NIV   [ ] HFNC   [ ] NR Face Mask   [x ] NC  [ ] Trach Caller                        Ventilatory support  [ ] Yes [x ] No                            [ ] SBT                                  [ ] PC    [ ] VC   [ ] AC   [ ] BiVent/APRV   [ ]CPAP   GI  [x ]  bowel regiment  [x ] BM none  [x ] Flatus +             [ ] Ostomy        Prophylaxis [x ] PPI  [ ] H2 Blockers  [ ] Others  Nutrition: continue   [x ] Diet DASH [ ] TPN/PPN   [ ] calories count   [ ]  Tube Feeds     Renal: Continue I&Os monitoring, Lucero catheter  [ ] Yes,  [x ]   No ,  [ ] Consideration for discontinuation [ ] Taxes cath/PrimaFit  [ ] TOV       Lytes/Acid-base: replete hypokalemia, hypomagnesemia, hypocalcemia, hypophosphatemia        IV Fluids   [x ] LR@100ml/hr,  [ ] NS  [ ] D5W1/2NS [ ] Bicarbonate  [ ] Albumin   ID: leukocytosis -> continue to monitor:         IV Abx [ ] Yes, [x ] No;  ID consulted [ ] Yes, [x ] No        Cultures send  [ ] Respiratory   [ ] Blood   [ ] Urine  [ ] Fluids  [ ] Tissue  [x ]  None  Lines:   [ ] Right   [ ] Left  [ ] Bilateral                     [ ] Subclavian TLC        [ ]  Internal Jugular TLC     [ ]  Femoral TLC                     [ ] Subclavian Cordis    [ ] Internal Jugular Cordis   [ ] Femoral Cordis                     [ ] HD catheter    [ ] PICC     [ ]  Midline   [ x] Peripheral IVs                                                 [ ] Right   [ ] Left   [ x] None                     [ ] Radial A-Line    [ ] Femoral A-Line   [ ] Axillary A-Line               Heme: continue to evaluate for acute blood loss anemia- trend Hg/Hct                     AC Reversal Indicated [ ] Yes  [ x] No                    Transfused  indicated  [ ] Yes, [x ] No    [ ] PRBCs   [ ] Platelets   [ ] FFPs   [ ] Cryoprecipitate                    Should be started on or continued with  following  [ ] Yes,  [x ] No                               [ ] Lovenox  [ ] Coumadin  [ ] Heparin drip  [ ] NOVACs  [ ] ASA  [ ] Antiplatelets   Endocrine: Prevent and treat hyperglycemia with insulin as needed,                                 Insuline drip [ ] Yes, [x ] No   PV: follow pulse exam  Skin: decub precautions  DVT Prophylaxis:  [x ] SCDs  [x ] Heparin SQ  [ ] Lovenox  SQ  Stress Gastritis Prophylaxis: PPI/H2 Blockers if indicated  Mobility: patient is evaluated at the bedside with mobility team and the goals for today are discussed with PT [x ] bed rest with spine precaution and advance to out of bed to chair    PATIENT/FAMILY/SURROGATE CONFERENCE:  [x ] Yes with patient at the bedside. [ ] No  PURPOSE: To obtain necessary information, To discuss treatment options under consideration today.    I saw and evaluated the patient personally. I have reviewed and agree with note above. Treatment plan discussed with SICU team, nurses and primary team at the time of the multidisciplinary rounds. The above note is NOT written at the time of rounds and will reflect all changes throughout management of the patient for the day note is written for.    Simin Cid MD, FACS  Trauma/ACS/SCC Attending
Critical Care: [id number]   This patient has a high probability of sudden, clinically significant deterioration, which requires the highest level of physician preparedness to intervene urgently. I managed/supervised life or organ supporting interventions that required frequent physician assessment. I devoted my full attention in the ICU to the direct care of this patient for the period of time indicated below. Time I spent with the family or surrogate(s) is included only if the patient was incapable of providing the necessary information or participating in medical decision making. Time devoted to teaching and to any procedures I billed separately is not included.     CHAZ BUCHANAN 89y Female admitted to [ ] SICU /[x ] SDU with mechanical fall and C1-C2 fracture, no neurological deficit at this time, hemodynamic instability, airway at risk for obstruction. High risk for neurological decompensation    Patient is examined and evaluated at the bedside with SICU team. Treatment plan discussed with SICU team, nurses and primary team.   Chest X-ray and all relevant studies reviewed during rounds.  Will continue hemodynamic monitoring as per protocol in SICU.    Neuro:  GCS [15 ]   [x ]  Neurovascular checks as per SICU protocol                 [ ] 3% NaCl                     Paralysis [ ] Yes  [x ]  No      Sedated/Pain control with                 [ ] Dilaudid drip, [ ]  Ketamine, [ ] Fentanyl, [ ] Propofol, [ ] Precedex, [ ] Versed, [ ] Ativan,                           [ ] OxyContin standing,  [ ] OxyContin PRN, [ x] Dilaudid PRN pushes, [ ] Fentanyl PRN pushes, [ ] PCA,                [x ] Tylenol IV/PO, [x ] Gabapentin, [ ]  Ketorolac, [x ] Tramadol,  [ ] Lidoderm Patch       Other Medications               [ ] Seroquel, [ ] Zyprexa, [ ] Haldol, [ ] Zyprexa,  [ ] Clonazepam [ ] Xanax, [ ] Versed/Ativan PRN, [ ] Valium [x ] None               [ ] Robaxin   [ ] Baclofen  [ ] Flexeril               [ ] Keppra  [ ] Lamictal  [ ] Depakote  [ ] Dilantin               [ ] CIWA (Ativan/Valium/ Librium)  CV: continue to monitor  On pressors [ ]  Yes  [x ]  No          [ ]  Levophed, [ ] Lauri-Synephrine, [ ] Vasopressin, [ ]  Epinephrine          [ ] Dobutamine, [ ] Milrinone, [ ]  Midodrine,  [ ] Others    Other Cardiac Meds          [ ] Amiodarone IV/PO, [ ] Digoxin, [ ] Cardizem drip, [ ] Cleviprex drip, [ ] Esmolol drip  Respiratory: Acute respiratory insufficiency -> continue monitoring                        None Invasive Support  [x ] Incentive Spirometer                                  [ ] BiPAP   [ ] CPAP/NIV   [ ] HFNC   [ ] NR Face Mask   [x ] NC  [ ] Trach Caller                        Ventilatory support  [ ] Yes [x ] No                            [ ] SBT                                  [ ] PC    [ ] VC   [ ] AC   [ ] BiVent/APRV   [ ]CPAP   GI  [x ]  bowel regiment  [x ] BM none  [x ] Flatus +             [ ] Ostomy        Prophylaxis [x ] PPI  [ ] H2 Blockers  [ ] Others  Nutrition: continue   [x ] Diet DASH  [ ] TPN/PPN   [ ] calories count   [ ]  Tube Feeds     Renal: Continue I&Os monitoring, Lucero catheter  [ ] Yes,  [x ]   No ,  [ ] Consideration for discontinuation [ ] Taxes cath/PrimaFit  [ ] TOV       Lytes/Acid-base: replete hypokalemia, hypomagnesemia, hypocalcemia, hypophosphatemia        IV Fluids   [x ] LR@100ml/hr,  [ ] NS  [ ] D5W1/2NS [ ] Bicarbonate  [ ] Albumin   ID: leukocytosis -> continue to monitor:         IV Abx [ ] Yes, [x ] No;  ID consulted [ ] Yes, [x ] No        Cultures send  [ ] Respiratory   [ ] Blood   [ ] Urine  [ ] Fluids  [ ] Tissue  [x ]  None  Lines:   [ ] Right   [ ] Left  [ ] Bilateral                     [ ] Subclavian TLC        [ ]  Internal Jugular TLC     [ ]  Femoral TLC                     [ ] Subclavian Cordis    [ ] Internal Jugular Cordis   [ ] Femoral Cordis                     [ ] HD catheter    [ ] PICC     [ ]  Midline   [ x] Peripheral IVs                                                 [ ] Right   [ ] Left   [ x] None                     [ ] Radial A-Line    [ ] Femoral A-Line   [ ] Axillary A-Line               Heme: continue to evaluate for acute blood loss anemia- trend Hg/Hct                     AC Reversal Indicated [ ] Yes  [ x] No                    Transfused  indicated  [ ] Yes, [x ] No    [ ] PRBCs   [ ] Platelets   [ ] FFPs   [ ] Cryoprecipitate                    Should be started on or continued with  following  [ ] Yes,  [x ] No                               [ ] Lovenox  [ ] Coumadin  [ ] Heparin drip  [ ] NOVACs  [ ] ASA  [ ] Antiplatelets   Endocrine: Prevent and treat hyperglycemia with insulin as needed,                                 Insuline drip [ ] Yes, [x ] No   PV: follow pulse exam  Skin: decub precautions  DVT Prophylaxis:  [x ] SCDs  [x ] Heparin SQ  [ ] Lovenox  SQ  Stress Gastritis Prophylaxis: PPI/H2 Blockers if indicated  Mobility: patient is evaluated at the bedside with mobility team and the goals for today are discussed with PT [x ] evaluation for discharge planning    PATIENT/FAMILY/SURROGATE CONFERENCE:  [x ] Yes with patient at the bedside. [ ] No  PURPOSE: To obtain necessary information, To discuss treatment options under consideration today.    I saw and evaluated the patient personally. I have reviewed and agree with note above. Treatment plan discussed with SICU team, nurses and primary team at the time of the multidisciplinary rounds. The above note is NOT written at the time of rounds and will reflect all changes throughout management of the patient for the day note is written for.    Simin Cid MD, FACS  Trauma/ACS/SCC Attending

## 2022-07-22 NOTE — H&P ADULT - ASSESSMENT
ASSESSMENT/PLAN    Rehab of       -Pain control:     -GI/Bowel Mgmt:    -Bladder management:      -Skin:  No active issues at this time    -FEN     - Diet:           Precautions / PROPHYLAXIS:      - Falls  - Ortho: Weight bearing status: wbat  - DVT prophylaxis:  - HARD OF HEARING    MEDICAL PROGNOSIS: GOOD            REHAB POTENTIAL: GOOD             ESTIMATED DISPOSITION: HOME WITH HOME CARE       [ x ]  The goals of the IRF admission were discussed with the patient and or family member, who agreed             ELOS:  [   x   ] 7-14    [    ]  14-21    [    ]  Other    THERAPY ORDERS and INITIAL INDIVIDUALIZED PLAN OF CARE:  This initial individualized interdisciplinary plan of care, which was established by me (the attending physiatrist), is based on elements from the post admission evaluation. The interdisciplinary therapy program is to be at least 3 hrs a day, at least 5 days per week from from physical, occupational and/ or speech therapies as ordered by me below.      [ x  ] P.T. 90 mins. /day at least 5 out of 7 days:  [  x ] superficial  modalities prn, [ x  ] A/AAROM, [ x  ] PREs, [ x  ] transfer training,            [ x  ] progressive ambulation, [x   ] stairs                                               [ x  ] O.T. 90 mins. /day at least 5 out of 7 days::  [ x  ] modalities prn,  [ x  ]A/AAROM, [ x  ] PREs, [  x ] functional transfer training, [ x  ] ADL training           [   ] cognitive/ perceptual eval and training, [   ] splint eval                                                  [   ] S.L.P:  [   ] speech eval, [   ] swallow eval     [   ] Neuropsychology eval     [ x  ] Individualized rec. therapy      RATIONALE FOR INPATIENT ADMISSION - Patient demonstrates the following: (check all that apply)  [X] Medically appropriate for acute rehabilitation admission. Requires interdisiplinary therapy consisting of at least PT and OT, at least 3 hrs. a day at least 5 days a week  [X] Has attainable rehab goals with an appropriate initial discharge plan  [X] Has rehabilitation potential (expected to make a significant improvement within a reasonable period of time)  [X] Requires close medical management by a rehab physician, rehab nursing care,  and comprehensive interdisciplinary team (including PT, OT)    [X] Requires evaluation by a physiatrist at least 3 days a week to evaluate and manage and coordinate rehab and medical problems   ASSESSMENT/PLAN    Rehab of multitrauma after fall with resulting displaced C1 vertebrae, c2 odontoid fracture, and scalp hematoma with significant decline in function    #C1 vertebra fracture, type II dens fracture  - Pain: Tylenol PRN  - [place] J collar in place  - Neurosurgery consult: no neurosurgical intervention, f/u w/ Dr. Pulido in 2 weeks outpatient  - CTA: The vertebral arteries are patent, without evidence of   vascular occlusion, extravasation, or dissection. mild compression upon the right vertebral artery by the inferior corner of the lateral mass of C1, which is mildly displaced laterally  - continue to monitor neuro status closely   - c/w pt and ot     #Hypertension   - c/w home losartan 50mg daily   - inpt bp goal <140/90    #Osteoporosis  - on home aldredonate   - not available in hospital       -Pain control: tylenol PRN    -GI/Bowel Mgmt: n/i    -Bladder management: senna PRN      -Skin: No active issues at this time     -FEN: f/u     - Diet: regular diet       Precautions / PROPHYLAXIS:    - Falls  - Ortho: Weight bearing status: wbat  - DVT prophylaxis: heparin subq  - HARD OF HEARING    MEDICAL PROGNOSIS: GOOD            REHAB POTENTIAL: GOOD             ESTIMATED DISPOSITION: HOME WITH HOME CARE       [ x ]  The goals of the IRF admission were discussed with the patient and or family member, who agreed             ELOS:  [   x   ] 7-14    [    ]  14-21    [    ]  Other    THERAPY ORDERS and INITIAL INDIVIDUALIZED PLAN OF CARE:  This initial individualized interdisciplinary plan of care, which was established by me (the attending physiatrist), is based on elements from the post admission evaluation. The interdisciplinary therapy program is to be at least 3 hrs a day, at least 5 days per week from from physical, occupational and/ or speech therapies as ordered by me below.      [ x  ] P.T. 90 mins. /day at least 5 out of 7 days:  [  x ] superficial  modalities prn, [ x  ] A/AAROM, [ x  ] PREs, [ x  ] transfer training,            [ x  ] progressive ambulation, [x   ] stairs                                               [ x  ] O.T. 90 mins. /day at least 5 out of 7 days::  [ x  ] modalities prn,  [ x  ]A/AAROM, [ x  ] PREs, [  x ] functional transfer training, [ x  ] ADL training           [   ] cognitive/ perceptual eval and training, [   ] splint eval                                                  [   ] S.L.P:  [   ] speech eval, [   ] swallow eval     [   ] Neuropsychology eval     [ x  ] Individualized rec. therapy      RATIONALE FOR INPATIENT ADMISSION - Patient demonstrates the following: (check all that apply)  [X] Medically appropriate for acute rehabilitation admission. Requires interdisiplinary therapy consisting of at least PT and OT, at least 3 hrs. a day at least 5 days a week  [X] Has attainable rehab goals with an appropriate initial discharge plan  [X] Has rehabilitation potential (expected to make a significant improvement within a reasonable period of time)  [X] Requires close medical management by a rehab physician, rehab nursing care,  and comprehensive interdisciplinary team (including PT, OT)    [X] Requires evaluation by a physiatrist at least 3 days a week to evaluate and manage and coordinate rehab and medical problems   ASSESSMENT/PLAN    Rehab of multitrauma after fall with resulting displaced C1 vertebrae, c2 odontoid fracture, and scalp hematoma/ laceration with significant decline in function    #C1/ C2 vertebra fracture, type II dens fracture   - Pain: Tylenol PRN  - [place] J collar in place  - Neurosurgery consult: no neurosurgical intervention, f/u w/ Dr. Pulido in 2 weeks outpatient  - CTA: The vertebral arteries are patent, without evidence of   vascular occlusion, extravasation, or dissection. mild compression upon the right vertebral artery by the inferior corner of the lateral mass of C1, which is mildly displaced laterally  - continue to monitor neuro status closely   - c/w pt and ot     #Head Trauma-  - forhead hematoma/ laceration/ echymosis  - no intracranial trauma, LOC or evidence of concussion syndrome  - monitor    #Hypertension   - c/w home losartan 50mg daily   - inpt bp goal <140/90    #Osteoporosis  - on home aldredonate   - not available in hospital       -Pain control: tylenol PRN     -Skin: monitor laceration/ contusion    -FEN: f/u     - Diet: DASH      Precautions / PROPHYLAXIS:    - HARD CERVICAL COLLAR AT ALL TIMES    - Falls  - Ortho: Weight bearing status: wbat  - DVT prophylaxis: heparin subq  - HARD OF HEARING    MEDICAL PROGNOSIS: GOOD            REHAB POTENTIAL: GOOD             ESTIMATED DISPOSITION: HOME WITH HOME CARE       [ x ]  The goals of the IRF admission were discussed with the patient and family member, who agreed             ELOS:  [   x   ] 7-14    [    ]  14-21    [    ]  Other    THERAPY ORDERS and INITIAL INDIVIDUALIZED PLAN OF CARE:  This initial individualized interdisciplinary plan of care, which was established by me (the attending physiatrist), is based on elements from the post admission evaluation. The interdisciplinary therapy program is to be at least 3 hrs a day, at least 5 days per week from from physical, occupational and/ or speech therapies as ordered by me below.      [ x  ] P.T. 90 mins. /day at least 5 out of 7 days:  [  x ] superficial  modalities prn, [ x  ] A/AAROM, [ x  ] PREs, [ x  ] transfer training,            [ x  ] progressive ambulation, [x   ] stairs                                               [ x  ] O.T. 90 mins. /day at least 5 out of 7 days::  [ x  ] modalities prn,  [ x  ]A/AAROM, [ x  ] PREs, [  x ] functional transfer training, [ x  ] ADL training           [ x  ] cognitive/ perceptual eval and training, [   ] splint eval                                                  [   ] S.L.P:  [   ] speech eval, [   ] swallow eval     [   ] Neuropsychology eval     [ x  ] Individualized rec. therapy      RATIONALE FOR INPATIENT ADMISSION - Patient demonstrates the following: (check all that apply)  [X] Medically appropriate for acute rehabilitation admission. Requires interdisiplinary therapy consisting of at least PT and OT, at least 3 hrs. a day at least 5 days a week  [X] Has attainable rehab goals with an appropriate initial discharge plan  [X] Has rehabilitation potential (expected to make a significant improvement within a reasonable period of time)  [X] Requires close medical management by a rehab physician, rehab nursing care,  and comprehensive interdisciplinary team (including PT, OT)    [X] Requires evaluation by a physiatrist at least 3 days a week to evaluate and manage and coordinate rehab and medical problems

## 2022-07-22 NOTE — H&P ADULT - NSHPLABSRESULTS_GEN_ALL_CORE
14.6   15.26 )-----------( 283      ( 22 Jul 2022 13:10 )             43.8     07-22    140  |  100  |  23<H>  ----------------------------<  233<H>  4.6   |  28  |  0.6<L>    Ca    9.4      22 Jul 2022 13:10  Phos  3.7     07-22  Mg     2.1     07-22      PT/INR - ( 22 Jul 2022 13:10 )   PT: 12.20 sec;   INR: 1.06 ratio         PTT - ( 22 Jul 2022 13:10 )  PTT:28.0 sec    POCT Blood Glucose.: 124 mg/dL (07-21-22 @ 06:55)  POCT Blood Glucose.: 132 mg/dL (07-21-22 @ 02:43)    07/20 CT head:   CT HEAD:  Midline supraorbital scalp hematoma without underlying calvarial fracture   or intracranial hemorrhage.    Mild chronic microvascular type changes.    Unchanged appearance of a small partially calcified extra-axial mass   overlying the right frontal lobe possibly representing a meningioma.    CT CERVICAL SPINE:  Multiple acute displaced fractures involving the C1 vertebra as well as a   type II dens fracture with minimal posterior angulation of the superior   fracture fragment.    1. The vertebral arteries are codominant and patent, without evidence of   vascular occlusion, extravasation, or dissection. On coronal image   (602/127) there is mild compression upon the right vertebral artery by   the inferior corner of the lateral mass of C1, which is mildly displaced   laterally.  2. Multiple acute displaced fractures involving the C1 vertebrae and type   II dens fracture with minimal displacement. Findings better detailed on   CT cervical spine performed same day, earlier.  3. Bilateral tree-in-bud nodularity, overall decreased in size from   11/14/2020. Recommend continued imaging for follow-up to resolution.  POCT Blood Glucose.: 122 mg/dL (07-21-22 @ 01:12)

## 2022-07-22 NOTE — H&P ADULT - NSHPSOCIALHISTORY_GEN_ALL_CORE
Patient denies smoking history. Endorses social drinker. Lives alone with two steps to enter. Only family is her son who lives in [place] (Was at bedside) who endorses he has been trying to get her to move in with him. Patient denies smoking history. Endorses social drinker. Lives alone with two steps to enter. Only family is her son who lives in [place] (Was at bedside) who endorses he has been trying to get her to move in with him. SHASHI vela

## 2022-07-22 NOTE — DISCHARGE NOTE NURSING/CASE MANAGEMENT/SOCIAL WORK - PATIENT PORTAL LINK FT
You can access the FollowMyHealth Patient Portal offered by [facility] by registering at the following website: [email or web address]. By joining [facility]’s FollowMyHealth portal, you will also be able to view your health information using other applications (apps) compatible with our system.

## 2022-07-22 NOTE — H&P ADULT - ATTENDING COMMENTS
Patient examined and discussed with resident. Patient is s/p fall, possible loss of balance while standing with head trauma but no LOC or concussion syndrome. Also had fall in February from LOB. Found to have C1 and C2 dens - type 2 fractures. No intervention as per NSGY. Must wear hard cervical collar all times. Also has large forehead hematoma and laceration with facial echymoses.   Now, requires min assistance for transfers andCG for ambulation and min/ mod assist for ADL. She was living alone and fully independent PTA.   She is medically stable and a good acute rehab candidate for her significant decline in mobility and ADL function. Her therapy needs to be carefully monitored for decompensation given her C1/ C2 fractures.     I read, edited and agree with the Assessment:  #C1/ C2 vertebra fracture, type II dens fracture   - Pain: Tylenol PRN  - [place] J collar in place  - Neurosurgery consult: no neurosurgical intervention, f/u w/ Dr. Pulido in 2 weeks outpatient  - CTA: The vertebral arteries are patent, without evidence of   vascular occlusion, extravasation, or dissection. mild compression upon the right vertebral artery by the inferior corner of the lateral mass of C1, which is mildly displaced laterally  - continue to monitor neuro status closely   - c/w pt and ot     #Head Trauma-  - forhead hematoma/ laceration/ echymosis  - no intracranial trauma, LOC or evidence of concussion syndrome  - monitor    #Hypertension   - c/w home losartan 50mg daily   - inpt bp goal <140/90    #Osteoporosis  - on home aldredonate   - not available in hospital       -Pain control: tylenol PRN     -Skin: monitor laceration/ contusion    -FEN: f/u     - Diet: DASH      Precautions / PROPHYLAXIS:    - HARD CERVICAL COLLAR AT ALL TIMES    - Falls  - Ortho: Weight bearing status: wbat  - DVT prophylaxis: heparin subq  - HARD OF HEARING

## 2022-07-23 LAB
A1C WITH ESTIMATED AVERAGE GLUCOSE RESULT: 6 % — HIGH (ref 4–5.6)
ALBUMIN SERPL ELPH-MCNC: 3.5 G/DL — SIGNIFICANT CHANGE UP (ref 3.5–5.2)
ALP SERPL-CCNC: 63 U/L — SIGNIFICANT CHANGE UP (ref 30–115)
ALT FLD-CCNC: 9 U/L — SIGNIFICANT CHANGE UP (ref 0–41)
ANION GAP SERPL CALC-SCNC: 15 MMOL/L — HIGH (ref 7–14)
AST SERPL-CCNC: 14 U/L — SIGNIFICANT CHANGE UP (ref 0–41)
BASOPHILS # BLD AUTO: 0.03 K/UL — SIGNIFICANT CHANGE UP (ref 0–0.2)
BASOPHILS NFR BLD AUTO: 0.2 % — SIGNIFICANT CHANGE UP (ref 0–1)
BILIRUB SERPL-MCNC: 0.5 MG/DL — SIGNIFICANT CHANGE UP (ref 0.2–1.2)
BUN SERPL-MCNC: 25 MG/DL — HIGH (ref 10–20)
CALCIUM SERPL-MCNC: 8.8 MG/DL — SIGNIFICANT CHANGE UP (ref 8.5–10.1)
CHLORIDE SERPL-SCNC: 101 MMOL/L — SIGNIFICANT CHANGE UP (ref 98–110)
CO2 SERPL-SCNC: 25 MMOL/L — SIGNIFICANT CHANGE UP (ref 17–32)
CREAT SERPL-MCNC: 0.5 MG/DL — LOW (ref 0.7–1.5)
EGFR: 87 ML/MIN/1.73M2 — SIGNIFICANT CHANGE UP
EOSINOPHIL # BLD AUTO: 0.06 K/UL — SIGNIFICANT CHANGE UP (ref 0–0.7)
EOSINOPHIL NFR BLD AUTO: 0.4 % — SIGNIFICANT CHANGE UP (ref 0–8)
ESTIMATED AVERAGE GLUCOSE: 126 MG/DL — HIGH (ref 68–114)
GLUCOSE SERPL-MCNC: 83 MG/DL — SIGNIFICANT CHANGE UP (ref 70–99)
HCT VFR BLD CALC: 39.7 % — SIGNIFICANT CHANGE UP (ref 37–47)
HGB BLD-MCNC: 13.3 G/DL — SIGNIFICANT CHANGE UP (ref 12–16)
IMM GRANULOCYTES NFR BLD AUTO: 0.4 % — HIGH (ref 0.1–0.3)
LYMPHOCYTES # BLD AUTO: 1.71 K/UL — SIGNIFICANT CHANGE UP (ref 1.2–3.4)
LYMPHOCYTES # BLD AUTO: 12.7 % — LOW (ref 20.5–51.1)
MAGNESIUM SERPL-MCNC: 2.1 MG/DL — SIGNIFICANT CHANGE UP (ref 1.8–2.4)
MCHC RBC-ENTMCNC: 29.5 PG — SIGNIFICANT CHANGE UP (ref 27–31)
MCHC RBC-ENTMCNC: 33.5 G/DL — SIGNIFICANT CHANGE UP (ref 32–37)
MCV RBC AUTO: 88 FL — SIGNIFICANT CHANGE UP (ref 81–99)
MONOCYTES # BLD AUTO: 1.9 K/UL — HIGH (ref 0.1–0.6)
MONOCYTES NFR BLD AUTO: 14.1 % — HIGH (ref 1.7–9.3)
NEUTROPHILS # BLD AUTO: 9.7 K/UL — HIGH (ref 1.4–6.5)
NEUTROPHILS NFR BLD AUTO: 72.2 % — SIGNIFICANT CHANGE UP (ref 42.2–75.2)
NRBC # BLD: 0 /100 WBCS — SIGNIFICANT CHANGE UP (ref 0–0)
PLATELET # BLD AUTO: 255 K/UL — SIGNIFICANT CHANGE UP (ref 130–400)
POTASSIUM SERPL-MCNC: 4 MMOL/L — SIGNIFICANT CHANGE UP (ref 3.5–5)
POTASSIUM SERPL-SCNC: 4 MMOL/L — SIGNIFICANT CHANGE UP (ref 3.5–5)
PROT SERPL-MCNC: 5.8 G/DL — LOW (ref 6–8)
RBC # BLD: 4.51 M/UL — SIGNIFICANT CHANGE UP (ref 4.2–5.4)
RBC # FLD: 13.9 % — SIGNIFICANT CHANGE UP (ref 11.5–14.5)
SODIUM SERPL-SCNC: 141 MMOL/L — SIGNIFICANT CHANGE UP (ref 135–146)
WBC # BLD: 13.45 K/UL — HIGH (ref 4.8–10.8)
WBC # FLD AUTO: 13.45 K/UL — HIGH (ref 4.8–10.8)

## 2022-07-23 PROCEDURE — 93010 ELECTROCARDIOGRAM REPORT: CPT

## 2022-07-23 RX ADMIN — Medication 650 MILLIGRAM(S): at 12:30

## 2022-07-23 RX ADMIN — Medication 650 MILLIGRAM(S): at 11:46

## 2022-07-23 RX ADMIN — HEPARIN SODIUM 5000 UNIT(S): 5000 INJECTION INTRAVENOUS; SUBCUTANEOUS at 16:01

## 2022-07-23 RX ADMIN — HEPARIN SODIUM 5000 UNIT(S): 5000 INJECTION INTRAVENOUS; SUBCUTANEOUS at 05:39

## 2022-07-23 RX ADMIN — LOSARTAN POTASSIUM 50 MILLIGRAM(S): 100 TABLET, FILM COATED ORAL at 05:38

## 2022-07-23 RX ADMIN — HEPARIN SODIUM 5000 UNIT(S): 5000 INJECTION INTRAVENOUS; SUBCUTANEOUS at 21:22

## 2022-07-23 NOTE — PROGRESS NOTE ADULT - SUBJECTIVE AND OBJECTIVE BOX
T(C): 37.1 (07-23-22 @ 05:31), Max: 37.1 (07-22-22 @ 20:00)  HR: 64 (07-23-22 @ 05:31) (64 - 93)  BP: 144/69 (07-23-22 @ 05:31) (125/60 - 158/79)  RR: 18 (07-23-22 @ 05:31) (18 - 20)  SpO2: 94% (07-22-22 @ 22:48) (94% - 98%)      Patient was stable overnight and expresses no new complaints     PE:    Alert   LUNGS- clear  COR- RRR  ABD- SOFT, NT  EXTR- w/o edema  NEURO- stable    07-23    141  |  101  |  25<H>  ----------------------------<  83  4.0   |  25  |  0.5<L>    Ca    8.8      23 Jul 2022 05:32  Phos  3.7     07-22  Mg     2.1     07-23    TPro  5.8<L>  /  Alb  3.5  /  TBili  0.5  /  DBili  x   /  AST  14  /  ALT  9   /  AlkPhos  63  07-23                            13.3   13.45 )-----------( 255      ( 23 Jul 2022 05:32 )             39.7       INR: 1.06 ratio (07-22-22 @ 13:10)

## 2022-07-24 RX ADMIN — LOSARTAN POTASSIUM 50 MILLIGRAM(S): 100 TABLET, FILM COATED ORAL at 06:30

## 2022-07-24 RX ADMIN — HEPARIN SODIUM 5000 UNIT(S): 5000 INJECTION INTRAVENOUS; SUBCUTANEOUS at 06:31

## 2022-07-24 RX ADMIN — HEPARIN SODIUM 5000 UNIT(S): 5000 INJECTION INTRAVENOUS; SUBCUTANEOUS at 21:56

## 2022-07-24 RX ADMIN — HEPARIN SODIUM 5000 UNIT(S): 5000 INJECTION INTRAVENOUS; SUBCUTANEOUS at 14:55

## 2022-07-24 NOTE — PROGRESS NOTE ADULT - SUBJECTIVE AND OBJECTIVE BOX
T(C): 36.5 (07-24-22 @ 06:39), Max: 36.9 (07-23-22 @ 12:23)  HR: 84 (07-24-22 @ 06:39) (81 - 96)  BP: 149/68 (07-24-22 @ 06:39) (116/56 - 149/68)  RR: 20 (07-24-22 @ 06:39) (18 - 20)  SpO2: --      Patient was stable overnight and expresses no new complaints     PE:    Alert   LUNGS- clear  COR- RRR  ABD- SOFT, NT  EXTR- w/o edema  NEURO- stable    07-23    141  |  101  |  25<H>  ----------------------------<  83  4.0   |  25  |  0.5<L>    Ca    8.8      23 Jul 2022 05:32  Phos  3.7     07-22  Mg     2.1     07-23    TPro  5.8<L>  /  Alb  3.5  /  TBili  0.5  /  DBili  x   /  AST  14  /  ALT  9   /  AlkPhos  63  07-23                            13.3   13.45 )-----------( 255      ( 23 Jul 2022 05:32 )             39.7

## 2022-07-25 LAB
ALBUMIN SERPL ELPH-MCNC: 3.6 G/DL — SIGNIFICANT CHANGE UP (ref 3.5–5.2)
ALP SERPL-CCNC: 79 U/L — SIGNIFICANT CHANGE UP (ref 30–115)
ALT FLD-CCNC: 11 U/L — SIGNIFICANT CHANGE UP (ref 0–41)
ANION GAP SERPL CALC-SCNC: 13 MMOL/L — SIGNIFICANT CHANGE UP (ref 7–14)
AST SERPL-CCNC: 14 U/L — SIGNIFICANT CHANGE UP (ref 0–41)
BASOPHILS # BLD AUTO: 0.05 K/UL — SIGNIFICANT CHANGE UP (ref 0–0.2)
BASOPHILS NFR BLD AUTO: 0.4 % — SIGNIFICANT CHANGE UP (ref 0–1)
BILIRUB SERPL-MCNC: 0.5 MG/DL — SIGNIFICANT CHANGE UP (ref 0.2–1.2)
BUN SERPL-MCNC: 25 MG/DL — HIGH (ref 10–20)
CALCIUM SERPL-MCNC: 8.8 MG/DL — SIGNIFICANT CHANGE UP (ref 8.5–10.1)
CHLORIDE SERPL-SCNC: 102 MMOL/L — SIGNIFICANT CHANGE UP (ref 98–110)
CO2 SERPL-SCNC: 27 MMOL/L — SIGNIFICANT CHANGE UP (ref 17–32)
CREAT SERPL-MCNC: 0.5 MG/DL — LOW (ref 0.7–1.5)
EGFR: 87 ML/MIN/1.73M2 — SIGNIFICANT CHANGE UP
EOSINOPHIL # BLD AUTO: 0.11 K/UL — SIGNIFICANT CHANGE UP (ref 0–0.7)
EOSINOPHIL NFR BLD AUTO: 0.9 % — SIGNIFICANT CHANGE UP (ref 0–8)
GLUCOSE SERPL-MCNC: 123 MG/DL — HIGH (ref 70–99)
HCT VFR BLD CALC: 42.5 % — SIGNIFICANT CHANGE UP (ref 37–47)
HGB BLD-MCNC: 13.7 G/DL — SIGNIFICANT CHANGE UP (ref 12–16)
IMM GRANULOCYTES NFR BLD AUTO: 0.4 % — HIGH (ref 0.1–0.3)
LYMPHOCYTES # BLD AUTO: 17.4 % — LOW (ref 20.5–51.1)
LYMPHOCYTES # BLD AUTO: 2.16 K/UL — SIGNIFICANT CHANGE UP (ref 1.2–3.4)
MAGNESIUM SERPL-MCNC: 2.1 MG/DL — SIGNIFICANT CHANGE UP (ref 1.8–2.4)
MCHC RBC-ENTMCNC: 29.1 PG — SIGNIFICANT CHANGE UP (ref 27–31)
MCHC RBC-ENTMCNC: 32.2 G/DL — SIGNIFICANT CHANGE UP (ref 32–37)
MCV RBC AUTO: 90.2 FL — SIGNIFICANT CHANGE UP (ref 81–99)
MONOCYTES # BLD AUTO: 1.5 K/UL — HIGH (ref 0.1–0.6)
MONOCYTES NFR BLD AUTO: 12.1 % — HIGH (ref 1.7–9.3)
NEUTROPHILS # BLD AUTO: 8.52 K/UL — HIGH (ref 1.4–6.5)
NEUTROPHILS NFR BLD AUTO: 68.8 % — SIGNIFICANT CHANGE UP (ref 42.2–75.2)
NRBC # BLD: 0 /100 WBCS — SIGNIFICANT CHANGE UP (ref 0–0)
PLATELET # BLD AUTO: 293 K/UL — SIGNIFICANT CHANGE UP (ref 130–400)
POTASSIUM SERPL-MCNC: 4.2 MMOL/L — SIGNIFICANT CHANGE UP (ref 3.5–5)
POTASSIUM SERPL-SCNC: 4.2 MMOL/L — SIGNIFICANT CHANGE UP (ref 3.5–5)
PROT SERPL-MCNC: 6.2 G/DL — SIGNIFICANT CHANGE UP (ref 6–8)
RBC # BLD: 4.71 M/UL — SIGNIFICANT CHANGE UP (ref 4.2–5.4)
RBC # FLD: 14 % — SIGNIFICANT CHANGE UP (ref 11.5–14.5)
SODIUM SERPL-SCNC: 142 MMOL/L — SIGNIFICANT CHANGE UP (ref 135–146)
WBC # BLD: 12.39 K/UL — HIGH (ref 4.8–10.8)
WBC # FLD AUTO: 12.39 K/UL — HIGH (ref 4.8–10.8)

## 2022-07-25 RX ADMIN — HEPARIN SODIUM 5000 UNIT(S): 5000 INJECTION INTRAVENOUS; SUBCUTANEOUS at 13:05

## 2022-07-25 RX ADMIN — HEPARIN SODIUM 5000 UNIT(S): 5000 INJECTION INTRAVENOUS; SUBCUTANEOUS at 06:46

## 2022-07-25 RX ADMIN — HEPARIN SODIUM 5000 UNIT(S): 5000 INJECTION INTRAVENOUS; SUBCUTANEOUS at 21:38

## 2022-07-25 RX ADMIN — LOSARTAN POTASSIUM 50 MILLIGRAM(S): 100 TABLET, FILM COATED ORAL at 06:46

## 2022-07-25 NOTE — PROGRESS NOTE ADULT - ASSESSMENT
ASSESSMENT/PLAN    Rehab of multitrauma after fall with resulting displaced C1 vertebrae, c2 odontoid fracture, and scalp hematoma/ laceration with significant decline in function    #C1/ C2 vertebra fracture, type II dens fracture   - Pain: Tylenol PRN  - [place] J collar in place  - Neurosurgery consult: no neurosurgical intervention, f/u w/ Dr. Pulido in 2 weeks outpatient  - CTA: The vertebral arteries are patent, without evidence of   vascular occlusion, extravasation, or dissection. mild compression upon the right vertebral artery by the inferior corner of the lateral mass of C1, which is mildly displaced laterally  - continue to monitor neuro status closely   - c/w pt and ot; participating well     #Head Trauma-  - forhead hematoma/ laceration/ echymosis  - no intracranial trauma, LOC or evidence of concussion syndrome  - monitor    #Hypertension   - c/w home losartan 50mg daily   - inpt bp goal <140/90    #Osteoporosis  - on home aldredonate   - not available in hospital    #Leukocytosis   - likely reactive 2/2 to trauma  - downtrending; c/w weekly cbc      -Pain control: tylenol PRN     -Skin: monitor laceration/ contusion    -FEN: f/u     - Diet: DASH      Precautions / PROPHYLAXIS:    - HARD CERVICAL COLLAR AT ALL TIMES    - Falls  - Ortho: Weight bearing status: wbat  - DVT prophylaxis: heparin subq  - HARD OF HEARING    RATIONALE FOR INPATIENT ADMISSION - Patient demonstrates the following: (check all that apply)  [X] Medically appropriate for acute rehabilitation admission. Requires interdisiplinary therapy consisting of at least PT and OT, at least 3 hrs. a day at least 5 days a week  [X] Has attainable rehab goals with an appropriate initial discharge plan  [X] Has rehabilitation potential (expected to make a significant improvement within a reasonable period of time)  [X] Requires close medical management by a rehab physician, rehab nursing care,  and comprehensive interdisciplinary team (including PT, OT)    [X] Requires evaluation by a physiatrist at least 3 days a week to evaluate and manage and coordinate rehab and medical problems   ASSESSMENT/PLAN    Rehab of multitrauma after fall with resulting displaced C1 vertebrae, c2 odontoid fracture, and scalp hematoma/ laceration with significant decline in function    #C1/ C2 vertebra fracture, type II dens fracture   - Pain: Tylenol PRN  - [place] J collar in place  - Neurosurgery consult: no neurosurgical intervention, f/u w/ Dr. Pulido in 2 weeks outpatient  - CTA: The vertebral arteries are patent, without evidence of   vascular occlusion, extravasation, or dissection. mild compression upon the right vertebral artery by the inferior corner of the lateral mass of C1, which is mildly displaced laterally  - continue to monitor neuro status closely   - c/w pt and ot; participating well     #Head Trauma-  - forhead hematoma/ laceration/ echymosis  - no intracranial trauma, LOC or evidence of concussion syndrome  - monitor    #Hypertension   - c/w home losartan 50mg daily   - inpt bp goal <140/90    #Osteoporosis  - on home aldredonate   - not available in hospital    #Leukocytosis   - likely reactive 2/2 to trauma  - downtrending; c/w weekly cbc      -Pain control: tylenol PRN     -Skin: monitor laceration/ contusion    -FEN: labs stable.     - Diet: DASH

## 2022-07-25 NOTE — PROGRESS NOTE ADULT - SUBJECTIVE AND OBJECTIVE BOX
Patient is a 89y old  Female who presents with a chief complaint of rehabilitation of multitrauma after fall with resulting displaced C1 vertebrae, c2 odontoid fracture, and scalp hematoma with significant decline in function (24 Jul 2022 11:32)      HPI:  This is a 89yo PMH of HTN, aortic aneurysm who presented as a trauma after a fall. Patient endorsed she was taking her blood pressure medication and then fell forward and hit her head on the counter. No LOC, and no AC. She did not fall to the ground, and was able to ambulate. She had associated head and neck pain that was non-radiating. Patient sought medical attention at [facility] for a forehead laceration that was actively bleeding which she sustained after the trauma. Imaging was done revealed multiple traumas including multiple acute displaced fractures of C1 vertebrae with mild compression of right vertebral artery, and C2 type II odontoid fracture with angulation of superior fracture fragment, supraorbital scalp hematoma. Patient was transferred to [facility], [place] and admitted to SICU.  Neurosurgery was consulted, and no surgical intervention was recommended; just to wear cervical [place] J collar at all times. Of note also patient had a fall previous to this in Feb 2022, after which went for rehab and then required a cane and walker for ambulation but still was independent with ADLs; prior to that fall she was independent. Patient was evaluated by physical therapy and occupational and was found to be CG RW 30',  transfer min assist, bed mobility mod assist, bathing max assist, upper body and lower body dressing mod assist, toilet hygiene mod assist, and grooming min assist. Patient was evaluated by physiatry and was found to be a good candidate for acute rehab given her multitrauma with significant injuries can benefit from a physiatrists supervision, and with significant decline in function from baseline can benefit from multidisciplinary therapy 3hours per day for a minimum of 5 days of week.  Patient denied chest pain, shortness of breath, dizziness, paresthesias, new weakness, memory changes, and changes in urinary and bowel habits.        I examined the patient and reviewed the chart. There have been no significant changes since my history and physical except where documented below.    TODAY'S SUBJECTIVE & REVIEW OF SYMPTOMS:  Patient seen and examined at bedside this AM. Patient was doing exercises prescribed by PT, and in good spirits. No patient complaints. no acute overnight events or events this weekend. VS reviewed and labs reviewed.        Constitutional    [x   ] WNL           [   ] poor appetite   [   ] insomnia   [   ] tired   Cardio:                [ x  ] WNL           [   ] CP   [   ] HINDS   [   ] palpitations               Resp:                   [ x  ] WNL           [   ] SOB   [   ] cough   [   ] wheezing   GI:                        [ x  ] WNL           [   ] constipation   [   ] diarrhea   [   ] abdominal pain   [   ] nausea   [   ] emesis                                :                      [ x  ] WNL           [   ] DYKES  [   ] dysuria   [   ] difficulty voiding             Endo:                   [ x  ] WNL          [   ] polyuria   [   ] temperature intolerance                 Skin:                     [   ] WNL          [   ] pain   [ x  ] wound   [   ] rash   MSK:                    [x   ] WNL          [   ] muscle pain   [   ] joint pain/ stiffness   [   ] muscle tenderness   [   ] swelling   Neuro:                 [ x  ] WNL          [   ] HA   [   ] change in vision   [   ] tremor   [   ] weakness   [   ]dysphagia              Cognitive:           [   x] WNL           [   ]confusion      Psych:                  [ x  ] WNL           [   ] hallucinations   [   ]agitation   [   ] delusion   [   ]depression      PHYSICAL EXAM    Vital Signs Last 24 Hrs  T(C): 36.3 (25 Jul 2022 05:48), Max: 36.9 (24 Jul 2022 21:04)  T(F): 97.4 (25 Jul 2022 05:48), Max: 98.5 (24 Jul 2022 21:04)  HR: 81 (25 Jul 2022 05:48) (81 - 94)  BP: 129/66 (25 Jul 2022 05:48) (126/64 - 137/63)  BP(mean): 90 (25 Jul 2022 05:48) (89 - 90)  RR: 18 (25 Jul 2022 05:48) (18 - 18)  SpO2: --    General:[  x ] NAD, Resting Comfortable,   [   ] other: pleasant                               HEENT: [   ] NC/AT, EOMI, PERRL , Normal Conjunctivae,   [x   ] other:  left forehead with large hematoma and echymosis,  laceration above left eye covered by steristrip. b/l raccoon eyes.   Cardio: [x   ] RRR, no murmur,   [   ] other:                              Pulm: [ x  ] No Respiratory Distress,  Lungs CTAB,   [   ] other:                       Abdomen: [ x  ]ND/NT, Soft,   [   ] other:    : [  x ] NO DYKES CATHETER, [   ] DYKES CATHETER- no meatal tear, no discharge, [   ] other:                                            MSK: [ x  ] No joint swelling, Full ROM,   [  x ] other:  ulnar deviation of fingers b/l                                       Ext: [   x]No C/C/E, No calf tenderness,   [   ]other:    Skin: [  x ]intact,   [ x  ] other:  1/2 inch diameter mole on right hand                                                                   Neurological Examination:  Cognitive: [ x   ] AAO x 3,   [    ]  other:                                                                      Attention:  [ x   ] intact,   [    ]  other: 3/3 word recall                             Memory: [  x  ] intact,    [    ]  other:     Mood/Affect: [  x  ] wnl,    [    ]  other:                                                                             Communication: [  x  ]Fluent, no dysarthria, following commands:  [    ] other:   CN II - XII:  [   x ] intact,  [    ] other:                                                                                        Motor:   RIGHT UE: [   ] WNL,  [   ] other: 4/5   LEFT    UE: [   ] WNL,  [   ] other: 4/5  RIGHT LE: [   ] WNL,  [   ] other: 4/5  LEFT    LE: [   ] WNL,  [   ] other: 4/5  2+/5 b/l shoulders   Tone: [   x ] wnl,   [    ]  other:  DTRs: [x   ]symmetric, [   ] other:  Coordination:   [ x   ] intact,   [    ] other:                                                               Sensory: [ x   ] Intact to light touch,   [    ] other:      MEDICATIONS  (STANDING):  heparin   Injectable 5000 Unit(s) SubCutaneous every 8 hours  losartan 50 milliGRAM(s) Oral daily    MEDICATIONS  (PRN):  acetaminophen     Tablet .. 650 milliGRAM(s) Oral every 6 hours PRN Severe Pain (7 - 10)  melatonin 3 milliGRAM(s) Oral at bedtime PRN Insomnia  senna 2 Tablet(s) Oral at bedtime PRN Constipation        RECENT LABS/IMAGING                        13.7   12.39 )-----------( 293      ( 25 Jul 2022 08:15 )             42.5     07-25    142  |  102  |  25<H>  ----------------------------<  123<H>  4.2   |  27  |  0.5<L>    Ca    8.8      25 Jul 2022 08:15  Mg     2.1     07-25    TPro  6.2  /  Alb  3.6  /  TBili  0.5  /  DBili  x   /  AST  14  /  ALT  11  /  AlkPhos  79  07-25     Patient is a 89y old  Female who presents with a chief complaint of rehabilitation of multitrauma after fall with resulting displaced C1 vertebrae, c2 odontoid fracture, and scalp hematoma with significant decline in function (24 Jul 2022 11:32)      HPI:  This is a 89yo PMH of HTN, aortic aneurysm who presented as a trauma after a fall. Patient endorsed she was taking her blood pressure medication and then fell forward and hit her head on the counter. No LOC, and no AC. She did not fall to the ground, and was able to ambulate. She had associated head and neck pain that was non-radiating. Patient sought medical attention at [facility] for a forehead laceration that was actively bleeding which she sustained after the trauma. Imaging was done revealed multiple traumas including multiple acute displaced fractures of C1 vertebrae with mild compression of right vertebral artery, and C2 type II odontoid fracture with angulation of superior fracture fragment, supraorbital scalp hematoma. Patient was transferred to [facility] and admitted to SICU.  Neurosurgery was consulted, and no surgical intervention was recommended; just to wear cervical [place] J collar at all times. Of note also patient had a fall previous to this in Feb 2022, after which went for rehab and then required a cane and walker for ambulation but still was independent with ADLs; prior to that fall she was independent. Patient was evaluated by physical therapy and occupational and was found to be CG RW 30',  transfer min assist, bed mobility mod assist, bathing max assist, upper body and lower body dressing mod assist, toilet hygiene mod assist, and grooming min assist. Patient was evaluated by physiatry and was found to be a good candidate for acute rehab given her multitrauma with significant injuries can benefit from a physiatrists supervision, and with significant decline in function from baseline can benefit from multidisciplinary therapy 3hours per day for a minimum of 5 days of week.  Patient denied chest pain, shortness of breath, dizziness, paresthesias, new weakness, memory changes, and changes in urinary and bowel habits.        TODAY'S SUBJECTIVE & REVIEW OF SYMPTOMS:  Patient seen and examined at bedside this AM. Patient was doing exercises prescribed by PT, and in good spirits. No patient complaints. no acute overnight events or events this weekend. VS reviewed and labs reviewed.        Constitutional    [x   ] WNL           [   ] poor appetite   [   ] insomnia   [   ] tired   Cardio:                [ x  ] WNL           [   ] CP   [   ] HINDS   [   ] palpitations               Resp:                   [ x  ] WNL           [   ] SOB   [   ] cough   [   ] wheezing   GI:                        [ x  ] WNL           [   ] constipation   [   ] diarrhea   [   ] abdominal pain   [   ] nausea   [   ] emesis                                :                      [ x  ] WNL           [   ] DYKES  [   ] dysuria   [   ] difficulty voiding             Endo:                   [ x  ] WNL          [   ] polyuria   [   ] temperature intolerance                 Skin:                     [   ] WNL          [   ] pain   [ x  ] wound   [   ] rash   MSK:                    [x   ] WNL          [   ] muscle pain   [   ] joint pain/ stiffness   [   ] muscle tenderness   [   ] swelling   Neuro:                 [ x  ] WNL          [   ] HA   [   ] change in vision   [   ] tremor   [   ] weakness   [   ]dysphagia              Cognitive:           [   x] WNL           [   ]confusion      Psych:                  [ x  ] WNL           [   ] hallucinations   [   ]agitation   [   ] delusion   [   ]depression      PHYSICAL EXAM    Vital Signs Last 24 Hrs  T(C): 36.3 (25 Jul 2022 05:48), Max: 36.9 (24 Jul 2022 21:04)  T(F): 97.4 (25 Jul 2022 05:48), Max: 98.5 (24 Jul 2022 21:04)  HR: 81 (25 Jul 2022 05:48) (81 - 94)  BP: 129/66 (25 Jul 2022 05:48) (126/64 - 137/63)  BP(mean): 90 (25 Jul 2022 05:48) (89 - 90)  RR: 18 (25 Jul 2022 05:48) (18 - 18)  SpO2: --    General:[  x ] NAD, Resting Comfortable,   [   ] other: pleasant                               HEENT: [   ] NC/AT, EOMI, PERRL , Normal Conjunctivae,   [x   ] other:  left forehead with large hematoma and echymosis,  laceration above left eye covered by steristrip. b/l raccoon eyes.   Cardio: [x   ] RRR, no murmur,   [   ] other:                              Pulm: [ x  ] No Respiratory Distress,  Lungs CTAB,   [   ] other:                       Abdomen: [ x  ]ND/NT, Soft,   [   ] other:    : [  x ] NO DYKES CATHETER, [   ] DYKES CATHETER- no meatal tear, no discharge, [   ] other:                                            MSK: [ x  ] No joint swelling, Full ROM,   [  x ] other:  ulnar deviation of fingers b/l                                       Ext: [   x]No C/C/E, No calf tenderness,   [   ]other:    Skin: [  x ]intact,   [ x  ] other:  1/2 inch diameter mole on right hand                                                                   Neurological Examination:  Cognitive: [ x   ] AAO x 3,   [    ]  other:                                                                      Attention:  [ x   ] intact,   [    ]  other: 3/3 word recall                             Memory: [  x  ] intact,    [    ]  other:     Mood/Affect: [  x  ] wnl,    [    ]  other:                                                                             Communication: [  x  ]Fluent, no dysarthria, following commands:  [    ] other:   CN II - XII:  [   x ] intact,  [    ] other:                                                                                        Motor:   RIGHT UE: [   ] WNL,  [   ] other: 4/5   LEFT    UE: [   ] WNL,  [   ] other: 4/5  RIGHT LE: [   ] WNL,  [   ] other: 4/5  LEFT    LE: [   ] WNL,  [   ] other: 4/5  2+/5 b/l shoulders   Tone: [   x ] wnl,   [    ]  other:  DTRs: [x   ]symmetric, [   ] other:  Coordination:   [ x   ] intact,   [    ] other:                                                               Sensory: [ x   ] Intact to light touch,   [    ] other:      MEDICATIONS  (STANDING):  heparin   Injectable 5000 Unit(s) SubCutaneous every 8 hours  losartan 50 milliGRAM(s) Oral daily    MEDICATIONS  (PRN):  acetaminophen     Tablet .. 650 milliGRAM(s) Oral every 6 hours PRN Severe Pain (7 - 10)  melatonin 3 milliGRAM(s) Oral at bedtime PRN Insomnia  senna 2 Tablet(s) Oral at bedtime PRN Constipation        RECENT LABS/IMAGING                        13.7   12.39 )-----------( 293      ( 25 Jul 2022 08:15 )             42.5     07-25    142  |  102  |  25<H>  ----------------------------<  123<H>  4.2   |  27  |  0.5<L>    Ca    8.8      25 Jul 2022 08:15  Mg     2.1     07-25    TPro  6.2  /  Alb  3.6  /  TBili  0.5  /  DBili  x   /  AST  14  /  ALT  11  /  AlkPhos  79  07-25

## 2022-07-26 RX ORDER — ACETAMINOPHEN 500 MG
650 TABLET ORAL EVERY 12 HOURS
Refills: 0 | Status: DISCONTINUED | OUTPATIENT
Start: 2022-07-26 | End: 2022-08-02

## 2022-07-26 RX ADMIN — Medication 650 MILLIGRAM(S): at 11:00

## 2022-07-26 RX ADMIN — Medication 650 MILLIGRAM(S): at 17:59

## 2022-07-26 RX ADMIN — HEPARIN SODIUM 5000 UNIT(S): 5000 INJECTION INTRAVENOUS; SUBCUTANEOUS at 13:37

## 2022-07-26 RX ADMIN — HEPARIN SODIUM 5000 UNIT(S): 5000 INJECTION INTRAVENOUS; SUBCUTANEOUS at 06:23

## 2022-07-26 RX ADMIN — HEPARIN SODIUM 5000 UNIT(S): 5000 INJECTION INTRAVENOUS; SUBCUTANEOUS at 21:44

## 2022-07-26 RX ADMIN — LOSARTAN POTASSIUM 50 MILLIGRAM(S): 100 TABLET, FILM COATED ORAL at 06:23

## 2022-07-26 RX ADMIN — Medication 650 MILLIGRAM(S): at 11:50

## 2022-07-26 RX ADMIN — Medication 650 MILLIGRAM(S): at 17:26

## 2022-07-26 NOTE — PROGRESS NOTE ADULT - ASSESSMENT
ASSESSMENT/PLAN    Rehab of multitrauma after fall with resulting displaced C1 vertebrae, c2 odontoid fracture, and scalp hematoma/ laceration with significant decline in function    #C1/ C2 vertebra fracture, type II dens fracture   - Pain: Tylenol PRN  - [place] J collar in place  - Neurosurgery consult: no neurosurgical intervention, f/u w/ Dr. Pulido in 2 weeks outpatient  - CTA: The vertebral arteries are patent, without evidence of   vascular occlusion, extravasation, or dissection. mild compression upon the right vertebral artery by the inferior corner of the lateral mass of C1, which is mildly displaced laterally  - continue to monitor neuro status closely   - c/w pt and ot; participating well     #Head Trauma-  - forhead hematoma/ laceration/ echymosis  - no intracranial trauma, LOC or evidence of concussion syndrome  - monitor    #Hypertension   - c/w home losartan 50mg daily   - inpt bp goal <140/90    #Osteoporosis  - on home aldredonate   - not available in hospital    #Leukocytosis   - likely reactive 2/2 to trauma  - downtrending; c/w weekly cbc      -Pain control: tylenol PRN     -Skin: monitor laceration/ contusion    -FEN: labs stable.     - Diet: DASH   ASSESSMENT/PLAN    Rehab of multitrauma after fall with resulting displaced C1 vertebrae, c2 odontoid fracture, and scalp hematoma/ laceration with significant decline in function    #C1/ C2 vertebra fracture, type II dens fracture   - Pain: Tylenol PRN  - [place] J collar in place  - Neurosurgery consult: no neurosurgical intervention, f/u w/ Dr. Pulido in 2 weeks outpatient  - CTA: The vertebral arteries are patent, without evidence of   vascular occlusion, extravasation, or dissection. mild compression upon the right vertebral artery by the inferior corner of the lateral mass of C1, which is mildly displaced laterally  - continue to monitor neuro status closely   - c/w pt and ot; participating well     #Osteoarthritis of knees  - likely cause of fall  - ther-ex, prn Tylenol    #Head Trauma-  - forhead hematoma/ laceration/ echymosis  - no intracranial trauma, LOC or evidence of concussion syndrome  - monitor    #Hypertension   - c/w home losartan 50mg daily   - inpt bp goal <140/90    #Osteoporosis  - on home aldredonate   - not available in hospital    #Leukocytosis   - likely reactive 2/2 to trauma  - downtrending; c/w weekly cbc      -Pain control: tylenol PRN     -Skin: monitor laceration/ contusion    -FEN: labs stable.     - Diet: DASH

## 2022-07-26 NOTE — PROGRESS NOTE ADULT - SUBJECTIVE AND OBJECTIVE BOX
Patient is a 89y old  Female who presents with a chief complaint of rehabilitation of multitrauma after fall with resulting displaced C1 vertebrae, c2 odontoid fracture, and scalp hematoma with significant decline in function (24 Jul 2022 11:32)  HPI:  This is a 89yo PMH of HTN, aortic aneurysm who presented as a trauma after a fall. Patient endorsed she was taking her blood pressure medication and then fell forward and hit her head on the counter. No LOC, and no AC. She did not fall to the ground, and was able to ambulate. She had associated head and neck pain that was non-radiating. Patient sought medical attention at [facility] for a forehead laceration that was actively bleeding which she sustained after the trauma. Imaging was done revealed multiple traumas including multiple acute displaced fractures of C1 vertebrae with mild compression of right vertebral artery, and C2 type II odontoid fracture with angulation of superior fracture fragment, supraorbital scalp hematoma. Patient was transferred to [facility] and admitted to SICU.  Neurosurgery was consulted, and no surgical intervention was recommended; just to wear cervical MIAMI J collar at all times. Of note also patient had a fall previous to this in Feb 2022, after which went for rehab and then required a cane and walker for ambulation but still was independent with ADLs; prior to that fall she was independent. Patient was evaluated by physical therapy and occupational and was found to be CG RW 30',  transfer min assist, bed mobility mod assist, bathing max assist, upper body and lower body dressing mod assist, toilet hygiene mod assist, and grooming min assist. Patient was evaluated by physiatry and was found to be a good candidate for acute rehab given her multitrauma with significant injuries can benefit from a physiatrists supervision, and with significant decline in function from baseline can benefit from multidisciplinary therapy 3hours per day for a minimum of 5 days of week.  Patient denied chest pain, shortness of breath, dizziness, paresthesias, new weakness, memory changes, and changes in urinary and bowel habits.      TODAY'S SUBJECTIVE & REVIEW OF SYMPTOMS:  Patient seen and examined at bedside this AM. Patient is pleasant and endorses she is doing well in therapy Patient did endorse she would like some pain medications only when asked - added standing tylenol order. Bruises are healing on her face. HD stable. No acute overnight events.        Constitutional        [x ] WNL           [   ] poor appetite   [   ] insomnia   [   ] tired   Cardio:                [ x  ] WNL           [   ] CP   [   ] HINDS   [   ] palpitations               Resp:                   [ x  ] WNL           [   ] SOB   [   ] cough   [   ] wheezing   GI:                        [ x  ] WNL           [   ] constipation   [   ] diarrhea   [   ] abdominal pain   [   ] nausea   [   ] emesis                                :                      [ x  ] WNL           [   ] DYKES  [   ] dysuria   [   ] difficulty voiding             Endo:                   [ x  ] WNL          [   ] polyuria   [   ] temperature intolerance                 Skin:                     [   ] WNL          [   ] pain   [ x  ] wound   [   ] rash   MSK:                    [x   ] WNL          [   ] muscle pain   [   ] joint pain/ stiffness   [   ] muscle tenderness   [   ] swelling   Neuro:                 [ x  ] WNL          [   ] HA   [   ] change in vision   [   ] tremor   [   ] weakness   [   ]dysphagia              Cognitive:           [   x] WNL           [   ]confusion      Psych:                  [ x  ] WNL           [   ] hallucinations   [   ]agitation   [   ] delusion   [   ]depression      PHYSICAL EXAM  Vital Signs Last 24 Hrs  T(C): 35.9 (26 Jul 2022 06:10), Max: 37 (25 Jul 2022 21:07)  T(F): 96.6 (26 Jul 2022 06:10), Max: 98.6 (25 Jul 2022 21:07)  HR: 60 (26 Jul 2022 06:10) (60 - 97)  BP: 136/64 (26 Jul 2022 06:10) (106/56 - 141/77)  BP(mean): 78 (25 Jul 2022 21:07) (78 - 78)  RR: 18 (26 Jul 2022 06:10) (18 - 18)  SpO2: --      General:[  x ] NAD, Resting Comfortable,   [   ] other: pleasant                               HEENT: [   ] NC/AT, EOMI, PERRL , Normal Conjunctivae,   [x   ] other:  left forehead with large hematoma and ecchymosis - healing; laceration above left eye covered by steristrip. b/l raccoon eyes.   Cardio: [x   ] RRR, no murmur,   [   ] other:                              Pulm: [ x  ] No Respiratory Distress,  Lungs CTAB,   [   ] other:                       Abdomen: [ x ] ND/NT, Soft,   [   ] other:    : [  x ] NO DYKES CATHETER, [   ] DYKES CATHETER- no meatal tear, no discharge, [   ] other:                                            MSK: [ x  ] No joint swelling, Full ROM,   [  x ] other:  ulnar deviation of fingers b/l                                       Ext: [   x]No C/C/E, No calf tenderness,   [   ]other:    Skin: [  x ]intact,   [ x  ] other:  1/2 inch diameter mole on right hand                                                                   Neurological Examination:  Cognitive: [ x   ] AAO x 3,   [    ]  other:                                                                      Attention:  [ x  ] intact,   [    ]  other: 3/3 word recall                             Memory: [  x  ] intact,    [    ]  other:     Mood/Affect: [  x  ] wnl,    [    ]  other:                                                                             Communication: [  x  ]Fluent, no dysarthria, following commands:  [    ] other:   CN II - XII:  [   x ] intact,  [    ] other:                                                                                        Motor:   RIGHT UE: [   ] WNL,  [   ] other: 4/5   LEFT    UE: [   ] WNL,  [   ] other: 4/5  RIGHT LE: [   ] WNL,  [   ] other: 4/5  LEFT    LE: [   ] WNL,  [   ] other: 4/5  2+/5 b/l shoulders   Tone: [   x ] wnl,   [    ]  other:  DTRs: [x   ]symmetric, [   ] other:  Coordination:   [ x   ] intact,   [    ] other:                                                               Sensory: [ x   ] Intact to light touch,   [    ] other:    MEDICATIONS  (STANDING):  acetaminophen     Tablet .. 650 milliGRAM(s) Oral every 12 hours  heparin   Injectable 5000 Unit(s) SubCutaneous every 8 hours  losartan 50 milliGRAM(s) Oral daily    MEDICATIONS  (PRN):  acetaminophen     Tablet .. 650 milliGRAM(s) Oral every 6 hours PRN Severe Pain (7 - 10)  melatonin 3 milliGRAM(s) Oral at bedtime PRN Insomnia  senna 2 Tablet(s) Oral at bedtime PRN Constipation      RECENT LABS/IMAGING                        13.7   12.39 )-----------( 293      ( 25 Jul 2022 08:15 )             42.5     07-25    142  |  102  |  25<H>  ----------------------------<  123<H>  4.2   |  27  |  0.5<L>    Ca    8.8      25 Jul 2022 08:15  Mg     2.1     07-25    TPro  6.2  /  Alb  3.6  /  TBili  0.5  /  DBili  x   /  AST  14  /  ALT  11  /  AlkPhos  79  07-25     Patient is a 89y old  Female who presents with a chief complaint of rehabilitation of multitrauma after fall with resulting displaced C1 vertebrae, c2 odontoid fracture, and scalp hematoma with significant decline in function (24 Jul 2022 11:32)  HPI:  This is a 89yo PMH of HTN, aortic aneurysm who presented as a trauma after a fall. Patient endorsed she was taking her blood pressure medication and then fell forward and hit her head on the counter. No LOC, and no AC. She did not fall to the ground, and was able to ambulate. She had associated head and neck pain that was non-radiating. Patient sought medical attention at [facility] for a forehead laceration that was actively bleeding which she sustained after the trauma. Imaging was done revealed multiple traumas including multiple acute displaced fractures of C1 vertebrae with mild compression of right vertebral artery, and C2 type II odontoid fracture with angulation of superior fracture fragment, supraorbital scalp hematoma. Patient was transferred to [facility] and admitted to SICU.  Neurosurgery was consulted, and no surgical intervention was recommended; just to wear cervical MIAMI J collar at all times. Of note also patient had a fall previous to this in Feb 2022, after which went for rehab and then required a cane and walker for ambulation but still was independent with ADLs; prior to that fall she was independent. Patient was evaluated by physical therapy and occupational and was found to be CG RW 30',  transfer min assist, bed mobility mod assist, bathing max assist, upper body and lower body dressing mod assist, toilet hygiene mod assist, and grooming min assist. Patient was evaluated by physiatry and was found to be a good candidate for acute rehab given her multitrauma with significant injuries can benefit from a physiatrists supervision, and with significant decline in function from baseline can benefit from multidisciplinary therapy 3hours per day for a minimum of 5 days of week.  Patient denied chest pain, shortness of breath, dizziness, paresthesias, new weakness, memory changes, and changes in urinary and bowel habits.      TODAY'S SUBJECTIVE & REVIEW OF SYMPTOMS:  Patient seen and examined at bedside this AM. Patient is pleasant and endorses she is doing well in therapy Patient did endorse she would like some pain medications only when asked - added standing tylenol order. Bruises are healing on her face. HD stable. No acute overnight events.        Constitutional        [x ] WNL           [   ] poor appetite   [   ] insomnia   [   ] tired   Cardio:                [ x  ] WNL           [   ] CP   [   ] HINDS   [   ] palpitations               Resp:                   [ x  ] WNL           [   ] SOB   [   ] cough   [   ] wheezing   GI:                        [ x  ] WNL           [   ] constipation   [   ] diarrhea   [   ] abdominal pain   [   ] nausea   [   ] emesis                                :                      [ x  ] WNL           [   ] DYKES  [   ] dysuria   [   ] difficulty voiding             Endo:                   [ x  ] WNL          [   ] polyuria   [   ] temperature intolerance                 Skin:                     [   ] WNL          [   ] pain   [ x  ] wound - forehead hematoma  [   ] rash   MSK:                    [x   ] WNL          [   ] muscle pain   [   ] joint pain/ stiffness   [   ] muscle tenderness   [   ] swelling   Neuro:                 [ x  ] WNL          [   ] HA   [   ] change in vision   [   ] tremor   [   ] weakness   [   ]dysphagia              Cognitive:           [   x] WNL           [   ]confusion      Psych:                  [ x  ] WNL           [   ] hallucinations   [   ]agitation   [   ] delusion   [   ]depression      PHYSICAL EXAM  Vital Signs Last 24 Hrs  T(C): 35.9 (26 Jul 2022 06:10), Max: 37 (25 Jul 2022 21:07)  T(F): 96.6 (26 Jul 2022 06:10), Max: 98.6 (25 Jul 2022 21:07)  HR: 60 (26 Jul 2022 06:10) (60 - 97)  BP: 136/64 (26 Jul 2022 06:10) (106/56 - 141/77)  BP(mean): 78 (25 Jul 2022 21:07) (78 - 78)  RR: 18 (26 Jul 2022 06:10) (18 - 18)  SpO2: --      General:[  x ] NAD, Resting Comfortable,   [   ] other: pleasant                               HEENT: [   ] NC/AT, EOMI, PERRL , Normal Conjunctivae,   [x   ] other:  left forehead with large hematoma and ecchymosis - healing; laceration above left eye covered by steristrip. b/l raccoon eyes.   Cardio: [x   ] RRR, no murmur,   [   ] other:                              Pulm: [ x  ] No Respiratory Distress,  Lungs CTAB,   [   ] other:                       Abdomen: [ x ] ND/NT, Soft,   [   ] other:    : [  x ] NO DYKES CATHETER, [   ] DYKES CATHETER- no meatal tear, no discharge, [   ] other:                                            MSK: [ x  ] No joint swelling, Full ROM,   [  x ] other:  ulnar deviation of fingers b/l                                       Ext: [   x]No C/C/E, No calf tenderness,   [   ]other:    Skin: [  x ]intact,   [ x  ] other:  1/2 inch diameter mole on right hand                                                                   Neurological Examination:  Cognitive: [ x   ] AAO x 3,   [    ]  other:                                                                      Attention:  [ x  ] intact,   [    ]  other: 3/3 word recall                             Memory: [  x  ] intact,    [    ]  other:     Mood/Affect: [  x  ] wnl,    [    ]  other:                                                                             Communication: [  x  ]Fluent, no dysarthria, following commands:  [    ] other:   CN II - XII:  [   x ] intact,  [    ] other:                                                                                        Motor:   RIGHT UE: [   ] WNL,  [   ] other: 4/5   LEFT    UE: [   ] WNL,  [   ] other: 4/5  RIGHT LE: [   ] WNL,  [   ] other: 4/5  LEFT    LE: [   ] WNL,  [   ] other: 4/5  2+/5 b/l shoulders   Tone: [   x ] wnl,   [    ]  other:  DTRs: [x   ]symmetric, [   ] other:  Coordination:   [ x   ] intact,   [    ] other:                                                               Sensory: [ x   ] Intact to light touch,   [    ] other:    MEDICATIONS  (STANDING):  acetaminophen     Tablet .. 650 milliGRAM(s) Oral every 12 hours  heparin   Injectable 5000 Unit(s) SubCutaneous every 8 hours  losartan 50 milliGRAM(s) Oral daily    MEDICATIONS  (PRN):  acetaminophen     Tablet .. 650 milliGRAM(s) Oral every 6 hours PRN Severe Pain (7 - 10)  melatonin 3 milliGRAM(s) Oral at bedtime PRN Insomnia  senna 2 Tablet(s) Oral at bedtime PRN Constipation      RECENT LABS/IMAGING                        13.7   12.39 )-----------( 293      ( 25 Jul 2022 08:15 )             42.5     07-25    142  |  102  |  25<H>  ----------------------------<  123<H>  4.2   |  27  |  0.5<L>    Ca    8.8      25 Jul 2022 08:15  Mg     2.1     07-25    TPro  6.2  /  Alb  3.6  /  TBili  0.5  /  DBili  x   /  AST  14  /  ALT  11  /  AlkPhos  79  07-25

## 2022-07-27 RX ADMIN — Medication 650 MILLIGRAM(S): at 18:44

## 2022-07-27 RX ADMIN — HEPARIN SODIUM 5000 UNIT(S): 5000 INJECTION INTRAVENOUS; SUBCUTANEOUS at 21:30

## 2022-07-27 RX ADMIN — HEPARIN SODIUM 5000 UNIT(S): 5000 INJECTION INTRAVENOUS; SUBCUTANEOUS at 13:15

## 2022-07-27 RX ADMIN — Medication 650 MILLIGRAM(S): at 06:27

## 2022-07-27 RX ADMIN — Medication 650 MILLIGRAM(S): at 17:58

## 2022-07-27 RX ADMIN — LOSARTAN POTASSIUM 50 MILLIGRAM(S): 100 TABLET, FILM COATED ORAL at 05:41

## 2022-07-27 RX ADMIN — HEPARIN SODIUM 5000 UNIT(S): 5000 INJECTION INTRAVENOUS; SUBCUTANEOUS at 05:41

## 2022-07-27 RX ADMIN — Medication 650 MILLIGRAM(S): at 05:41

## 2022-07-27 NOTE — PROGRESS NOTE ADULT - SUBJECTIVE AND OBJECTIVE BOX
Patient is a 89y old  Female who presents with a chief complaint of rehabilitation of multitrauma after fall with resulting displaced C1 vertebrae, c2 odontoid fracture, and scalp hematoma with significant decline in function (24 Jul 2022 11:32)  HPI:  This is a 89yo PMH of HTN, aortic aneurysm who presented as a trauma after a fall. Patient endorsed she was taking her blood pressure medication and then fell forward and hit her head on the counter. No LOC, and no AC. She did not fall to the ground, and was able to ambulate. She had associated head and neck pain that was non-radiating. Patient sought medical attention at [facility] for a forehead laceration that was actively bleeding which she sustained after the trauma. Imaging was done revealed multiple traumas including multiple acute displaced fractures of C1 vertebrae with mild compression of right vertebral artery, and C2 type II odontoid fracture with angulation of superior fracture fragment, supraorbital scalp hematoma. Patient was transferred to [facility] and admitted to SICU.  Neurosurgery was consulted, and no surgical intervention was recommended; just to wear cervical MIAMI J collar at all times. Of note also patient had a fall previous to this in Feb 2022, after which went for rehab and then required a cane and walker for ambulation but still was independent with ADLs; prior to that fall she was independent. Patient was evaluated by physical therapy and occupational and was found to be CG RW 30',  transfer min assist, bed mobility mod assist, bathing max assist, upper body and lower body dressing mod assist, toilet hygiene mod assist, and grooming min assist. Patient was evaluated by physiatry and was found to be a good candidate for acute rehab given her multitrauma with significant injuries can benefit from a physiatrists supervision, and with significant decline in function from baseline can benefit from multidisciplinary therapy 3hours per day for a minimum of 5 days of week.  Patient denied chest pain, shortness of breath, dizziness, paresthesias, new weakness, memory changes, and changes in urinary and bowel habits.      TODAY'S SUBJECTIVE & REVIEW OF SYMPTOMS:  Patient seen and examined at bedside this AM. Patient is pleasant and is participating well in therapy. VS reviewed, hemodyn stable. No acute overnight events.        Constitutional        [x ] WNL           [   ] poor appetite   [   ] insomnia   [   ] tired   Cardio:                [ x  ] WNL           [   ] CP   [   ] HINDS   [   ] palpitations               Resp:                   [ x  ] WNL           [   ] SOB   [   ] cough   [   ] wheezing   GI:                        [ x  ] WNL           [   ] constipation   [   ] diarrhea   [   ] abdominal pain   [   ] nausea   [   ] emesis                                :                      [ x  ] WNL           [   ] DYKES  [   ] dysuria   [   ] difficulty voiding             Endo:                   [ x  ] WNL          [   ] polyuria   [   ] temperature intolerance                 Skin:                     [   ] WNL          [   ] pain   [ x  ] wound - forehead hematoma  [   ] rash   MSK:                    [x   ] WNL          [   ] muscle pain   [   ] joint pain/ stiffness   [   ] muscle tenderness   [   ] swelling   Neuro:                 [ x  ] WNL          [   ] HA   [   ] change in vision   [   ] tremor   [   ] weakness   [   ]dysphagia              Cognitive:           [   x] WNL           [   ]confusion      Psych:                  [ x  ] WNL           [   ] hallucinations   [   ]agitation   [   ] delusion   [   ]depression      PHYSICAL EXAM  Vital Signs Last 24 Hrs  T(C): 36.9 (27 Jul 2022 05:20), Max: 36.9 (27 Jul 2022 05:20)  T(F): 98.4 (27 Jul 2022 05:20), Max: 98.4 (27 Jul 2022 05:20)  HR: 81 (27 Jul 2022 05:20) (81 - 82)  BP: 137/64 (27 Jul 2022 05:20) (119/63 - 137/64)  BP(mean): --  RR: 18 (27 Jul 2022 05:20) (18 - 18)  SpO2: --    Parameters below as of 27 Jul 2022 05:20  Patient On (Oxygen Delivery Method): room air      General:[  x ] NAD, Resting Comfortable,   [   ] other: pleasant                               HEENT: [   ] NC/AT, EOMI, PERRL , Normal Conjunctivae,   [x   ] other:  left forehead with large hematoma and ecchymosis - healing; laceration above left eye covered by steristrip. b/l raccoon eyes.   Cardio: [x   ] RRR, no murmur,   [   ] other:                              Pulm: [ x  ] No Respiratory Distress,  Lungs CTAB,   [   ] other:                       Abdomen: [ x ] ND/NT, Soft,   [   ] other:    : [  x ] NO DYKES CATHETER, [   ] DYKES CATHETER- no meatal tear, no discharge, [   ] other:                                            MSK: [ x  ] No joint swelling, Full ROM,   [  x ] other:  ulnar deviation of fingers b/l                                       Ext: [   x]No C/C/E, No calf tenderness,   [   ]other:    Skin: [  x ]intact,   [ x  ] other:  1/2 inch diameter mole on right hand                                                                   Neurological Examination:  Cognitive: [ x   ] AAO x 3,   [    ]  other:                                                                      Attention:  [ x  ] intact,   [    ]  other: 3/3 word recall                             Memory: [  x  ] intact,    [    ]  other:     Mood/Affect: [  x  ] wnl,    [    ]  other:                                                                             Communication: [  x  ]Fluent, no dysarthria, following commands:  [    ] other:   CN II - XII:  [   x ] intact,  [    ] other:                                                                                        Motor:   RIGHT UE: [   ] WNL,  [   ] other: 4/5   LEFT    UE: [   ] WNL,  [   ] other: 4/5  RIGHT LE: [   ] WNL,  [   ] other: 4/5  LEFT    LE: [   ] WNL,  [   ] other: 4/5  2+/5 b/l shoulders   Tone: [   x ] wnl,   [    ]  other:  DTRs: [x   ]symmetric, [   ] other:  Coordination:   [ x   ] intact,   [    ] other:                                                               Sensory: [ x   ] Intact to light touch,   [    ] other:    MEDICATIONS  (STANDING):  acetaminophen     Tablet .. 650 milliGRAM(s) Oral every 12 hours  heparin   Injectable 5000 Unit(s) SubCutaneous every 8 hours  losartan 50 milliGRAM(s) Oral daily    MEDICATIONS  (PRN):  acetaminophen     Tablet .. 650 milliGRAM(s) Oral every 6 hours PRN Severe Pain (7 - 10)  melatonin 3 milliGRAM(s) Oral at bedtime PRN Insomnia  senna 2 Tablet(s) Oral at bedtime PRN Constipation        RECENT LABS/IMAGING                        13.7   12.39 )-----------( 293      ( 25 Jul 2022 08:15 )             42.5     07-25    142  |  102  |  25<H>  ----------------------------<  123<H>  4.2   |  27  |  0.5<L>    Ca    8.8      25 Jul 2022 08:15  Mg     2.1     07-25    TPro  6.2  /  Alb  3.6  /  TBili  0.5  /  DBili  x   /  AST  14  /  ALT  11  /  AlkPhos  79  07-25

## 2022-07-27 NOTE — PROGRESS NOTE ADULT - ASSESSMENT
ASSESSMENT/PLAN    Rehab of multitrauma after fall with resulting displaced C1 vertebrae, c2 odontoid fracture, and scalp hematoma/ laceration with significant decline in function    #C1/ C2 vertebra fracture, type II dens fracture   - Pain: Tylenol PRN  - [place] J collar in place  - Neurosurgery consult: no neurosurgical intervention, f/u w/ Dr. Pulido in 2 weeks outpatient  - CTA: The vertebral arteries are patent, without evidence of   vascular occlusion, extravasation, or dissection. mild compression upon the right vertebral artery by the inferior corner of the lateral mass of C1, which is mildly displaced laterally  - continue to monitor neuro status closely   - c/w pt and ot; participating well     #Osteoarthritis of knees  - likely cause of fall  - ther-ex, prn Tylenol    #Head Trauma  - forhead hematoma/ laceration/ echymosis  - no intracranial trauma, LOC or evidence of concussion syndrome  - monitor    #Hypertension   - c/w home losartan 50mg daily   - inpt bp goal <140/90    #Osteoporosis  - on home aldredonate   - not available in hospital    #Leukocytosis   - likely reactive 2/2 to trauma  - downtrending; c/w weekly cbc      -Pain control: tylenol PRN     -Skin: monitor laceration/ contusion    -FEN: labs stable.     - Diet: DASH   ASSESSMENT/PLAN    Rehab of multitrauma after fall with resulting displaced C1 vertebrae, c2 odontoid fracture, and scalp hematoma/ laceration with significant decline in function    #C1/ C2 vertebra fracture, type II dens fracture   - Pain: Tylenol PRN  - [place] J collar in place all times  - Neurosurgery consult: no neurosurgical intervention, f/u w/ Dr. Pulido in 2 weeks outpatient  - CTA: The vertebral arteries are patent, without evidence of   vascular occlusion, extravasation, or dissection. mild compression upon the right vertebral artery by the inferior corner of the lateral mass of C1, which is mildly displaced laterally  - continue to monitor neuro status closely   - c/w pt and ot; participating well     #Osteoarthritis of knees  - likely cause of fall  - ther-ex, prn Tylenol    #Head Trauma  - forehead hematoma/ laceration/ ecchymosis  - no intracranial trauma, LOC or evidence of concussion syndrome  - monitor    #Hypertension   - c/w home losartan 50mg daily   - inpt bp goal <140/90    #Osteoporosis  - on home aldredonate   - not available in hospital    #Leukocytosis   - likely reactive 2/2 to trauma  - downtrending; c/w weekly cbc      -Pain control: tylenol PRN     -Skin: monitor laceration/ contusion    -FEN: labs stable.     - Diet: DASH

## 2022-07-28 RX ADMIN — HEPARIN SODIUM 5000 UNIT(S): 5000 INJECTION INTRAVENOUS; SUBCUTANEOUS at 21:07

## 2022-07-28 RX ADMIN — Medication 650 MILLIGRAM(S): at 18:53

## 2022-07-28 RX ADMIN — LOSARTAN POTASSIUM 50 MILLIGRAM(S): 100 TABLET, FILM COATED ORAL at 05:03

## 2022-07-28 RX ADMIN — HEPARIN SODIUM 5000 UNIT(S): 5000 INJECTION INTRAVENOUS; SUBCUTANEOUS at 18:53

## 2022-07-28 RX ADMIN — Medication 650 MILLIGRAM(S): at 06:25

## 2022-07-28 RX ADMIN — Medication 650 MILLIGRAM(S): at 05:04

## 2022-07-28 RX ADMIN — HEPARIN SODIUM 5000 UNIT(S): 5000 INJECTION INTRAVENOUS; SUBCUTANEOUS at 05:04

## 2022-07-28 NOTE — PROGRESS NOTE ADULT - ASSESSMENT
ASSESSMENT/PLAN    Rehab of multitrauma after fall with resulting displaced C1 vertebrae, c2 odontoid fracture, and scalp hematoma/ laceration with significant decline in function    #C1/ C2 vertebra fracture, type II dens fracture   - Pain: Tylenol PRN  - [place] J collar in place all times  - Neurosurgery consult: no neurosurgical intervention, f/u w/ Dr. Pulido in 2 weeks outpatient  - CTA: The vertebral arteries are patent, without evidence of vascular occlusion, extravasation, or dissection. mild compression upon the right vertebral artery by the inferior corner of the lateral mass of C1, which is mildly displaced laterally  - continue to monitor neuro status closely   - c/w pt and ot; participating well     #Osteoarthritis of knees  - likely cause of fall  - ther-ex, prn Tylenol    #Head Trauma  - forehead hematoma/ laceration/ ecchymosis  - no intracranial trauma, LOC or evidence of concussion syndrome  - monitor    #Hypertension   - c/w home losartan 50mg daily   - inpt bp goal <140/90    #Osteoporosis  - on home aldredonate   - not available in hospital    #Leukocytosis   - likely reactive 2/2 to trauma  - downtrending; c/w weekly cbc      -Pain control: tylenol PRN     -Skin: monitor laceration/ contusion    -FEN: labs stable.     - Diet: DASH

## 2022-07-28 NOTE — PROGRESS NOTE ADULT - SUBJECTIVE AND OBJECTIVE BOX
Patient is a 89y old  Female who presents with a chief complaint of rehabilitation of multitrauma after fall with resulting displaced C1 vertebrae, c2 odontoid fracture, and scalp hematoma with significant decline in function (24 Jul 2022 11:32)  HPI:  This is a 89yo PMH of HTN, aortic aneurysm who presented as a trauma after a fall. Patient endorsed she was taking her blood pressure medication and then fell forward and hit her head on the counter. No LOC, and no AC. She did not fall to the ground, and was able to ambulate. She had associated head and neck pain that was non-radiating. Patient sought medical attention at [facility] for a forehead laceration that was actively bleeding which she sustained after the trauma. Imaging was done revealed multiple traumas including multiple acute displaced fractures of C1 vertebrae with mild compression of right vertebral artery, and C2 type II odontoid fracture with angulation of superior fracture fragment, supraorbital scalp hematoma. Patient was transferred to [facility] and admitted to SICU.  Neurosurgery was consulted, and no surgical intervention was recommended; just to wear cervical MIAMI J collar at all times. Of note also patient had a fall previous to this in Feb 2022, after which went for rehab and then required a cane and walker for ambulation but still was independent with ADLs; prior to that fall she was independent. Patient was evaluated by physical therapy and occupational and was found to be CG RW 30',  transfer min assist, bed mobility mod assist, bathing max assist, upper body and lower body dressing mod assist, toilet hygiene mod assist, and grooming min assist. Patient was evaluated by physiatry and was found to be a good candidate for acute rehab given her multitrauma with significant injuries can benefit from a physiatrists supervision, and with significant decline in function from baseline can benefit from multidisciplinary therapy 3hours per day for a minimum of 5 days of week.  Patient denied chest pain, shortness of breath, dizziness, paresthesias, new weakness, memory changes, and changes in urinary and bowel habits.      TODAY'S SUBJECTIVE & REVIEW OF SYMPTOMS:  Patient seen and examined at bedside this AM. Patient is pleasant and is participating well in therapy. VS reviewed, hemodyn stable. No acute overnight events. Son was at bedside - discussed patients progress with him and plan of care.        Constitutional        [x ] WNL           [   ] poor appetite   [   ] insomnia   [   ] tired   Cardio:                [ x  ] WNL           [   ] CP   [   ] HINDS   [   ] palpitations               Resp:                   [ x  ] WNL           [   ] SOB   [   ] cough   [   ] wheezing   GI:                        [ x  ] WNL           [   ] constipation   [   ] diarrhea   [   ] abdominal pain   [   ] nausea   [   ] emesis                                :                      [ x  ] WNL           [   ] DYKES  [   ] dysuria   [   ] difficulty voiding             Endo:                   [ x  ] WNL          [   ] polyuria   [   ] temperature intolerance                 Skin:                     [   ] WNL          [   ] pain   [ x  ] wound - forehead hematoma  [   ] rash   MSK:                    [x   ] WNL          [   ] muscle pain   [   ] joint pain/ stiffness   [   ] muscle tenderness   [   ] swelling   Neuro:                 [ x  ] WNL          [   ] HA   [   ] change in vision   [   ] tremor   [   ] weakness   [   ]dysphagia              Cognitive:             [ x] WNL           [   ]confusion      Psych:                  [ x  ] WNL           [   ] hallucinations   [   ]agitation   [   ] delusion   [   ]depression      PHYSICAL EXAM  Vital Signs Last 24 Hrs  T(C): 36.7 (28 Jul 2022 05:22), Max: 36.7 (28 Jul 2022 05:22)  T(F): 98.1 (28 Jul 2022 05:22), Max: 98.1 (28 Jul 2022 05:22)  HR: 77 (28 Jul 2022 05:22) (77 - 84)  BP: 135/81 (28 Jul 2022 05:22) (134/64 - 144/72)  BP(mean): --  RR: 18 (28 Jul 2022 05:22) (18 - 18)  SpO2: --      General: [ x ] NAD, Resting Comfortable,   [   ] other: pleasant                               HEENT: [   ] NC/AT, EOMI, PERRL , Normal Conjunctivae,   [x ] other:  left forehead with large hematoma and ecchymosis - healing; laceration above left eye covered by steristrip. b/l raccoon eyes.   Cardio: [x  ] RRR, no murmur,   [   ] other:                              Pulm: [ x  ] No Respiratory Distress,  Lungs CTAB,   [   ] other:                       Abdomen: [ x ] ND/NT, Soft,   [   ] other:    : [  x ] NO DYKES CATHETER, [   ] DYKES CATHETER- no meatal tear, no discharge, [   ] other:                                            MSK: [ x  ] No joint swelling, Full ROM,   [  x ] other:  ulnar deviation of fingers b/l                                       Ext: [   x]No C/C/E, No calf tenderness,   [   ]other:    Skin: [  x ]intact,   [ x  ] other:  1/2 inch diameter mole on right hand                                                                   Neurological Examination:  Cognitive: [ x   ] AAO x 3,   [    ]  other:                                                                      Attention:  [ x  ] intact,   [    ]  other: 3/3 word recall                             Memory: [  x  ] intact,    [    ]  other:     Mood/Affect: [  x  ] wnl,    [    ]  other:                                                                             Communication: [  x  ]Fluent, no dysarthria, following commands:  [    ] other:   CN II - XII:  [   x ] intact,  [    ] other:                                                                                        Motor:   RIGHT UE: [   ] WNL,  [   ] other: 4/5   LEFT    UE: [   ] WNL,  [   ] other: 4/5  RIGHT LE: [   ] WNL,  [   ] other: 4/5  LEFT    LE: [   ] WNL,  [   ] other: 4/5  2+/5 b/l shoulders   Tone: [   x ] wnl,   [    ]  other:  DTRs: [x   ]symmetric, [   ] other:  Coordination:   [ x   ] intact,   [    ] other:                                                               Sensory: [ x   ] Intact to light touch,   [    ] other:    MEDICATIONS  (STANDING):  acetaminophen     Tablet .. 650 milliGRAM(s) Oral every 12 hours  heparin   Injectable 5000 Unit(s) SubCutaneous every 8 hours  losartan 50 milliGRAM(s) Oral daily    MEDICATIONS  (PRN):  acetaminophen     Tablet .. 650 milliGRAM(s) Oral every 6 hours PRN Severe Pain (7 - 10)  melatonin 3 milliGRAM(s) Oral at bedtime PRN Insomnia  senna 2 Tablet(s) Oral at bedtime PRN Constipation      RECENT LABS/IMAGING

## 2022-07-29 LAB — SARS-COV-2 RNA SPEC QL NAA+PROBE: SIGNIFICANT CHANGE UP

## 2022-07-29 RX ADMIN — HEPARIN SODIUM 5000 UNIT(S): 5000 INJECTION INTRAVENOUS; SUBCUTANEOUS at 13:00

## 2022-07-29 RX ADMIN — Medication 650 MILLIGRAM(S): at 17:01

## 2022-07-29 RX ADMIN — Medication 650 MILLIGRAM(S): at 18:45

## 2022-07-29 RX ADMIN — Medication 650 MILLIGRAM(S): at 07:27

## 2022-07-29 RX ADMIN — Medication 650 MILLIGRAM(S): at 05:02

## 2022-07-29 RX ADMIN — HEPARIN SODIUM 5000 UNIT(S): 5000 INJECTION INTRAVENOUS; SUBCUTANEOUS at 22:01

## 2022-07-29 RX ADMIN — LOSARTAN POTASSIUM 50 MILLIGRAM(S): 100 TABLET, FILM COATED ORAL at 05:02

## 2022-07-29 RX ADMIN — HEPARIN SODIUM 5000 UNIT(S): 5000 INJECTION INTRAVENOUS; SUBCUTANEOUS at 05:03

## 2022-07-29 NOTE — CHART NOTE - NSCHARTNOTEFT_GEN_A_CORE
Registered Dietitian Follow-Up     Patient Profile Reviewed                           Yes [x]   No []     Nutrition History Previously Obtained        Yes [x]  No []       Pertinent Subjective Information:  Patient reports fair appetite and PO intake ~50% of meals. Patient with no c/o n/v/d/c. RD encouraged patient to consumed Ensure Enlive supplements. Unable to obtain weight as patient OOB.     Pertinent Medical Interventions:  #C1/C2 vertebra fracture, type II dens fracture  #Osteoarthritis of knees  #Head Trauma  #HTN  #Osteoporosis  -on home aldredonate     Diet order:  Diet, DASH/TLC:   Sodium & Cholesterol Restricted  Supplement Feeding Modality:  Oral  Ensure Enlive Cans or Servings Per Day:  1       Frequency:  Two Times a day (07-23-22 @ 15:47) [Active]     Anthropometrics:  Height (cm): 157.5 (07-22-22 @ 22:48)  Weight (kg): 48.3 (07-22-22 @ 22:48)  BMI (kg/m2): 19.5 (07-22-22 @ 22:48)  IBW: 50 kg    MEDICATIONS  (STANDING):  acetaminophen     Tablet .. 650 milliGRAM(s) Oral every 12 hours  heparin   Injectable 5000 Unit(s) SubCutaneous every 8 hours  losartan 50 milliGRAM(s) Oral daily    MEDICATIONS  (PRN):  acetaminophen     Tablet .. 650 milliGRAM(s) Oral every 6 hours PRN Severe Pain (7 - 10)  melatonin 3 milliGRAM(s) Oral at bedtime PRN Insomnia  senna 2 Tablet(s) Oral at bedtime PRN Constipation    Pertinent Labs:   7/25: BUN 25 (H), Cr 0.5 (L), Gluc 123 (H)    Physical Findings:  - Appearance: AAO x 4  - GI function: No GI s/s  - Tubes: n/a  - Oral/Mouth cavity: regular texture/consistency  - Skin: forehead laceration     Nutrition Requirements:  Weight Used: 48.3 kg  - estimated needs with consideration for trauma, fracture     Estimated Energy Needs    Continue [x]  Adjust []  1009 - 1177 kcal/day     Estimated Protein Needs    Continue [x]  Adjust []  58 - 63 gm/day (1.2 - 1.3 gm/kg)     Estimated Fluid Needs        Continue [x]  Adjust []  1449 - 1691 mL/day (30 - 35 mL/kg)     Nutrient Intake:   Patient with ~50% PO intake of meals     [x] Previous Nutrition Diagnosis:  Inadequate Energy Intake            [] Ongoing          [] Resolved     Nutrition Intervention:  medical food supplements     Goal/Expected Outcome:   Patient to have >50% PO intake within 7-10 days     Indicator/Monitoring:   RD to monitor energy intake, weight, labs, skin status, NFPF     Recommendation:  1) Continue current diet order  2) Continue Ensure Enlive supplement 2x/day (350 kcal, 20 gm Protein)  3) Obtain new weight    Patient is at low nutrition risk, RD to f/u in 7-10 days or PRN.

## 2022-07-29 NOTE — PROGRESS NOTE ADULT - ASSESSMENT
ASSESSMENT/PLAN    Rehab of multitrauma after fall with resulting displaced C1 vertebrae, c2 odontoid fracture, and scalp hematoma/ laceration with significant decline in function    #C1/ C2 vertebra fracture, type II dens fracture   - Pain: Tylenol PRN  - [place] J collar in place all times  - Neurosurgery consult: no neurosurgical intervention, f/u w/ Dr. Pulido in 2 weeks outpatient  - CTA: The vertebral arteries are patent, without evidence of vascular occlusion, extravasation, or dissection. mild compression upon the right vertebral artery by the inferior corner of the lateral mass of C1, which is mildly displaced laterally  - continue to monitor neuro status closely   - c/w pt and ot; participating well     #Osteoarthritis of knees  - likely cause of fall  - ther-ex, prn Tylenol    #Head Trauma  - forehead hematoma/ laceration/ ecchymosis  - no intracranial trauma, LOC or evidence of concussion syndrome  - monitor    #Hypertension   - c/w home losartan 50mg daily   - inpt bp goal <140/90    #Osteoporosis  - on home aldredonate   - not available in hospital    #Leukocytosis   - likely reactive 2/2 to trauma  - downtrending; c/w weekly cbc      -Pain control: tylenol PRN     -Skin: monitor laceration/ contusion    -FEN: labs stable.     - Diet: DASH

## 2022-07-29 NOTE — PROGRESS NOTE ADULT - SUBJECTIVE AND OBJECTIVE BOX
Patient is a 89y old  Female who presents with a chief complaint of rehabilitation of multitrauma after fall with resulting displaced C1 vertebrae, c2 odontoid fracture, and scalp hematoma with significant decline in function (24 Jul 2022 11:32)  HPI:  This is a 89yo PMH of HTN, aortic aneurysm who presented as a trauma after a fall. Patient endorsed she was taking her blood pressure medication and then fell forward and hit her head on the counter. No LOC, and no AC. She did not fall to the ground, and was able to ambulate. She had associated head and neck pain that was non-radiating. Patient sought medical attention at [facility] for a forehead laceration that was actively bleeding which she sustained after the trauma. Imaging was done revealed multiple traumas including multiple acute displaced fractures of C1 vertebrae with mild compression of right vertebral artery, and C2 type II odontoid fracture with angulation of superior fracture fragment, supraorbital scalp hematoma. Patient was transferred to [facility] and admitted to SICU.  Neurosurgery was consulted, and no surgical intervention was recommended; just to wear cervical MIAMI J collar at all times. Of note also patient had a fall previous to this in Feb 2022, after which went for rehab and then required a cane and walker for ambulation but still was independent with ADLs; prior to that fall she was independent. Patient was evaluated by physical therapy and occupational and was found to be CG RW 30',  transfer min assist, bed mobility mod assist, bathing max assist, upper body and lower body dressing mod assist, toilet hygiene mod assist, and grooming min assist. Patient was evaluated by physiatry and was found to be a good candidate for acute rehab given her multitrauma with significant injuries can benefit from a physiatrists supervision, and with significant decline in function from baseline can benefit from multidisciplinary therapy 3hours per day for a minimum of 5 days of week.  Patient denied chest pain, shortness of breath, dizziness, paresthesias, new weakness, memory changes, and changes in urinary and bowel habits.      TODAY'S SUBJECTIVE & REVIEW OF SYMPTOMS:  Patient seen and examined at bedside this AM. Patient is pleasant and is participating well in therapy. VS reviewed, hemodyn stable. No acute overnight events. No patietn complaints.      Constitutional        [x ] WNL           [   ] poor appetite   [   ] insomnia   [   ] tired   Cardio:                [ x  ] WNL           [   ] CP   [   ] HINDS   [   ] palpitations               Resp:                   [ x  ] WNL           [   ] SOB   [   ] cough   [   ] wheezing   GI:                        [ x  ] WNL           [   ] constipation   [   ] diarrhea   [   ] abdominal pain   [   ] nausea   [   ] emesis                                :                      [ x  ] WNL           [   ] DYKES  [   ] dysuria   [   ] difficulty voiding             Endo:                   [ x  ] WNL          [   ] polyuria   [   ] temperature intolerance                 Skin:                     [   ] WNL          [   ] pain   [ x  ] wound - forehead hematoma decreasing in size  [   ] rash   MSK:                    [x   ] WNL          [   ] muscle pain   [   ] joint pain/ stiffness   [   ] muscle tenderness   [   ] swelling   Neuro:                 [ x  ] WNL          [   ] HA   [   ] change in vision   [   ] tremor   [   ] weakness   [   ]dysphagia              Cognitive:             [ x] WNL           [   ]confusion      Psych:                  [ x  ] WNL           [   ] hallucinations   [   ]agitation   [   ] delusion   [   ]depression      PHYSICAL EXAM  Vital Signs Last 24 Hrs  T(C): 36.9 (29 Jul 2022 12:26), Max: 36.9 (28 Jul 2022 21:23)  T(F): 98.5 (29 Jul 2022 12:26), Max: 98.5 (28 Jul 2022 21:23)  HR: 81 (29 Jul 2022 12:26) (77 - 86)  BP: 133/60 (29 Jul 2022 12:26) (133/60 - 179/81)  BP(mean): 99 (29 Jul 2022 05:54) (99 - 99)  RR: 18 (29 Jul 2022 12:26) (18 - 18)  SpO2: --      General: [ x ] NAD, Resting Comfortable,   [   ] other: pleasant                               HEENT: [   ] NC/AT, EOMI, PERRL , Normal Conjunctivae,   [x ] other:  left forehead with large hematoma and ecchymosis - healing; laceration above left eye covered by steristrip. b/l raccoon eyes - Improving   Cardio: [x  ] RRR, no murmur,   [   ] other:                              Pulm: [ x  ] No Respiratory Distress,  Lungs CTAB,   [   ] other:                       Abdomen: [ x ] ND/NT, Soft,   [   ] other:    : [  x ] NO DYKES CATHETER, [   ] DYKES CATHETER- no meatal tear, no discharge, [   ] other:                                            MSK: [ x  ] No joint swelling, Full ROM,   [  x ] other:  ulnar deviation of fingers b/l                                       Ext: [   x]No C/C/E, No calf tenderness,   [   ]other:    Skin: [  x ]intact,   [ x  ] other:  1/2 inch diameter mole on right hand                                                                   Neurological Examination:  Cognitive: [ x   ] AAO x 3,   [    ]  other:                                                                      Attention:  [ x  ] intact,   [    ]  other: 3/3 word recall                             Memory: [  x  ] intact,    [    ]  other:     Mood/Affect: [  x  ] wnl,    [    ]  other:                                                                             Communication: [  x  ]Fluent, no dysarthria, following commands:  [    ] other:   CN II - XII:  [   x ] intact,  [    ] other:                                                                                        Motor:   RIGHT UE: [   ] WNL,  [   ] other: 4/5   LEFT    UE: [   ] WNL,  [   ] other: 4/5  RIGHT LE: [   ] WNL,  [   ] other: 4/5  LEFT    LE: [   ] WNL,  [   ] other: 4/5  2+/5 b/l shoulders   Tone: [   x ] wnl,   [    ]  other:  DTRs: [x   ]symmetric, [   ] other:  Coordination:   [ x   ] intact,   [    ] other:                                                               Sensory: [ x   ] Intact to light touch,   [    ] other:    MEDICATIONS  (STANDING):  acetaminophen     Tablet .. 650 milliGRAM(s) Oral every 12 hours  heparin   Injectable 5000 Unit(s) SubCutaneous every 8 hours  losartan 50 milliGRAM(s) Oral daily    MEDICATIONS  (PRN):  acetaminophen     Tablet .. 650 milliGRAM(s) Oral every 6 hours PRN Severe Pain (7 - 10)  melatonin 3 milliGRAM(s) Oral at bedtime PRN Insomnia  senna 2 Tablet(s) Oral at bedtime PRN Constipation      RECENT LABS/IMAGING           NONE

## 2022-07-30 RX ADMIN — Medication 650 MILLIGRAM(S): at 06:41

## 2022-07-30 RX ADMIN — Medication 650 MILLIGRAM(S): at 07:15

## 2022-07-30 RX ADMIN — HEPARIN SODIUM 5000 UNIT(S): 5000 INJECTION INTRAVENOUS; SUBCUTANEOUS at 06:41

## 2022-07-30 RX ADMIN — HEPARIN SODIUM 5000 UNIT(S): 5000 INJECTION INTRAVENOUS; SUBCUTANEOUS at 15:21

## 2022-07-30 RX ADMIN — LOSARTAN POTASSIUM 50 MILLIGRAM(S): 100 TABLET, FILM COATED ORAL at 06:41

## 2022-07-30 RX ADMIN — HEPARIN SODIUM 5000 UNIT(S): 5000 INJECTION INTRAVENOUS; SUBCUTANEOUS at 21:22

## 2022-07-30 NOTE — PROGRESS NOTE ADULT - SUBJECTIVE AND OBJECTIVE BOX
MEDICATIONS  (STANDING):  acetaminophen     Tablet .. 650 milliGRAM(s) Oral every 12 hours  heparin   Injectable 5000 Unit(s) SubCutaneous every 8 hours  losartan 50 milliGRAM(s) Oral daily    MEDICATIONS  (PRN):  acetaminophen     Tablet .. 650 milliGRAM(s) Oral every 6 hours PRN Severe Pain (7 - 10)  melatonin 3 milliGRAM(s) Oral at bedtime PRN Insomnia  senna 2 Tablet(s) Oral at bedtime PRN Constipation      Patient was stable overnight and expresses no new complaints.    T(C): 37.5 (07-30-22 @ 11:59), Max: 37.5 (07-30-22 @ 11:59)  HR: 89 (07-30-22 @ 11:59) (78 - 89)  BP: 148/77 (07-30-22 @ 11:59) (148/77 - 165/72)  RR: 18 (07-30-22 @ 11:59) (18 - 18)  SpO2: --      PE:    Alert   LUNGS- clear  COR- RRR S1S2  ABD- SOFT, NT  EXTR- w/o edema  NEURO- stable                      Rehab for multitrauma after fall with resulting displaced C1 vertebrae, C2 odontoid fracture, and scalp hematoma with significant decline in function    Continue full acute rehab program.

## 2022-07-31 RX ADMIN — LOSARTAN POTASSIUM 50 MILLIGRAM(S): 100 TABLET, FILM COATED ORAL at 06:08

## 2022-07-31 RX ADMIN — Medication 650 MILLIGRAM(S): at 18:19

## 2022-07-31 RX ADMIN — Medication 650 MILLIGRAM(S): at 06:00

## 2022-07-31 RX ADMIN — HEPARIN SODIUM 5000 UNIT(S): 5000 INJECTION INTRAVENOUS; SUBCUTANEOUS at 21:48

## 2022-07-31 RX ADMIN — HEPARIN SODIUM 5000 UNIT(S): 5000 INJECTION INTRAVENOUS; SUBCUTANEOUS at 06:08

## 2022-07-31 RX ADMIN — Medication 650 MILLIGRAM(S): at 06:08

## 2022-07-31 RX ADMIN — HEPARIN SODIUM 5000 UNIT(S): 5000 INJECTION INTRAVENOUS; SUBCUTANEOUS at 14:00

## 2022-07-31 NOTE — PROGRESS NOTE ADULT - ASSESSMENT
ASSESSMENT/PLAN    Rehab of multitrauma after fall with resulting displaced C1 vertebrae, c2 odontoid fracture, and scalp hematoma/ laceration with significant decline in function    #C1/ C2 vertebra fracture, type II dens fracture   - Pain: Tylenol PRN  - [place] J collar in place all times  - Neurosurgery consult: no neurosurgical intervention, f/u w/ Dr. Pulido in 2 weeks outpatient  - CTA: The vertebral arteries are patent, without evidence of vascular occlusion, extravasation, or dissection. mild compression upon the right vertebral artery by the inferior corner of the lateral mass of C1, which is mildly displaced laterally  - continue to monitor neuro status closely   - c/w pt and ot; participating well     #Osteoarthritis of knees  - likely cause of fall  - ther-ex, prn Tylenol    #Head Trauma  - forehead hematoma/ laceration/ ecchymosis  - no intracranial trauma, LOC or evidence of concussion syndrome  - monitor    #Hypertension   - c/w home losartan 50mg daily   - inpt bp goal <140/90    #Osteoporosis  - on home aldredonate   - not available in hospital    #Leukocytosis   - likely reactive 2/2 to trauma  - downtrending; c/w weekly cbc      -Pain control: tylenol PRN     -Skin: monitor laceration/ contusion    -FEN: labs stable.     - Diet: DASH

## 2022-07-31 NOTE — PROGRESS NOTE ADULT - SUBJECTIVE AND OBJECTIVE BOX
Patient is a 89y old  Female who presents with a chief complaint of rehabilitation of multitrauma after fall with resulting displaced C1 vertebrae, c2 odontoid fracture, and scalp hematoma with significant decline in function (24 Jul 2022 11:32)  HPI:  This is a 89yo PMH of HTN, aortic aneurysm who presented as a trauma after a fall. Patient endorsed she was taking her blood pressure medication and then fell forward and hit her head on the counter. No LOC, and no AC. She did not fall to the ground, and was able to ambulate. She had associated head and neck pain that was non-radiating. Patient sought medical attention at [facility] for a forehead laceration that was actively bleeding which she sustained after the trauma. Imaging was done revealed multiple traumas including multiple acute displaced fractures of C1 vertebrae with mild compression of right vertebral artery, and C2 type II odontoid fracture with angulation of superior fracture fragment, supraorbital scalp hematoma. Patient was transferred to [facility] and admitted to SICU.  Neurosurgery was consulted, and no surgical intervention was recommended; just to wear cervical MIAMI J collar at all times. Of note also patient had a fall previous to this in Feb 2022, after which went for rehab and then required a cane and walker for ambulation but still was independent with ADLs; prior to that fall she was independent. Patient was evaluated by physical therapy and occupational and was found to be CG RW 30',  transfer min assist, bed mobility mod assist, bathing max assist, upper body and lower body dressing mod assist, toilet hygiene mod assist, and grooming min assist. Patient was evaluated by physiatry and was found to be a good candidate for acute rehab given her multitrauma with significant injuries can benefit from a physiatrists supervision, and with significant decline in function from baseline can benefit from multidisciplinary therapy 3hours per day for a minimum of 5 days of week.  Patient denied chest pain, shortness of breath, dizziness, paresthesias, new weakness, memory changes, and changes in urinary and bowel habits.      TODAY'S SUBJECTIVE & REVIEW OF SYMPTOMS:  Patient seen and examined this AM with Attending physiatrist. No acute events overnight. No acute complaints. Voiding and passing stool spontaneously. Tolerating oral diet. Tolerating physical and occupational therapy. Therapies going well.   Vitals reviewed. hemodynamically stable.      Constitutional        [x ] WNL           [   ] poor appetite   [   ] insomnia   [   ] tired   Cardio:                [ x  ] WNL           [   ] CP   [   ] HINDS   [   ] palpitations               Resp:                   [ x  ] WNL           [   ] SOB   [   ] cough   [   ] wheezing   GI:                        [ x  ] WNL           [   ] constipation   [   ] diarrhea   [   ] abdominal pain   [   ] nausea   [   ] emesis                                :                      [ x  ] WNL           [   ] DYKES  [   ] dysuria   [   ] difficulty voiding             Endo:                   [ x  ] WNL          [   ] polyuria   [   ] temperature intolerance                 Skin:                     [   ] WNL          [   ] pain   [ x  ] wound - forehead hematoma decreasing in size  [   ] rash   MSK:                    [x   ] WNL          [   ] muscle pain   [   ] joint pain/ stiffness   [   ] muscle tenderness   [   ] swelling   Neuro:                 [ x  ] WNL          [   ] HA   [   ] change in vision   [   ] tremor   [   ] weakness   [   ]dysphagia              Cognitive:             [ x] WNL           [   ]confusion      Psych:                  [ x  ] WNL           [   ] hallucinations   [   ]agitation   [   ] delusion   [   ]depression      PHYSICAL EXAM  Vital Signs Last 24 Hrs  T(C): 36.3 (31 Jul 2022 05:47), Max: 37.5 (30 Jul 2022 11:59)  T(F): 97.4 (31 Jul 2022 05:47), Max: 99.5 (30 Jul 2022 11:59)  HR: 92 (31 Jul 2022 05:47) (88 - 92)  BP: 140/67 (31 Jul 2022 05:47) (140/67 - 150/70)  BP(mean): --  RR: 18 (31 Jul 2022 05:47) (18 - 18)  SpO2: --    Parameters below as of 31 Jul 2022 05:47  Patient On (Oxygen Delivery Method): room air      General: [ x ] NAD, Resting Comfortable,   [   ] other: pleasant                               HEENT: [   ] NC/AT, EOMI, PERRL , Normal Conjunctivae,   [x ] other:  left forehead with large hematoma and ecchymosis - healing; laceration above left eye covered by steristrip. b/l raccoon eyes - Improving   Cardio: [x  ] RRR, no murmur,   [   ] other:                              Pulm: [ x  ] No Respiratory Distress,  Lungs CTAB,   [   ] other:                       Abdomen: [ x ] ND/NT, Soft,   [   ] other:    : [  x ] NO DYKES CATHETER, [   ] DYKES CATHETER- no meatal tear, no discharge, [   ] other:                                            MSK: [ x  ] No joint swelling, Full ROM,   [  x ] other:  ulnar deviation of fingers b/l                                       Ext: [   x]No C/C/E, No calf tenderness,   [   ]other:    Skin: [  x ]intact,   [ x  ] other:  1/2 inch diameter mole on right hand                                                                   Neurological Examination:  Cognitive: [ x   ] AAO x 3,   [    ]  other:                                                                      Attention:  [ x  ] intact,   [    ]  other: 3/3 word recall                             Memory: [  x  ] intact,    [    ]  other:     Mood/Affect: [  x  ] wnl,    [    ]  other:                                                                             Communication: [  x  ]Fluent, no dysarthria, following commands:  [    ] other:   CN II - XII:  [   x ] intact,  [    ] other:                                                                                        Motor:   RIGHT UE: [   ] WNL,  [   ] other: 4/5   LEFT    UE: [   ] WNL,  [   ] other: 4/5  RIGHT LE: [   ] WNL,  [   ] other: 4/5  LEFT    LE: [   ] WNL,  [   ] other: 4/5  2+/5 b/l shoulders   Tone: [   x ] wnl,   [    ]  other:  DTRs: [x   ]symmetric, [   ] other:  Coordination:   [ x   ] intact,   [    ] other:                                                               Sensory: [ x   ] Intact to light touch,   [    ] other:    MEDICATIONS  (STANDING):  acetaminophen     Tablet .. 650 milliGRAM(s) Oral every 12 hours  heparin   Injectable 5000 Unit(s) SubCutaneous every 8 hours  losartan 50 milliGRAM(s) Oral daily    MEDICATIONS  (PRN):  acetaminophen     Tablet .. 650 milliGRAM(s) Oral every 6 hours PRN Severe Pain (7 - 10)  melatonin 3 milliGRAM(s) Oral at bedtime PRN Insomnia  senna 2 Tablet(s) Oral at bedtime PRN Constipation      RECENT LABS/IMAGING       no new labs

## 2022-08-01 ENCOUNTER — TRANSCRIPTION ENCOUNTER (OUTPATIENT)
Age: 87
End: 2022-08-01

## 2022-08-01 DIAGNOSIS — S12.090A OTHER DISPLACED FRACTURE OF FIRST CERVICAL VERTEBRA, INITIAL ENCOUNTER FOR CLOSED FRACTURE: ICD-10-CM
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
## 2022-08-01 DIAGNOSIS — Z88.0 ALLERGY STATUS TO PENICILLIN: ICD-10-CM

## 2022-08-01 DIAGNOSIS — Z88.2 ALLERGY STATUS TO SULFONAMIDES: ICD-10-CM

## 2022-08-01 DIAGNOSIS — S12.111A POSTERIOR DISPLACED TYPE II DENS FRACTURE, INITIAL ENCOUNTER FOR CLOSED FRACTURE: ICD-10-CM

## 2022-08-01 DIAGNOSIS — J47.9 BRONCHIECTASIS, UNCOMPLICATED: ICD-10-CM

## 2022-08-01 DIAGNOSIS — S01.81XA LACERATION WITHOUT FOREIGN BODY OF OTHER PART OF HEAD, INITIAL ENCOUNTER: ICD-10-CM

## 2022-08-01 DIAGNOSIS — I10 ESSENTIAL (PRIMARY) HYPERTENSION: ICD-10-CM
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
## 2022-08-01 DIAGNOSIS — I71.2 THORACIC AORTIC ANEURYSM, WITHOUT RUPTURE: ICD-10-CM

## 2022-08-01 DIAGNOSIS — I48.91 UNSPECIFIED ATRIAL FIBRILLATION: ICD-10-CM

## 2022-08-01 DIAGNOSIS — I77.1 STRICTURE OF ARTERY: ICD-10-CM

## 2022-08-01 LAB
ALBUMIN SERPL ELPH-MCNC: 3.7 G/DL — SIGNIFICANT CHANGE UP (ref 3.5–5.2)
ALP SERPL-CCNC: 101 U/L — SIGNIFICANT CHANGE UP (ref 30–115)
ALT FLD-CCNC: 39 U/L — SIGNIFICANT CHANGE UP (ref 0–41)
ANION GAP SERPL CALC-SCNC: 12 MMOL/L — SIGNIFICANT CHANGE UP (ref 7–14)
AST SERPL-CCNC: 27 U/L — SIGNIFICANT CHANGE UP (ref 0–41)
BASOPHILS # BLD AUTO: 0.05 K/UL — SIGNIFICANT CHANGE UP (ref 0–0.2)
BASOPHILS NFR BLD AUTO: 0.4 % — SIGNIFICANT CHANGE UP (ref 0–1)
BILIRUB SERPL-MCNC: 0.3 MG/DL — SIGNIFICANT CHANGE UP (ref 0.2–1.2)
BUN SERPL-MCNC: 15 MG/DL — SIGNIFICANT CHANGE UP (ref 10–20)
CALCIUM SERPL-MCNC: 9.1 MG/DL — SIGNIFICANT CHANGE UP (ref 8.5–10.1)
CHLORIDE SERPL-SCNC: 100 MMOL/L — SIGNIFICANT CHANGE UP (ref 98–110)
CO2 SERPL-SCNC: 29 MMOL/L — SIGNIFICANT CHANGE UP (ref 17–32)
CREAT SERPL-MCNC: 0.6 MG/DL — LOW (ref 0.7–1.5)
EGFR: 84 ML/MIN/1.73M2 — SIGNIFICANT CHANGE UP
EOSINOPHIL # BLD AUTO: 0.14 K/UL — SIGNIFICANT CHANGE UP (ref 0–0.7)
EOSINOPHIL NFR BLD AUTO: 1.2 % — SIGNIFICANT CHANGE UP (ref 0–8)
GLUCOSE SERPL-MCNC: 161 MG/DL — HIGH (ref 70–99)
HCT VFR BLD CALC: 39.9 % — SIGNIFICANT CHANGE UP (ref 37–47)
HGB BLD-MCNC: 13.2 G/DL — SIGNIFICANT CHANGE UP (ref 12–16)
IMM GRANULOCYTES NFR BLD AUTO: 0.4 % — HIGH (ref 0.1–0.3)
LYMPHOCYTES # BLD AUTO: 1.66 K/UL — SIGNIFICANT CHANGE UP (ref 1.2–3.4)
LYMPHOCYTES # BLD AUTO: 13.7 % — LOW (ref 20.5–51.1)
MAGNESIUM SERPL-MCNC: 1.9 MG/DL — SIGNIFICANT CHANGE UP (ref 1.8–2.4)
MCHC RBC-ENTMCNC: 30.3 PG — SIGNIFICANT CHANGE UP (ref 27–31)
MCHC RBC-ENTMCNC: 33.1 G/DL — SIGNIFICANT CHANGE UP (ref 32–37)
MCV RBC AUTO: 91.7 FL — SIGNIFICANT CHANGE UP (ref 81–99)
MONOCYTES # BLD AUTO: 1.23 K/UL — HIGH (ref 0.1–0.6)
MONOCYTES NFR BLD AUTO: 10.2 % — HIGH (ref 1.7–9.3)
NEUTROPHILS # BLD AUTO: 8.96 K/UL — HIGH (ref 1.4–6.5)
NEUTROPHILS NFR BLD AUTO: 74.1 % — SIGNIFICANT CHANGE UP (ref 42.2–75.2)
NRBC # BLD: 0 /100 WBCS — SIGNIFICANT CHANGE UP (ref 0–0)
PLATELET # BLD AUTO: 387 K/UL — SIGNIFICANT CHANGE UP (ref 130–400)
POTASSIUM SERPL-MCNC: 4.4 MMOL/L — SIGNIFICANT CHANGE UP (ref 3.5–5)
POTASSIUM SERPL-SCNC: 4.4 MMOL/L — SIGNIFICANT CHANGE UP (ref 3.5–5)
PROT SERPL-MCNC: 6.1 G/DL — SIGNIFICANT CHANGE UP (ref 6–8)
RBC # BLD: 4.35 M/UL — SIGNIFICANT CHANGE UP (ref 4.2–5.4)
RBC # FLD: 14.7 % — HIGH (ref 11.5–14.5)
SARS-COV-2 RNA SPEC QL NAA+PROBE: SIGNIFICANT CHANGE UP
SODIUM SERPL-SCNC: 141 MMOL/L — SIGNIFICANT CHANGE UP (ref 135–146)
WBC # BLD: 12.09 K/UL — HIGH (ref 4.8–10.8)
WBC # FLD AUTO: 12.09 K/UL — HIGH (ref 4.8–10.8)

## 2022-08-01 RX ORDER — LOSARTAN POTASSIUM 100 MG/1
1 TABLET, FILM COATED ORAL
Qty: 0 | Refills: 0 | DISCHARGE

## 2022-08-01 RX ORDER — HEPARIN SODIUM 5000 [USP'U]/ML
5000 INJECTION INTRAVENOUS; SUBCUTANEOUS
Qty: 0 | Refills: 0 | DISCHARGE
Start: 2022-08-01

## 2022-08-01 RX ORDER — ALENDRONATE SODIUM 70 MG/1
1 TABLET ORAL
Qty: 0 | Refills: 0 | DISCHARGE

## 2022-08-01 RX ORDER — SENNA PLUS 8.6 MG/1
2 TABLET ORAL
Qty: 0 | Refills: 0 | DISCHARGE
Start: 2022-08-01

## 2022-08-01 RX ORDER — LANOLIN ALCOHOL/MO/W.PET/CERES
1 CREAM (GRAM) TOPICAL
Qty: 0 | Refills: 0 | DISCHARGE
Start: 2022-08-01

## 2022-08-01 RX ADMIN — HEPARIN SODIUM 5000 UNIT(S): 5000 INJECTION INTRAVENOUS; SUBCUTANEOUS at 06:56

## 2022-08-01 RX ADMIN — HEPARIN SODIUM 5000 UNIT(S): 5000 INJECTION INTRAVENOUS; SUBCUTANEOUS at 21:12

## 2022-08-01 RX ADMIN — LOSARTAN POTASSIUM 50 MILLIGRAM(S): 100 TABLET, FILM COATED ORAL at 06:56

## 2022-08-01 RX ADMIN — Medication 650 MILLIGRAM(S): at 17:59

## 2022-08-01 RX ADMIN — Medication 650 MILLIGRAM(S): at 07:30

## 2022-08-01 RX ADMIN — Medication 650 MILLIGRAM(S): at 06:56

## 2022-08-01 RX ADMIN — Medication 650 MILLIGRAM(S): at 18:45

## 2022-08-01 RX ADMIN — HEPARIN SODIUM 5000 UNIT(S): 5000 INJECTION INTRAVENOUS; SUBCUTANEOUS at 12:46

## 2022-08-01 NOTE — DISCHARGE NOTE PROVIDER - NSDCCPCAREPLAN_GEN_ALL_CORE_FT
PRINCIPAL DISCHARGE DIAGNOSIS  Diagnosis: Head trauma  Assessment and Plan of Treatment: You had a fall at home and resulted in laceration of forhead and subcutaneous hematoma . Trauma doctor saw you and cared you .  the hematoma will heal eventually , please observe fall precaurtions , ambulate with cane or walker  or as per your surgeon/ doctor , continue rehab therapy      SECONDARY DISCHARGE DIAGNOSES  Diagnosis: Closed fracture of multiple cervical vertebrae  Assessment and Plan of Treatment: This is a result of Fall happened at home , You are found to have C1 and c2 fracture, after seen by  a Neurosurgeon you were given A hard collar  ( [place]           J collar ) to wear all the times and continue rehab therapy and follow up with neurosurgeon in 2 weeks , please rinform surgeon if worsening of fracture status, increase numbness or pain arms and neck , dizziness etc to your doctor , may take pain meds tylenol for pain .    Diagnosis: Hypertension  Assessment and Plan of Treatment: Pleas econtinue losartan an ddiet control-low sodium and low fat diet  and medical follow up in 2 to 4 weeks with pmd .    Diagnosis: DVT prophylaxis  Assessment and Plan of Treatment: You are on  heparin subcutaneous injection  . Please continue  to take it until you are able to ambulate well .    Diagnosis: At risk for osteoporosis  Assessment and Plan of Treatment: Please continue taking fosamax /Aldronade and follow up with your pmd in 4 weeks ..  May take calcium rich foods to help with bone thickness.     PRINCIPAL DISCHARGE DIAGNOSIS  Diagnosis: Head trauma  Assessment and Plan of Treatment: You had a fall at home that resulted in laceration of forehead and subcutaneous hematoma (blood under the skin, in the soft tissues, from a bruise to the area) . Trauma doctor saw you and cared for you .  The hematoma will heal eventually , please observe fall precautions , ambulate with cane or walker  or as per your surgeon/ doctor , continue rehab therapy      SECONDARY DISCHARGE DIAGNOSES  Diagnosis: Closed fracture of multiple cervical vertebrae  Assessment and Plan of Treatment: This is a result of Fall happened at home , You are found to have C1 and C2 fracture (fracture of the first two bones of the spine in your neck), after seen by  a Neurosurgeon you were given A hard collar called   [place] J collar  to wear all the times and continue rehab therapy and follow up with neurosurgeon in 2 weeks , please inform surgeon if worsening of fracture status, increased numbness, weakness or pain arms and neck , dizziness etc to your doctor , may take pain meds as needed, tylenol for pain .    Diagnosis: Hypertension  Assessment and Plan of Treatment: Please continue losartan and diet control-low sodium and low fat diet  and medical follow up in 2 to 4 weeks with pmd Dr. Frey    Diagnosis: DVT prophylaxis  Assessment and Plan of Treatment: You are on  heparin subcutaneous injection  . Please continue  to take it until you are able to ambulate well .    Diagnosis: At risk for osteoporosis  Assessment and Plan of Treatment: Please continue taking fosamax /Aldronade and follow up with your pmd in 4 weeks ..  May take calcium rich foods to help with bone thickness.

## 2022-08-01 NOTE — DISCHARGE NOTE PROVIDER - HOSPITAL COURSE
HPI:  This is a 89yo PMH of HTN, aortic aneurysm who presented as a trauma after a fall. Patient endorsed she was taking her blood pressure medication and then fell forward and hit her head on the counter. No LOC, and no AC. She did not fall to the ground, and was able to ambulate. She had associated head and neck pain that was non-radiating. Patient sought medical attention at [facility] for a forehead laceration that was actively bleeding which she sustained after the trauma. Imaging was done revealed multiple traumas including multiple acute displaced fractures of C1 vertebrae with mild compression of right vertebral artery, and C2 type II odontoid fracture with angulation of superior fracture fragment, supraorbital scalp hematoma. Patient was transferred to [facility] and admitted to SICU.  Neurosurgery was consulted, and no surgical intervention was recommended; just to wear cervical MIAMI J collar at all times. Of note also patient had a fall previous to this in Feb 2022, after which went for rehab and then required a cane and walker for ambulation but still was independent with ADLs; prior to that fall she was independent. Patient was evaluated by physical therapy and occupational and was found to be CG RW 30',  transfer min assist, bed mobility mod assist, bathing max assist, upper body and lower body dressing mod assist, toilet hygiene mod assist, and grooming min assist. Patient was evaluated by physiatry and was found to be a good candidate for acute rehab given her multitrauma with significant injuries can benefit from a physiatrists supervision, and with significant decline in function from baseline can benefit from multidisciplinary therapy 3hours per day for a minimum of 5 days of week.  Patient denied chest pain, shortness of breath, dizziness, paresthesias, new weakness, memory changes, and changes in urinary and bowel habits.      Rehab of multitrauma after fall with resulting displaced C1 vertebrae, c2 odontoid fracture, and scalp hematoma/ laceration with significant decline in function    #C1/ C2 vertebra fracture, type II dens fracture   - Pain: Tylenol PRN  - [place] J collar in place all times  - Neurosurgery consult: no neurosurgical intervention, f/u w/ Dr. Pulido in 2 weeks outpatient  - CTA: The vertebral arteries are patent, without evidence of vascular occlusion, extravasation, or dissection. mild compression upon the right vertebral artery by the inferior corner of the lateral mass of C1, which is mildly displaced laterally  - continue to monitor neuro status closely   - c/w pt and ot; participating well     #Osteoarthritis of knees  - likely cause of fall  - ther-ex, prn Tylenol    #Head Trauma  - forehead hematoma/ laceration/ ecchymosis  - no intracranial trauma, LOC or evidence of concussion syndrome  - monitor    #Hypertension   - c/w home losartan 50mg daily   - stable    #Osteoporosis  - on home aldredonate   - not available in hospital    #Leukocytosis   - likely reactive 2/2 to trauma  - downtrending; c/w weekly cbc      -Pain control: tylenol PRN     -Skin: monitor laceration/ contusion    -FEN: labs pending    - Diet: DASH   HPI:  This is a 89yo PMH of HTN, aortic aneurysm who presented as a trauma after a fall. Patient endorsed she was taking her blood pressure medication and then fell forward and hit her head on the counter. No LOC, and no AC. She did not fall to the ground, and was able to ambulate. She had associated head and neck pain that was non-radiating. Patient sought medical attention at [facility] for a forehead laceration that was actively bleeding which she sustained after the trauma. Imaging was done revealed multiple traumas including multiple acute displaced fractures of C1 vertebrae with mild compression of right vertebral artery, and C2 type II odontoid fracture with angulation of superior fracture fragment, supraorbital scalp hematoma. Patient was transferred to [facility] and admitted to SICU.  Neurosurgery was consulted, and no surgical intervention was recommended; just to wear cervical MIAMI J collar at all times. Of note also patient had a fall previous to this in Feb 2022, after which went for rehab and then required a cane and walker for ambulation but still was independent with ADLs; prior to that fall she was independent. Patient was evaluated by physical therapy and occupational and was found to be CG RW 30',  transfer min assist, bed mobility mod assist, bathing max assist, upper body and lower body dressing mod assist, toilet hygiene mod assist, and grooming min assist. Patient was evaluated by physiatry and was found to be a good candidate for acute rehab given her multitrauma with significant injuries can benefit from a physiatrists supervision, and with significant decline in function from baseline can benefit from multidisciplinary therapy 3hours per day for a minimum of 5 days of week.  Patient denied chest pain, shortness of breath, dizziness, paresthesias, new weakness, memory changes, and changes in urinary and bowel habits.      Rehab of multitrauma after fall with resulting displaced C1 vertebrae, c2 odontoid fracture, and scalp hematoma/ laceration with significant decline in function    #C1/ C2 vertebra fracture, type II dens fracture   - Pain: Tylenol PRN  - [place] J collar in place all times  - Neurosurgery consult: no neurosurgical intervention, f/u w/ Dr. Pulido in 2 weeks outpatient  - CTA: The vertebral arteries are patent, without evidence of vascular occlusion, extravasation, or dissection. mild compression upon the right vertebral artery by the inferior corner of the lateral mass of C1, which is mildly displaced laterally  - continue to monitor neuro status closely   - c/w pt and ot; participating well     #Osteoarthritis of knees  - likely cause of fall  - ther-ex, prn Tylenol    #Head Trauma  - forehead hematoma/ laceration/ ecchymosis  - no intracranial trauma, LOC or evidence of concussion syndrome  - monitor    #Hypertension   - c/w home losartan 50mg daily   - stable    #Osteoporosis  - on home aldredonate -- resume upon discharge  - not available in hospital    #Leukocytosis   - likely reactive 2/2 to trauma  - downtrending; c/w weekly cbc      -Pain control: tylenol PRN     -Skin: monitor laceration/ contusion    -FEN: labs pending    - Diet: DASH    The patient is being discharged to [facility] facility on 8/2/22. She will need to follow up with Neurosurgery Dr. Pulido in two weeks and with her PCP.   HPI:  This is a 89yo PMH of HTN, aortic aneurysm who presented as a trauma after a fall. Patient endorsed she was taking her blood pressure medication and then fell forward and hit her head on the counter. No LOC, and no AC. She did not fall to the ground, and was able to ambulate. She had associated head and neck pain that was non-radiating. Patient sought medical attention at [facility] for a forehead laceration that was actively bleeding which she sustained after the trauma. Imaging was done revealed multiple traumas including multiple acute displaced fractures of C1 vertebrae with mild compression of right vertebral artery, and C2 type II odontoid fracture with angulation of superior fracture fragment, supraorbital scalp hematoma. Patient was transferred to [facility] and admitted to SICU.  Neurosurgery was consulted, and no surgical intervention was recommended; just to wear cervical MIAMI J collar at all times. Of note also patient had a fall previous to this in Feb 2022, after which went for rehab and then required a cane and walker for ambulation but still was independent with ADLs; prior to that fall she was independent. Patient was evaluated by physical therapy and occupational and was found to be CG RW 30',  transfer min assist, bed mobility mod assist, bathing max assist, upper body and lower body dressing mod assist, toilet hygiene mod assist, and grooming min assist. Patient was evaluated by physiatry and was found to be a good candidate for acute rehab given her multitrauma with significant injuries can benefit from a physiatrists supervision, and with significant decline in function from baseline can benefit from multidisciplinary therapy 3hours per day for a minimum of 5 days of week.  Patient denied chest pain, shortness of breath, dizziness, paresthesias, new weakness, memory changes, and changes in urinary and bowel habits.      Rehab of multitrauma after fall with resulting displaced C1 vertebrae, c2 odontoid fracture, and scalp hematoma/ laceration with significant decline in function    #C1/ C2 vertebra fracture, type II dens fracture   - Pain: Tylenol PRN  - [place] J collar in place all times  - Neurosurgery consult: no neurosurgical intervention, f/u w/ Dr. Pulido in 2 weeks outpatient  - CTA: The vertebral arteries are patent, without evidence of vascular occlusion, extravasation, or dissection. mild compression upon the right vertebral artery by the inferior corner of the lateral mass of C1, which is mildly displaced laterally  - continue to monitor neuro status closely   - c/w pt and ot; participating well     #Osteoarthritis of knees  - likely cause of fall  - ther-ex, prn Tylenol    #Head Trauma  - forehead hematoma/ laceration/ ecchymosis  - no intracranial trauma, LOC or evidence of concussion syndrome  - monitor    #Hypertension   - c/w home losartan 50mg daily   - stable    #Osteoporosis  - on home aldredonate -- resume upon discharge  - not available in hospital    #Leukocytosis   - likely reactive 2/2 to trauma  - downtrending; c/w weekly cbc      -Pain control: tylenol PRN     -Skin: monitor laceration/ contusion    -FEN: labs pending    - Diet: DASH    The patient is being discharged to [facility] facility on 8/2/22. She will need to follow up with Neurosurgery Dr. Pulido in two weeks and with her PCP Dr. Frey. HPI:  This is a 89yo PMH of HTN, aortic aneurysm who presented as a trauma after a fall. Patient endorsed she was taking her blood pressure medication and then fell forward and hit her head on the counter. No LOC, and no AC. She did not fall to the ground, and was able to ambulate. She had associated head and neck pain that was non-radiating. Patient sought medical attention at [facility] for a forehead laceration that was actively bleeding which she sustained after the trauma. Imaging was done revealed multiple traumas including multiple acute displaced fractures of C1 vertebrae with mild compression of right vertebral artery, and C2 type II odontoid fracture with angulation of superior fracture fragment, supraorbital scalp hematoma. Patient was transferred to [facility] and admitted to SICU.  Neurosurgery was consulted, and no surgical intervention was recommended; just to wear cervical MIAMI J collar at all times. Of note also patient had a fall previous to this in Feb 2022, after which went for rehab and then required a cane and walker for ambulation but still was independent with ADLs; prior to that fall she was independent. Patient was evaluated by physical therapy and occupational and was found to be CG RW 30',  transfer min assist, bed mobility mod assist, bathing max assist, upper body and lower body dressing mod assist, toilet hygiene mod assist, and grooming min assist. Patient was evaluated by physiatry and was found to be a good candidate for acute rehab given her multitrauma with significant injuries can benefit from a physiatrists supervision, and with significant decline in function from baseline can benefit from multidisciplinary therapy 3hours per day for a minimum of 5 days of week.  Patient denied chest pain, shortness of breath, dizziness, paresthesias, new weakness, memory changes, and changes in urinary and bowel habits.      Rehab of multitrauma after fall with resulting displaced C1 vertebrae, c2 odontoid fracture, and scalp hematoma/ laceration with significant decline in function    #C1/ C2 vertebra fracture, type II dens fracture   - Pain: Tylenol PRN  - [place] J collar in place all times  - Neurosurgery consult: no neurosurgical intervention, f/u w/ Dr. Pulido in 2 weeks outpatient  - CTA: The vertebral arteries are patent, without evidence of vascular occlusion, extravasation, or dissection. mild compression upon the right vertebral artery by the inferior corner of the lateral mass of C1, which is mildly displaced laterally  - continue to monitor neuro status closely   - c/w pt and ot; participating well     #Osteoarthritis of knees  - likely cause of fall  - ther-ex, prn Tylenol    #Head Trauma  - forehead hematoma/ laceration/ ecchymosis  - no intracranial trauma, LOC or evidence of concussion syndrome  - monitor    #Hypertension   - c/w home losartan 50mg daily   - stable    #Osteoporosis  - on home aldredonate -- resume upon discharge  - not available in hospital    #Leukocytosis   - likely reactive 2/2 to trauma  - downtrending; c/w weekly cbc      -Pain control: tylenol PRN     -Skin: monitor laceration/ contusion    -FEN: labs pending    - Diet: DASH  - COVID was negative 7/29 AND 8/1/22.    The patient is being discharged to [facility] facility on 8/2/22. She will need to follow up with Neurosurgery Dr. Pulido in two weeks and with her PCP Dr. Frey.

## 2022-08-01 NOTE — DISCHARGE NOTE PROVIDER - CARE PROVIDERS DIRECT ADDRESSES
,[email or web address],DirectAddress_Unknown ,[email or web address],DirectAddress_Unknown,DirectAddress_Unknown

## 2022-08-01 NOTE — DISCHARGE NOTE PROVIDER - CARE PROVIDER_API CALL
Tiara Pulido)  Neurosurgery  [address] [facility] [address] [address] [facility], [address]  [place] [address]  Phone: [phone]  Fax: [phone]  Follow Up Time:     your PMD,   Phone: (   )    -  Fax: (   )    -  Follow Up Time:    Tiara Pulido)  Neurosurgery  [address], [address]  [place] [address]  Phone: [phone]  Fax: [phone]  Follow Up Time:     Dax Frey)  Internal Medicine  [address] [place] [address]  Phone: [phone]  Fax: [phone]  Follow Up Time:     trauma surgery,   Follow-up Clinics	[facility]  Trauma Surgery  [address], [place] [address]  Phone: [phone]  Fax:  Phone: (   )    -  Fax: (   )    -  Follow Up Time:

## 2022-08-01 NOTE — DISCHARGE NOTE PROVIDER - PROVIDER TOKENS
PROVIDER:[TOKEN:[[id number]]],FREE:[LAST:[your PMD],PHONE:[(   )    -],FAX:[(   )    -]] PROVIDER:[TOKEN:[[id number]]],PROVIDER:[TOKEN:[[id number]]],FREE:[LAST:[trauma surgery],PHONE:[(   )    -],FAX:[(   )    -],ADDRESS:[Follow-up Clinics	[facility]  Trauma Surgery  [address] [place] [address]  Phone: [phone]  Fax:]]

## 2022-08-01 NOTE — PROGRESS NOTE ADULT - ASSESSMENT
ASSESSMENT/PLAN    Rehab of multitrauma after fall with resulting displaced C1 vertebrae, c2 odontoid fracture, and scalp hematoma/ laceration with significant decline in function    #C1/ C2 vertebra fracture, type II dens fracture   - Pain: Tylenol PRN  - [place] J collar in place all times  - Neurosurgery consult: no neurosurgical intervention, f/u w/ Dr. Pulido in 2 weeks outpatient  - CTA: The vertebral arteries are patent, without evidence of vascular occlusion, extravasation, or dissection. mild compression upon the right vertebral artery by the inferior corner of the lateral mass of C1, which is mildly displaced laterally  - continue to monitor neuro status closely   - c/w pt and ot; participating well     #Osteoarthritis of knees  - likely cause of fall  - ther-ex, prn Tylenol    #Head Trauma  - forehead hematoma/ laceration/ ecchymosis  - no intracranial trauma, LOC or evidence of concussion syndrome  - monitor    #Hypertension   - c/w home losartan 50mg daily   - stable    #Osteoporosis  - on home aldredonate   - not available in hospital    #Leukocytosis   - likely reactive 2/2 to trauma  - downtrending; c/w weekly cbc      -Pain control: tylenol PRN     -Skin: monitor laceration/ contusion    -FEN: labs pending    - Diet: DASH

## 2022-08-01 NOTE — DISCHARGE NOTE PROVIDER - NSDCMRMEDTOKEN_GEN_ALL_CORE_FT
acetaminophen 325 mg oral tablet: 2 tab(s) orally every 6 hours, As needed, for pain or fever  alendronate 10 mg oral tablet: 1 tab(s) orally once a day at 6AM--take in upright position with 8 ounces of water and remain upright x 60 minutes after taking it; no food or other meds for 60 minutes after taking it  heparin: 5000 unit(s) subcutaneous every 8 hours  losartan 50 mg oral tablet: 1 tab(s) orally once a day -- PLEASE HOLD FOR SBP less than 120  melatonin 3 mg oral tablet: 1 tab(s) orally once a day (at bedtime), As needed, Insomnia  senna leaf extract oral tablet: 2 tab(s) orally once a day (at bedtime), As needed, Constipation

## 2022-08-01 NOTE — PROGRESS NOTE ADULT - SUBJECTIVE AND OBJECTIVE BOX
Patient is a 89y old  Female who presents with a chief complaint of rehabilitation of multitrauma after fall with resulting displaced C1 vertebrae, c2 odontoid fracture, and scalp hematoma with significant decline in function (24 Jul 2022 11:32)  HPI:  This is a 89yo PMH of HTN, aortic aneurysm who presented as a trauma after a fall. Patient endorsed she was taking her blood pressure medication and then fell forward and hit her head on the counter. No LOC, and no AC. She did not fall to the ground, and was able to ambulate. She had associated head and neck pain that was non-radiating. Patient sought medical attention at [facility] for a forehead laceration that was actively bleeding which she sustained after the trauma. Imaging was done revealed multiple traumas including multiple acute displaced fractures of C1 vertebrae with mild compression of right vertebral artery, and C2 type II odontoid fracture with angulation of superior fracture fragment, supraorbital scalp hematoma. Patient was transferred to [facility] and admitted to SICU.  Neurosurgery was consulted, and no surgical intervention was recommended; just to wear cervical MIAMI J collar at all times. Of note also patient had a fall previous to this in Feb 2022, after which went for rehab and then required a cane and walker for ambulation but still was independent with ADLs; prior to that fall she was independent. Patient was evaluated by physical therapy and occupational and was found to be CG RW 30',  transfer min assist, bed mobility mod assist, bathing max assist, upper body and lower body dressing mod assist, toilet hygiene mod assist, and grooming min assist. Patient was evaluated by physiatry and was found to be a good candidate for acute rehab given her multitrauma with significant injuries can benefit from a physiatrists supervision, and with significant decline in function from baseline can benefit from multidisciplinary therapy 3hours per day for a minimum of 5 days of week.  Patient denied chest pain, shortness of breath, dizziness, paresthesias, new weakness, memory changes, and changes in urinary and bowel habits.      TODAY'S SUBJECTIVE & REVIEW OF SYMPTOMS:  VSS. No new complaints. Alert and in good spirits. Denies pain. Ambulates with RW with CG.     Constitutional        [x ] WNL           [   ] poor appetite   [   ] insomnia   [   ] tired   Cardio:                [ x  ] WNL           [   ] CP   [   ] HINDS   [   ] palpitations               Resp:                   [ x  ] WNL           [   ] SOB   [   ] cough   [   ] wheezing   GI:                        [ x  ] WNL           [   ] constipation   [   ] diarrhea   [   ] abdominal pain   [   ] nausea   [   ] emesis                                :                      [ x  ] WNL           [   ] DYKES  [   ] dysuria   [   ] difficulty voiding             Endo:                   [ x  ] WNL          [   ] polyuria   [   ] temperature intolerance                 Skin:                     [   ] WNL          [   ] pain   [ x  ] wound - forehead hematoma decreasing in size  [   ] rash   MSK:                    [x   ] WNL          [   ] muscle pain   [   ] joint pain/ stiffness   [   ] muscle tenderness   [   ] swelling   Neuro:                 [ x  ] WNL          [   ] HA   [   ] change in vision   [   ] tremor   [   ] weakness   [   ]dysphagia              Cognitive:             [ x] WNL           [   ]confusion      Psych:                  [ x  ] WNL           [   ] hallucinations   [   ]agitation   [   ] delusion   [   ]depression      PHYSICAL EXAM  Vital Signs Last 24 Hrs  T(C): 36.3 (31 Jul 2022 05:47), Max: 37.5 (30 Jul 2022 11:59)  T(F): 97.4 (31 Jul 2022 05:47), Max: 99.5 (30 Jul 2022 11:59)  HR: 92 (31 Jul 2022 05:47) (88 - 92)  BP: 140/67 (31 Jul 2022 05:47) (140/67 - 150/70)  BP(mean): --  RR: 18 (31 Jul 2022 05:47) (18 - 18)  SpO2: --    Parameters below as of 31 Jul 2022 05:47  Patient On (Oxygen Delivery Method): room air      General: [ x ] NAD, Resting Comfortable,   [  x ] other: in hard cervical collar                        HEENT: [   ] NC/AT, EOMI, PERRL , Normal Conjunctivae,   [x ] other:  left forehead with large hematoma and ecchymosis - healing; laceration above left eye covered by steristrip. b/l raccoon eyes - Improving   Cardio: [x  ] RRR, no murmur,   [   ] other:                              Pulm: [ x  ] No Respiratory Distress,  Lungs CTAB,   [   ] other:                       Abdomen: [ x ] ND/NT, Soft,   [   ] other:    : [  x ] NO DYKES CATHETER, [   ] DYKES CATHETER- no meatal tear, no discharge, [   ] other:                                            MSK: [ x  ] No joint swelling, Full ROM,   [  x ] other:  ulnar deviation of fingers b/l                                       Ext: [   x]No C/C/E, No calf tenderness,   [   ]other:    Skin: [  x ]intact,   [ x  ] other:  1/2 inch diameter mole on right hand                                                                   Neurological Examination:  Cognitive: [ x   ] AAO x 3,   [    ]  other:                                                                      Attention:  [ x  ] intact,   [    ]  other: 3/3 word recall                             Memory: [  x  ] intact,    [    ]  other:     Mood/Affect: [  x  ] wnl,    [    ]  other:                                                                             Communication: [  x  ]Fluent, no dysarthria, following commands:  [    ] other:   CN II - XII:  [   x ] intact,  [    ] other:                                                                                        Motor:   RIGHT UE: [   ] WNL,  [   ] other: 4/5   LEFT    UE: [   ] WNL,  [   ] other: 4/5  RIGHT LE: [   ] WNL,  [   ] other: 4/5  LEFT    LE: [   ] WNL,  [   ] other: 4/5  2+/5 b/l shoulders   Tone: [   x ] wnl,   [    ]  other:  DTRs: [x   ]symmetric, [   ] other:  Coordination:   [ x   ] intact,   [    ] other:                                                               Sensory: [ x   ] Intact to light touch,   [    ] other:    MEDICATIONS  (STANDING):  acetaminophen     Tablet .. 650 milliGRAM(s) Oral every 12 hours  heparin   Injectable 5000 Unit(s) SubCutaneous every 8 hours  losartan 50 milliGRAM(s) Oral daily    MEDICATIONS  (PRN):  acetaminophen     Tablet .. 650 milliGRAM(s) Oral every 6 hours PRN Severe Pain (7 - 10)  melatonin 3 milliGRAM(s) Oral at bedtime PRN Insomnia  senna 2 Tablet(s) Oral at bedtime PRN Constipation      RECENT LABS/IMAGING  pend

## 2022-08-02 ENCOUNTER — TRANSCRIPTION ENCOUNTER (OUTPATIENT)
Age: 87
End: 2022-08-02

## 2022-08-02 VITALS
HEART RATE: 88 BPM | RESPIRATION RATE: 18 BRPM | DIASTOLIC BLOOD PRESSURE: 74 MMHG | SYSTOLIC BLOOD PRESSURE: 134 MMHG | TEMPERATURE: 97 F

## 2022-08-02 RX ADMIN — HEPARIN SODIUM 5000 UNIT(S): 5000 INJECTION INTRAVENOUS; SUBCUTANEOUS at 05:29

## 2022-08-02 RX ADMIN — LOSARTAN POTASSIUM 50 MILLIGRAM(S): 100 TABLET, FILM COATED ORAL at 05:29

## 2022-08-02 RX ADMIN — Medication 650 MILLIGRAM(S): at 05:29

## 2022-08-02 NOTE — DISCHARGE NOTE NURSING/CASE MANAGEMENT/SOCIAL WORK - NSDCPEFALRISK_GEN_ALL_CORE
For information on Fall & Injury Prevention, visit: [email or web address] OR  [email or web address] OR  https://www.cdc.gov/steadi/patient.html

## 2022-08-02 NOTE — PROGRESS NOTE ADULT - SUBJECTIVE AND OBJECTIVE BOX
Patient is a 89y old  Female who presents with a chief complaint of rehabilitation of multitrauma after fall with resulting displaced C1 vertebrae, c2 odontoid fracture, and scalp hematoma with significant decline in function (01 Aug 2022 17:46)      HPI:  This is a 89yo PMH of HTN, aortic aneurysm who presented as a trauma after a fall. Patient endorsed she was taking her blood pressure medication and then fell forward and hit her head on the counter. No LOC, and no AC. She did not fall to the ground, and was able to ambulate. She had associated head and neck pain that was non-radiating. Patient sought medical attention at [facility] for a forehead laceration that was actively bleeding which she sustained after the trauma. Imaging was done revealed multiple traumas including multiple acute displaced fractures of C1 vertebrae with mild compression of right vertebral artery, and C2 type II odontoid fracture with angulation of superior fracture fragment, supraorbital scalp hematoma. Patient was transferred to [facility] and admitted to SICU.  Neurosurgery was consulted, and no surgical intervention was recommended; just to wear cervical [place] J collar at all times. Of note also patient had a fall previous to this in Feb 2022, after which went for rehab and then required a cane and walker for ambulation but still was independent with ADLs; prior to that fall she was independent. Patient was evaluated by physical therapy and occupational and was found to be CG RW 30',  transfer min assist, bed mobility mod assist, bathing max assist, upper body and lower body dressing mod assist, toilet hygiene mod assist, and grooming min assist. Patient was evaluated by physiatry and was found to be a good candidate for acute rehab given her multitrauma with significant injuries can benefit from a physiatrists supervision, and with significant decline in function from baseline can benefit from multidisciplinary therapy 3hours per day for a minimum of 5 days of week.  Patient denied chest pain, shortness of breath, dizziness, paresthesias, new weakness, memory changes, and changes in urinary and bowel habits.        TODAY'S SUBJECTIVE & REVIEW OF SYMPTOMS  Patient was seen this AM. No active complaints, or acute overnight events. Scheduled for discharge today.      Constiutional WNL         Cardio WNL               Resp WNL   GI WNL                              WNL                     Heme WNL   Endo WNL                        Skin WNL                   MSK WNL   Neuro WNL                      Cognitive WNL          Psych WNL      PHYSICAL EXAM    Vital Signs Last 24 Hrs  T(C): 36.7 (02 Aug 2022 05:49), Max: 37.3 (01 Aug 2022 20:09)  T(F): 98 (02 Aug 2022 05:49), Max: 99.1 (01 Aug 2022 20:09)  HR: 62 (02 Aug 2022 05:49) (62 - 102)  BP: 145/67 (02 Aug 2022 05:49) (96/58 - 145/67)  BP(mean): --  RR: 18 (02 Aug 2022 05:49) (16 - 18)  SpO2: --    General: [ x ] NAD, Resting Comfortable,   [  x ] other: in hard cervical collar                        HEENT: [   ] NC/AT, EOMI, PERRL , Normal Conjunctivae,   [x ] other:  left forehead with large hematoma and ecchymosis - healing; laceration above left eye covered by steristrip. b/l raccoon eyes - Improving   Cardio: [x  ] RRR, no murmur,   [   ] other:                              Pulm: [ x  ] No Respiratory Distress,  Lungs CTAB,   [   ] other:                       Abdomen: [ x ] ND/NT, Soft,   [   ] other:    : [  x ] NO DYKES CATHETER, [   ] DYKES CATHETER- no meatal tear, no discharge, [   ] other:                                            MSK: [ x  ] No joint swelling, Full ROM,   [  x ] other:  ulnar deviation of fingers b/l                                       Ext: [   x]No C/C/E, No calf tenderness,   [   ]other:    Skin: [  x ]intact,   [ x  ] other:  1/2 inch diameter mole on right hand                                                                   Neurological Examination:  Cognitive: [ x   ] AAO x 3,   [    ]  other:                                                                      Attention:  [ x  ] intact,   [    ]  other: 3/3 word recall                             Memory: [  x  ] intact,    [    ]  other:     Mood/Affect: [  x  ] wnl,    [    ]  other:                                                                             Communication: [  x  ]Fluent, no dysarthria, following commands:  [    ] other:   CN II - XII:  [   x ] intact,  [    ] other:                                                                                        Motor:   RIGHT UE: [   ] WNL,  [   ] other: 4/5   LEFT    UE: [   ] WNL,  [   ] other: 4/5  RIGHT LE: [   ] WNL,  [   ] other: 4/5  LEFT    LE: [   ] WNL,  [   ] other: 4/5  2+/5 b/l shoulders   Tone: [   x ] wnl,   [    ]  other:  DTRs: [x   ]symmetric, [   ] other:  Coordination:   [ x   ] intact,   [    ] other:                                                               Sensory: [ x   ] Intact to light touch,   [    ] other:      MEDICATIONS  (STANDING):  acetaminophen     Tablet .. 650 milliGRAM(s) Oral every 12 hours  heparin   Injectable 5000 Unit(s) SubCutaneous every 8 hours  losartan 50 milliGRAM(s) Oral daily    MEDICATIONS  (PRN):  acetaminophen     Tablet .. 650 milliGRAM(s) Oral every 6 hours PRN Severe Pain (7 - 10)  melatonin 3 milliGRAM(s) Oral at bedtime PRN Insomnia  senna 2 Tablet(s) Oral at bedtime PRN Constipation      RECENT LABS/IMAGING                        13.2   12.09 )-----------( 387      ( 01 Aug 2022 08:12 )             39.9     08-01    141  |  100  |  15  ----------------------------<  161<H>  4.4   |  29  |  0.6<L>    Ca    9.1      01 Aug 2022 08:12  Mg     1.9     08-01    TPro  6.1  /  Alb  3.7  /  TBili  0.3  /  DBili  x   /  AST  27  /  ALT  39  /  AlkPhos  101  08-01     Patient is a 89y old  Female who presents with a chief complaint of rehabilitation of multitrauma after fall with resulting displaced C1 vertebrae, c2 odontoid fracture, and scalp hematoma with significant decline in function (01 Aug 2022 17:46)      HPI:  This is a 89yo PMH of HTN, aortic aneurysm who presented as a trauma after a fall. Patient endorsed she was taking her blood pressure medication and then fell forward and hit her head on the counter. No LOC, and no AC. She did not fall to the ground, and was able to ambulate. She had associated head and neck pain that was non-radiating. Patient sought medical attention at [facility] for a forehead laceration that was actively bleeding which she sustained after the trauma. Imaging was done revealed multiple traumas including multiple acute displaced fractures of C1 vertebrae with mild compression of right vertebral artery, and C2 type II odontoid fracture with angulation of superior fracture fragment, supraorbital scalp hematoma. Patient was transferred to [facility] and admitted to SICU.  Neurosurgery was consulted, and no surgical intervention was recommended; just to wear cervical [place] J collar at all times. Of note also patient had a fall previous to this in Feb 2022, after which went for rehab and then required a cane and walker for ambulation but still was independent with ADLs; prior to that fall she was independent. Patient was evaluated by physical therapy and occupational and was found to be CG RW 30',  transfer min assist, bed mobility mod assist, bathing max assist, upper body and lower body dressing mod assist, toilet hygiene mod assist, and grooming min assist. Patient was evaluated by physiatry and was found to be a good candidate for acute rehab given her multitrauma with significant injuries can benefit from a physiatrists supervision, and with significant decline in function from baseline can benefit from multidisciplinary therapy 3hours per day for a minimum of 5 days of week.  Patient denied chest pain, shortness of breath, dizziness, paresthesias, new weakness, memory changes, and changes in urinary and bowel habits.        TODAY'S SUBJECTIVE & REVIEW OF SYMPTOMS  Patient was seen this AM. No active complaints, or acute overnight events. Scheduled for discharge today.      Constiutional WNL         Cardio WNL               Resp WNL   GI WNL                              WNL                     Heme WNL   Endo WNL                        Skin WNL                   MSK WNL - in hard collar    Neuro WNL                      Cognitive WNL          Psych WNL      PHYSICAL EXAM    Vital Signs Last 24 Hrs  T(C): 36.7 (02 Aug 2022 05:49), Max: 37.3 (01 Aug 2022 20:09)  T(F): 98 (02 Aug 2022 05:49), Max: 99.1 (01 Aug 2022 20:09)  HR: 62 (02 Aug 2022 05:49) (62 - 102)  BP: 145/67 (02 Aug 2022 05:49) (96/58 - 145/67)  BP(mean): --  RR: 18 (02 Aug 2022 05:49) (16 - 18)  SpO2: --    General: [ x ] NAD, Resting Comfortable,   [  x ] other: in hard cervical collar                        HEENT: [   ] NC/AT, EOMI, PERRL , Normal Conjunctivae,   [x ] other:  left forehead with large hematoma and ecchymosis - healing; laceration above left eye covered by steristrip. b/l raccoon eyes - Improving   Cardio: [x  ] RRR, no murmur,   [   ] other:                              Pulm: [ x  ] No Respiratory Distress,  Lungs CTAB,   [   ] other:                       Abdomen: [ x ] ND/NT, Soft,   [   ] other:    : [  x ] NO DYKES CATHETER, [   ] DYKES CATHETER- no meatal tear, no discharge, [   ] other:                                            MSK: [ x  ] No joint swelling, Full ROM,   [  x ] other:  ulnar deviation of fingers b/l                                       Ext: [   x]No C/C/E, No calf tenderness,   [   ]other:    Skin: [  x ]intact,   [ x  ] other:  1/2 inch diameter mole on right hand                                                                   Neurological Examination:  Cognitive: [ x   ] AAO x 3,   [    ]  other:                                                                      Attention:  [ x  ] intact,   [    ]  other: 3/3 word recall                             Memory: [  x  ] intact,    [    ]  other:     Mood/Affect: [  x  ] wnl,    [    ]  other:                                                                             Communication: [  x  ]Fluent, no dysarthria, following commands:  [    ] other:   CN II - XII:  [   x ] intact,  [    ] other:                                                                                        Motor:   RIGHT UE: [   ] WNL,  [   ] other: 4/5   LEFT    UE: [   ] WNL,  [   ] other: 4/5  RIGHT LE: [   ] WNL,  [   ] other: 4/5  LEFT    LE: [   ] WNL,  [   ] other: 4/5  2+/5 b/l shoulders   Tone: [   x ] wnl,   [    ]  other:  DTRs: [x   ]symmetric, [   ] other:  Coordination:   [ x   ] intact,   [    ] other:                                                               Sensory: [ x   ] Intact to light touch,   [    ] other:      MEDICATIONS  (STANDING):  acetaminophen     Tablet .. 650 milliGRAM(s) Oral every 12 hours  heparin   Injectable 5000 Unit(s) SubCutaneous every 8 hours  losartan 50 milliGRAM(s) Oral daily    MEDICATIONS  (PRN):  acetaminophen     Tablet .. 650 milliGRAM(s) Oral every 6 hours PRN Severe Pain (7 - 10)  melatonin 3 milliGRAM(s) Oral at bedtime PRN Insomnia  senna 2 Tablet(s) Oral at bedtime PRN Constipation      RECENT LABS/IMAGING                        13.2   12.09 )-----------( 387      ( 01 Aug 2022 08:12 )             39.9     08-01    141  |  100  |  15  ----------------------------<  161<H>  4.4   |  29  |  0.6<L>    Ca    9.1      01 Aug 2022 08:12  Mg     1.9     08-01    TPro  6.1  /  Alb  3.7  /  TBili  0.3  /  DBili  x   /  AST  27  /  ALT  39  /  AlkPhos  101  08-01

## 2022-08-02 NOTE — PROGRESS NOTE ADULT - ATTENDING COMMENTS
I reviewed the chart and examined the patient with the resident and we discussed the findings and treatment plan.  The patient is tolerating the rehab program well. I agree with the findings and treatment plan above, which I modified as indicated. The patient requires 3 hrs a day of acute inpatient rehab. Doing well. VSS. Participating in therapies. Transfers and ambulates with CG. Continue rehab program.    I read, edited and agree with the Assessment:  #C1/ C2 vertebra fracture, type II dens fracture   - Pain: Tylenol PRN  - [place] J collar in place all times  - Neurosurgery consult: no neurosurgical intervention, f/u w/ Dr. Pulido in 2 weeks outpatient  - CTA: The vertebral arteries are patent, without evidence of   vascular occlusion, extravasation, or dissection. mild compression upon the right vertebral artery by the inferior corner of the lateral mass of C1, which is mildly displaced laterally  - continue to monitor neuro status closely   - c/w pt and ot; participating well     #Osteoarthritis of knees  - likely cause of fall  - ther-ex, prn Tylenol    #Head Trauma  - forehead hematoma/ laceration/ ecchymosis  - no intracranial trauma, LOC or evidence of concussion syndrome  - monitor    #Hypertension   - c/w home losartan 50mg daily   - inpt bp goal <140/90    #Osteoporosis  - on home aldredonate   - not available in hospital    #Leukocytosis   - likely reactive 2/2 to trauma  - downtrending; c/w weekly cbc      -Pain control: tylenol PRN
I reviewed the chart and examined the patient with the resident and we discussed the findings and treatment plan.  The patient is tolerating the rehab program well. I agree with the findings and treatment plan above, which I modified as indicated. The patient requires 3 hrs a day of acute inpatient rehab. Patient with no new c/o. Alert and participating well in therapies. Able to stand and ambulate with RW and CG. Continue acute rehab. VSS.     I read, edited and agree with the Assessment:  Rehab of multitrauma after fall with resulting displaced C1 vertebrae, c2 odontoid fracture, and scalp hematoma/ laceration with significant decline in function    #C1/ C2 vertebra fracture, type II dens fracture   - Pain: Tylenol PRN  - [place] J collar in place all times  - Neurosurgery consult: no neurosurgical intervention, f/u w/ Dr. Pulido in 2 weeks outpatient  - CTA: The vertebral arteries are patent, without evidence of vascular occlusion, extravasation, or dissection. mild compression upon the right vertebral artery by the inferior corner of the lateral mass of C1, which is mildly displaced laterally  - continue to monitor neuro status closely   - c/w pt and ot; participating well     #Osteoarthritis of knees  - likely cause of fall  - ther-ex, prn Tylenol    #Head Trauma  - forehead hematoma/ laceration/ ecchymosis  - no intracranial trauma, LOC or evidence of concussion syndrome  - monitor    #Hypertension   - c/w home losartan 50mg daily   - inpt bp goal <140/90    #Osteoporosis  - on home aldredonate   - not available in hospital    #Leukocytosis   - likely reactive 2/2 to trauma  - downtrending; c/w weekly cbc
I reviewed the chart and examined the patient with the resident and we discussed the findings and treatment plan.  The patient is tolerating the rehab program well. I agree with the findings and treatment plan above, which I modified as indicated. VSS. Labs stable. No c/o pain. Ambulates with RW with CG. Alert and eating well. For d/c to [facility] today. Must wear hard cervical collar at all times and f/u with NSGY in 2-3 weeks. F/U with medical doctor at [facility]. DASH diet. Medically stable. Activities as tolerated with assistance and hard collar at all times.     I read, edited and agree with the Assessment.
I reviewed the chart and examined the patient with the resident and we discussed the findings and treatment plan.  The patient is tolerating the rehab program well. I agree with the findings and treatment plan above, which I modified as indicated. The patient requires 3 hrs a day of acute inpatient rehab. Doing well. Forehead hematoma healing. Exam stable. Participating well with therapies. VSS. Recheck labs in am Ambulating with CG. Continue rehab program.     I read, edited and agree with the Assessment:  #C1/ C2 vertebra fracture, type II dens fracture   - Pain: Tylenol PRN  - [place] J collar in place all times  - Neurosurgery consult: no neurosurgical intervention, f/u w/ Dr. Pulido in 2 weeks outpatient  - CTA: The vertebral arteries are patent, without evidence of vascular occlusion, extravasation, or dissection. mild compression upon the right vertebral artery by the inferior corner of the lateral mass of C1, which is mildly displaced laterally  - continue to monitor neuro status closely   - c/w pt and ot; participating well     #Osteoarthritis of knees  - likely cause of fall  - ther-ex, prn Tylenol    #Head Trauma  - forehead hematoma/ laceration/ ecchymosis  - no intracranial trauma, LOC or evidence of concussion syndrome  - monitor    #Hypertension   - c/w home losartan 50mg daily   - inpt bp goal <140/90    #Osteoporosis  - on home aldredonate   - not available in hospital    #Leukocytosis   - likely reactive 2/2 to trauma  - downtrending; c/w weekly cbc in am     -Pain control: tylenol PRN     -Skin: monitor laceration/ contusion    -FEN: labs stable.     - Diet: DASH
I reviewed the chart and examined the patient with the resident and we discussed the findings and treatment plan.  The patient is tolerating the rehab program well. I agree with the findings and treatment plan above, which I modified as indicated. The patient requires 3 hrs a day of acute inpatient rehab. No new complaints. Some neck discomfort. Takes Tylenol. Compliant with hard collar. VSS. Participating well in therapies. Stands with min assistance and ambulates with CG. Continue rehab program.    I read, edited and agree with the Assessment:  #C1/ C2 vertebra fracture, type II dens fracture   - Pain: Tylenol PRN  - [place] J collar in place  - Neurosurgery consult: no neurosurgical intervention, f/u w/ Dr. Pulido in 2 weeks outpatient  - CTA: The vertebral arteries are patent, without evidence of   vascular occlusion, extravasation, or dissection. mild compression upon the right vertebral artery by the inferior corner of the lateral mass of C1, which is mildly displaced laterally  - continue to monitor neuro status closely   - c/w pt and ot; participating well     #Osteoarthritis of knees  - likely cause of fall  - ther-ex, prn Tylenol    #Head Trauma-  - forhead hematoma/ laceration/ echymosis  - no intracranial trauma, LOC or evidence of concussion syndrome  - monitor    #Hypertension   - c/w home losartan 50mg daily   - inpt bp goal <140/90    #Osteoporosis  - on home aldredonate   - not available in hospital    #Leukocytosis   - likely reactive 2/2 to trauma  - downtrending; c/w weekly cbc      -Pain control: tylenol PRN
I reviewed the chart and examined the patient with the resident and we discussed the findings and treatment plan.  The patient is tolerating the rehab program well. I agree with the findings and treatment plan above, which I modified as indicated. The patient requires 3 hrs a day of acute inpatient rehab. No new complaints. VSS. PE stable. Ambulates with RW and min assistance. Continue rehab program.    I read, edited and agree with the Assessment:  #C1/ C2 vertebra fracture, type II dens fracture   - Pain: Tylenol PRN  - [place] J collar in place all times  - Neurosurgery consult: no neurosurgical intervention, f/u w/ Dr. Pulido in 2 weeks outpatient  - CTA: The vertebral arteries are patent, without evidence of vascular occlusion, extravasation, or dissection. mild compression upon the right vertebral artery by the inferior corner of the lateral mass of C1, which is mildly displaced laterally  - continue to monitor neuro status closely   - c/w pt and ot; participating well     #Osteoarthritis of knees  - likely cause of fall  - ther-ex, prn Tylenol    #Head Trauma  - forehead hematoma/ laceration/ ecchymosis  - no intracranial trauma, LOC or evidence of concussion syndrome  - monitor    #Hypertension   - c/w home losartan 50mg daily   - inpt bp goal <140/90    #Osteoporosis  - on home aldredonate   - not available in hospital    #Leukocytosis   - likely reactive 2/2 to trauma  - downtrending; c/w weekly cbc      -Pain control: tylenol PRN     -Skin: monitor laceration/ contusion    -FEN: labs stable.     - Diet: DASH
I reviewed the chart and examined the patient with the resident and we discussed the findings and treatment plan.  The patient is tolerating the rehab program well. I agree with the findings and treatment plan above, which I modified as indicated. The patient requires 3 hrs a day of acute inpatient rehab. Doing well. Motivated. Transfers with min assistance and ambulates with RW with 100 feet with mod assist for balance and requires mod assistance for most ADL. She is medically stable/ VSS. She is a good acute rehab candidate. She has a mild Leukocytosis with no sign of infection at this time.     I read, edited and agree with the Assessment:  Rehab of multitrauma after fall with resulting displaced C1 vertebrae, c2 odontoid fracture, and scalp hematoma/ laceration with significant decline in function     #C1/ C2 vertebra fracture, type II dens fracture   - Pain: Tylenol PRN  - [place] J collar in place  - Neurosurgery consult: no neurosurgical intervention, f/u w/ Dr. Pulido in 2 weeks outpatient  - CTA: The vertebral arteries are patent, without evidence of   vascular occlusion, extravasation, or dissection. mild compression upon the right vertebral artery by the inferior corner of the lateral mass of C1, which is mildly displaced laterally  - continue to monitor neuro status closely   - c/w pt and ot; participating well     #Head Trauma-  - forhead hematoma/ laceration/ echymosis  - no intracranial trauma, LOC or evidence of concussion syndrome  - monitor    #Hypertension   - c/w home losartan 50mg daily   - inpt bp goal <140/90    #Osteoporosis  - on home aldredonate   - not available in hospital    #Leukocytosis   - likely reactive 2/2 to trauma  - downtrending; c/w weekly cbc      -Pain control: tylenol PRN     -Skin: monitor laceration/ contusion    -FEN: labs stable.     - Diet: DASH

## 2022-08-02 NOTE — DISCHARGE NOTE NURSING/CASE MANAGEMENT/SOCIAL WORK - NSDCVIVACCINE_GEN_ALL_CORE_FT
Tdap; 15-Feb-2022 11:47; Sansever[place], Jennifer (RN); [employer]; [id number] (Exp. Date: 28-Sep-2023); IntraMuscular; Deltoid Right.; 0.5 milliLiter(s); VIS (VIS Published: 09-May-2013, VIS Presented: 15-Feb-2022);

## 2022-08-02 NOTE — DISCHARGE NOTE NURSING/CASE MANAGEMENT/SOCIAL WORK - PATIENT PORTAL LINK FT
You can access the FollowMyHealth Patient Portal offered by [facility] by registering at the following website: [email or web address]. By joining [employer]’s FollowMyHealth portal, you will also be able to view your health information using other applications (apps) compatible with our system.

## 2022-08-02 NOTE — PROGRESS NOTE ADULT - ASSESSMENT
Rehab of multitrauma after fall with resulting displaced C1 vertebrae, c2 odontoid fracture, and scalp hematoma/ laceration with significant decline in function    #C1/ C2 vertebra fracture, type II dens fracture   - Pain: Tylenol PRN  - [place] J collar in place all times  - Neurosurgery consult: no neurosurgical intervention, f/u w/ Dr. Pulido in 2 weeks outpatient  - CTA: The vertebral arteries are patent, without evidence of vascular occlusion, extravasation, or dissection. mild compression upon the right vertebral artery by the inferior corner of the lateral mass of C1, which is mildly displaced laterally  - continue to monitor neuro status closely   - c/w pt and ot; participating well   - scheduled for discharge today, follow up appointments made.     #Osteoarthritis of knees  - likely cause of fall  - ther-ex, prn Tylenol    #Head Trauma  - forehead hematoma/ laceration/ ecchymosis  - no intracranial trauma, LOC or evidence of concussion syndrome  - monitor    #Hypertension   - c/w home losartan 50mg daily   - stable    #Osteoporosis  - on home aldredonate   - not available in hospital    #Leukocytosis   - likely reactive 2/2 to trauma  - downtrending; c/w weekly cbc      -Pain control: tylenol PRN     -Skin: monitor laceration/ contusion    -FEN: labs pending    - Diet: DASH   Rehab of multitrauma after fall with resulting displaced C1 vertebrae, c2 odontoid fracture, and scalp hematoma/ laceration with significant decline in function    #C1/ C2 vertebra fracture, type II dens fracture   - Pain: Tylenol PRN  - [place] J collar in place all times  - Neurosurgery consult: no neurosurgical intervention, f/u w/ Dr. Pulido in 2 weeks outpatient  - CTA: The vertebral arteries are patent, without evidence of vascular occlusion, extravasation, or dissection. mild compression upon the right vertebral artery by the inferior corner of the lateral mass of C1, which is mildly displaced laterally  - continue to monitor neuro status closely   - c/w pt and ot; participating well   - scheduled for discharge today, follow up appointments made.     #Osteoarthritis of knees  - likely cause of fall  - ther-ex, prn Tylenol    #Head Trauma  - forehead hematoma/ laceration/ ecchymosis  - no intracranial trauma, LOC or evidence of concussion syndrome  - monitor    #Hypertension   - c/w home losartan 50mg daily   - stable    #Osteoporosis  - on home aldredonate   - not available in hospital    #Leukocytosis   - downtrending; No sign of infection. Can f/u as outpatient     -Pain control: tylenol PRN     -Skin: monitor laceration/ contusion    - Diet: DASH

## 2022-08-02 NOTE — PROGRESS NOTE ADULT - REASON FOR ADMISSION
rehabilitation of multitrauma after fall with resulting displaced C1 vertebrae, c2 odontoid fracture, and scalp hematoma with significant decline in function
Rehabilitation for multitrauma after fall with resulting displaced C1 vertebrae, c2 odontoid fracture, and scalp hematoma with significant decline in function
rehabilitation of multitrauma after fall with resulting displaced C1 vertebrae, c2 odontoid fracture, and scalp hematoma with significant decline in function

## 2022-08-04 PROBLEM — I71.9 AORTIC ANEURYSM OF UNSPECIFIED SITE, WITHOUT RUPTURE: Chronic | Status: ACTIVE | Noted: 2020-11-14

## 2022-08-11 DIAGNOSIS — S12.120D: ICD-10-CM

## 2022-08-11 DIAGNOSIS — Z88.0 ALLERGY STATUS TO PENICILLIN: ICD-10-CM

## 2022-08-11 DIAGNOSIS — S00.03XD CONTUSION OF SCALP, SUBSEQUENT ENCOUNTER: ICD-10-CM

## 2022-08-11 DIAGNOSIS — M81.0 AGE-RELATED OSTEOPOROSIS WITHOUT CURRENT PATHOLOGICAL FRACTURE: ICD-10-CM
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
## 2022-08-11 DIAGNOSIS — D72.828 OTHER ELEVATED WHITE BLOOD CELL COUNT: ICD-10-CM

## 2022-08-11 DIAGNOSIS — M17.0 BILATERAL PRIMARY OSTEOARTHRITIS OF KNEE: ICD-10-CM

## 2022-08-11 DIAGNOSIS — Z88.2 ALLERGY STATUS TO SULFONAMIDES: ICD-10-CM

## 2022-08-11 DIAGNOSIS — S12.000D UNSPECIFIED DISPLACED FRACTURE OF FIRST CERVICAL VERTEBRA, SUBSEQUENT ENCOUNTER FOR FRACTURE WITH ROUTINE HEALING: ICD-10-CM

## 2022-08-11 DIAGNOSIS — I71.2 THORACIC AORTIC ANEURYSM, WITHOUT RUPTURE: ICD-10-CM

## 2022-08-11 DIAGNOSIS — S12.112D NONDISPLACED TYPE II DENS FRACTURE, SUBSEQUENT ENCOUNTER FOR FRACTURE WITH ROUTINE HEALING: ICD-10-CM

## 2022-08-11 DIAGNOSIS — J47.9 BRONCHIECTASIS, UNCOMPLICATED: ICD-10-CM

## 2022-08-11 DIAGNOSIS — I10 ESSENTIAL (PRIMARY) HYPERTENSION: ICD-10-CM

## 2022-08-11 DIAGNOSIS — S01.81XD LACERATION WITHOUT FOREIGN BODY OF OTHER PART OF HEAD, SUBSEQUENT ENCOUNTER: ICD-10-CM

## 2022-08-11 DIAGNOSIS — I77.1 STRICTURE OF ARTERY: ICD-10-CM

## 2022-08-11 DIAGNOSIS — Z91.81 HISTORY OF FALLING: ICD-10-CM

## 2022-08-12 ENCOUNTER — APPOINTMENT (OUTPATIENT)
Age: 87
End: 2022-08-12

## 2022-08-12 VITALS
WEIGHT: 102 LBS | SYSTOLIC BLOOD PRESSURE: 116 MMHG | HEIGHT: 62 IN | TEMPERATURE: 97 F | BODY MASS INDEX: 18.77 KG/M2 | OXYGEN SATURATION: 96 % | HEART RATE: 82 BPM | DIASTOLIC BLOOD PRESSURE: 71 MMHG

## 2022-08-12 DIAGNOSIS — S12.030S: ICD-10-CM

## 2022-08-12 PROCEDURE — 99212 OFFICE O/P EST SF 10 MIN: CPT

## 2022-08-12 NOTE — ASSESSMENT
[FreeTextEntry1] : This is a 89yo PMH of HTN, aortic aneurysm who presented as a trauma after a fall; found to have multiple acute displaced fractures of C1 vertebrae with mild compression of right vertebral artery, and C2 type II odontoid fracture with angulation of superior fracture fragment, supraorbital scalp hematoma.

## 2022-08-12 NOTE — PHYSICAL EXAM
[Calm] : calm [JVD] : no jugular venous distention  [de-identified] : No acute distresss  [de-identified] : NC, healed left forehead laceration with sutures in place  [de-identified] : No c-spine tenderness  [de-identified] : normal respiratory effort  [de-identified] : S1, S2 [de-identified] : soft, nondistended, nontender

## 2022-08-12 NOTE — HISTORY OF PRESENT ILLNESS
[de-identified] : `This is a 89yo PMH of HTN, aortic aneurysm who presented as a trauma after a fall. Patient endorsed she was taking her blood pressure medication and then fell forward and hit her head on the counter. No LOC, and no AC. She did not fall to the ground, and was able to ambulate. She had associated head and neck pain that was non-radiating. Patient sought medical attention at [facility] for a forehead laceration that was actively bleeding which she sustained after the trauma. Imaging was done revealed multiple traumas including multiple acute displaced fractures of C1 vertebrae with mild compression of right vertebral artery, and C2 type II odontoid fracture with angulation of superior fracture \par fragment, supraorbital scalp hematoma. Patient was transferred to [facility] and admitted to SICU.  Neurosurgery was consulted, and no surgical intervention was \par recommended; just to wear cervical [place] J collar at all times. Of note also patient had a fall previous to this in Feb 2022, after which went for rehab and \par then required a cane and walker for ambulation but still was independent with ADLs; prior to that fall she was independent. Patient was evaluated by physical \par therapy and occupational and was found to be CG RW 30',  transfer min assist, bed mobility mod assist, bathing max assist, upper body and lower body dressing \par mod assist, toilet hygiene mod assist, and grooming min assist. Patient was evaluated by physiatry and was found to be a good candidate for acute rehab \par given her multitrauma with significant injuries can benefit from a physiatrists supervision, and with significant decline in function from baseline can benefit \par from multidisciplinary therapy 3hours per day for a minimum of 5 days of week. Patient denied chest pain, shortness of breath, dizziness, paresthesias, new \par weakness, memory changes, and changes in urinary and bowel habits.  [de-identified] : Patient discharged to [facility]. Patient reports doing well with rehab and able to ambulate with a walker. Neck pain has improved since\par  being discharged. Patient reports wearing hard collar at all times. \par

## 2022-08-12 NOTE — PLAN
[FreeTextEntry1] : Left forehead sutures removed \par Patient to follow up with neurosurgery for follow up appointment \par Patient to follow up with trauma clinic as needed

## 2022-08-17 ENCOUNTER — APPOINTMENT (OUTPATIENT)
Dept: NEUROSURGERY | Facility: CLINIC | Age: 87
End: 2022-08-17

## 2022-08-20 ENCOUNTER — INPATIENT (INPATIENT)
Facility: HOSPITAL | Age: 87
LOS: 5 days | Discharge: NURSING, REHAB OR LONG-TERM CARE | End: 2022-08-26
Attending: INTERNAL MEDICINE | Admitting: INTERNAL MEDICINE

## 2022-08-20 VITALS
DIASTOLIC BLOOD PRESSURE: 76 MMHG | HEART RATE: 101 BPM | WEIGHT: 102.96 LBS | OXYGEN SATURATION: 96 % | HEIGHT: 62 IN | SYSTOLIC BLOOD PRESSURE: 133 MMHG | TEMPERATURE: 96 F | RESPIRATION RATE: 19 BRPM

## 2022-08-20 DIAGNOSIS — I71.2 THORACIC AORTIC ANEURYSM, WITHOUT RUPTURE: ICD-10-CM
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
## 2022-08-20 LAB
ALBUMIN SERPL ELPH-MCNC: 3.6 G/DL — SIGNIFICANT CHANGE UP (ref 3.5–5.2)
ALP SERPL-CCNC: 85 U/L — SIGNIFICANT CHANGE UP (ref 30–115)
ALT FLD-CCNC: 12 U/L — SIGNIFICANT CHANGE UP (ref 0–41)
ANION GAP SERPL CALC-SCNC: 9 MMOL/L — SIGNIFICANT CHANGE UP (ref 7–14)
AST SERPL-CCNC: 12 U/L — SIGNIFICANT CHANGE UP (ref 0–41)
BASOPHILS # BLD AUTO: 0.02 K/UL — SIGNIFICANT CHANGE UP (ref 0–0.2)
BASOPHILS NFR BLD AUTO: 0.2 % — SIGNIFICANT CHANGE UP (ref 0–1)
BILIRUB SERPL-MCNC: <0.2 MG/DL — SIGNIFICANT CHANGE UP (ref 0.2–1.2)
BUN SERPL-MCNC: 25 MG/DL — HIGH (ref 10–20)
CALCIUM SERPL-MCNC: 9.3 MG/DL — SIGNIFICANT CHANGE UP (ref 8.5–10.1)
CHLORIDE SERPL-SCNC: 98 MMOL/L — SIGNIFICANT CHANGE UP (ref 98–110)
CO2 SERPL-SCNC: 30 MMOL/L — SIGNIFICANT CHANGE UP (ref 17–32)
CREAT SERPL-MCNC: 0.9 MG/DL — SIGNIFICANT CHANGE UP (ref 0.7–1.5)
EGFR: 60 ML/MIN/1.73M2 — SIGNIFICANT CHANGE UP
EOSINOPHIL # BLD AUTO: 0.02 K/UL — SIGNIFICANT CHANGE UP (ref 0–0.7)
EOSINOPHIL NFR BLD AUTO: 0.2 % — SIGNIFICANT CHANGE UP (ref 0–8)
GLUCOSE SERPL-MCNC: 106 MG/DL — HIGH (ref 70–99)
HCT VFR BLD CALC: 41 % — SIGNIFICANT CHANGE UP (ref 37–47)
HGB BLD-MCNC: 13.4 G/DL — SIGNIFICANT CHANGE UP (ref 12–16)
IMM GRANULOCYTES NFR BLD AUTO: 0.2 % — SIGNIFICANT CHANGE UP (ref 0.1–0.3)
LYMPHOCYTES # BLD AUTO: 1.53 K/UL — SIGNIFICANT CHANGE UP (ref 1.2–3.4)
LYMPHOCYTES # BLD AUTO: 18.1 % — LOW (ref 20.5–51.1)
MAGNESIUM SERPL-MCNC: 2 MG/DL — SIGNIFICANT CHANGE UP (ref 1.8–2.4)
MCHC RBC-ENTMCNC: 29.8 PG — SIGNIFICANT CHANGE UP (ref 27–31)
MCHC RBC-ENTMCNC: 32.7 G/DL — SIGNIFICANT CHANGE UP (ref 32–37)
MCV RBC AUTO: 91.1 FL — SIGNIFICANT CHANGE UP (ref 81–99)
MONOCYTES # BLD AUTO: 1.31 K/UL — HIGH (ref 0.1–0.6)
MONOCYTES NFR BLD AUTO: 15.5 % — HIGH (ref 1.7–9.3)
NEUTROPHILS # BLD AUTO: 5.56 K/UL — SIGNIFICANT CHANGE UP (ref 1.4–6.5)
NEUTROPHILS NFR BLD AUTO: 65.8 % — SIGNIFICANT CHANGE UP (ref 42.2–75.2)
NRBC # BLD: 0 /100 WBCS — SIGNIFICANT CHANGE UP (ref 0–0)
PLATELET # BLD AUTO: 302 K/UL — SIGNIFICANT CHANGE UP (ref 130–400)
POTASSIUM SERPL-MCNC: 4.9 MMOL/L — SIGNIFICANT CHANGE UP (ref 3.5–5)
POTASSIUM SERPL-SCNC: 4.9 MMOL/L — SIGNIFICANT CHANGE UP (ref 3.5–5)
PROT SERPL-MCNC: 5.9 G/DL — LOW (ref 6–8)
RBC # BLD: 4.5 M/UL — SIGNIFICANT CHANGE UP (ref 4.2–5.4)
RBC # FLD: 14.3 % — SIGNIFICANT CHANGE UP (ref 11.5–14.5)
SARS-COV-2 RNA SPEC QL NAA+PROBE: DETECTED
SODIUM SERPL-SCNC: 137 MMOL/L — SIGNIFICANT CHANGE UP (ref 135–146)
TROPONIN T SERPL-MCNC: <0.01 NG/ML — SIGNIFICANT CHANGE UP
WBC # BLD: 8.46 K/UL — SIGNIFICANT CHANGE UP (ref 4.8–10.8)
WBC # FLD AUTO: 8.46 K/UL — SIGNIFICANT CHANGE UP (ref 4.8–10.8)

## 2022-08-20 PROCEDURE — 70450 CT HEAD/BRAIN W/O DYE: CPT | Mod: 26,MA

## 2022-08-20 PROCEDURE — 99285 EMERGENCY DEPT VISIT HI MDM: CPT

## 2022-08-20 PROCEDURE — 71045 X-RAY EXAM CHEST 1 VIEW: CPT | Mod: 26

## 2022-08-20 PROCEDURE — 72125 CT NECK SPINE W/O DYE: CPT | Mod: 26,MA

## 2022-08-20 PROCEDURE — 93010 ELECTROCARDIOGRAM REPORT: CPT

## 2022-08-20 PROCEDURE — 72170 X-RAY EXAM OF PELVIS: CPT | Mod: 26

## 2022-08-20 PROCEDURE — 99222 1ST HOSP IP/OBS MODERATE 55: CPT

## 2022-08-20 RX ORDER — LANOLIN ALCOHOL/MO/W.PET/CERES
5 CREAM (GRAM) TOPICAL AT BEDTIME
Refills: 0 | Status: DISCONTINUED | OUTPATIENT
Start: 2022-08-20 | End: 2022-08-26

## 2022-08-20 RX ORDER — LOSARTAN POTASSIUM 100 MG/1
50 TABLET, FILM COATED ORAL DAILY
Refills: 0 | Status: DISCONTINUED | OUTPATIENT
Start: 2022-08-20 | End: 2022-08-26

## 2022-08-20 RX ORDER — SODIUM CHLORIDE 9 MG/ML
500 INJECTION INTRAMUSCULAR; INTRAVENOUS; SUBCUTANEOUS ONCE
Refills: 0 | Status: COMPLETED | OUTPATIENT
Start: 2022-08-20 | End: 2022-08-20

## 2022-08-20 RX ORDER — ACETAMINOPHEN 500 MG
650 TABLET ORAL EVERY 6 HOURS
Refills: 0 | Status: DISCONTINUED | OUTPATIENT
Start: 2022-08-20 | End: 2022-08-26

## 2022-08-20 RX ORDER — ONDANSETRON 8 MG/1
4 TABLET, FILM COATED ORAL EVERY 8 HOURS
Refills: 0 | Status: DISCONTINUED | OUTPATIENT
Start: 2022-08-20 | End: 2022-08-26

## 2022-08-20 RX ADMIN — SODIUM CHLORIDE 500 MILLILITER(S): 9 INJECTION INTRAMUSCULAR; INTRAVENOUS; SUBCUTANEOUS at 21:16

## 2022-08-20 NOTE — PATIENT PROFILE ADULT - FALL HARM RISK - HARM RISK INTERVENTIONS

## 2022-08-20 NOTE — ED ADULT NURSE NOTE - NSIMPLEMENTINTERV_GEN_ALL_ED
Implemented All Fall Risk Interventions:  [place] to call system. Call bell, personal items and telephone within reach. Instruct patient to call for assistance. Room bathroom lighting operational. Non-slip footwear when patient is off stretcher. Physically safe environment: no spills, clutter or unnecessary equipment. Stretcher in lowest position, wheels locked, appropriate side rails in place. Provide visual cue, wrist band, yellow gown, etc. Monitor gait and stability. Monitor for mental status changes and reorient to person, place, and time. Review medications for side effects contributing to fall risk. Reinforce activity limits and safety measures with patient and family.

## 2022-08-20 NOTE — ED PROVIDER NOTE - NS ED ROS FT
Constitutional: no fever, chills, no recent weight loss, change in appetite or malaise  Eyes: no redness/discharge/pain/vision changes  ENT: no rhinorrhea/ear pain/sore throat  Cardiac: See HPI.  Respiratory: No cough or respiratory distress  GI: No nausea, vomiting, diarrhea or abdominal pain.  : No dysuria, frequency, urgency or hematuria  MS: no pain to back or extremities, no loss of ROM, no weakness  Neuro: No headache or weakness. No LOC.  Skin: No skin rash.  Endocrine: No history of thyroid disease or diabetes.

## 2022-08-20 NOTE — ED PROVIDER NOTE - ATTENDING APP SHARED VISIT CONTRIBUTION OF CARE
I was present for and supervised the key and critical aspects of the procedures performed during the care of the patient. Patient is a 89-year-old female past medical history of hypertension hypercholesterolemia presents for evaluation of syncope x2 separate episodes today shortly after eating the patient has been having multiple episodes over the past several months with 2 prior traumas causing dens fracture that was noted on the last admission patient was recently discharged from [facility] family observe this and was able to prevent any head injury patient back to baseline and then had a second episode of near syncope which prompted family to call paramedics here in the emergency department the patient denies any symptoms she denies headache visual changes chest pain shortness of breath abdominal pain back pain or palpitations she is able to follow commands the son is here that is corroborating history    Patient is normocephalic atraumatic pupils equally round react light accommodation extraocular muscles intact patient's BRAIN soft collar oropharynx clear chest clear to auscultation bilaterally abdomen soft nontender bowel sounds positive patient is able to move all 4 extremities no edema no edema radial pulses 2+ pedal pulses 2 +    Assessment plan patient presents for evaluation of syncope routine EKG not consistent with STEMI we obtained labs including troponin head CT neck CT given that she sustained 2 syncopal episodes I will admit for further monitoring patient has elevated [place] syncope score

## 2022-08-20 NOTE — ED PROVIDER NOTE - CLINICAL SUMMARY MEDICAL DECISION MAKING FREE TEXT BOX
patient presents for evaluation of syncope routine EKG not consistent with STEMI we obtained labs including troponin head CT neck CT given that she sustained 2 syncopal episodes I will admit for further monitoring patient has elevated [place] syncope score

## 2022-08-20 NOTE — H&P ADULT - HISTORY OF PRESENT ILLNESS
89 years old female history of hypertensions, high cholesterol, aortic aneurysm BIBA from home status post syncopal episode during dinner this evening.  As per son, patient has been having unexplained syncopal episodes since February of this year.  Patient was admitted in February and July for syncope with traumatic injury.  Patient was just discharged from [facility] earlier today after rehab.  Patient was eating dinner this evening and suddenly passed out again on the table.  Family noted before her apple so and was able to catch patient and will patient is onto the table.  Patient woke up couple minutes later and had another episode of near syncope.  Family laid patient on the floor for 30 minutes and called the EMS.  Patient appeared to be confused after she woke up and having rapid breathing at the time.  Patient otherwise denies symptoms in ED.  Does not recall the episode.  Denies headache, worsening neck pain, lower back pain, extremity pains, chest pain and abdominal pain
173

## 2022-08-20 NOTE — PATIENT PROFILE ADULT - FUNCTIONAL ASSESSMENT - BASIC MOBILITY 6.
3-calculated by average/Not able to assess (calculate score using [facility] averaging method)

## 2022-08-20 NOTE — ED ADULT TRIAGE NOTE - CHIEF COMPLAINT QUOTE
as per family pt slummed over and was unresponsive for few minutes. Pt denies CP& SOB & dizziness, states " my family said I passed out ". Pt A&Ox3.

## 2022-08-20 NOTE — H&P ADULT - ASSESSMENT
89 years old female history of hypertensions, high cholesterol, aortic aneurysm BIBA from home status post syncopal episode during dinner this evening.  As per son, patient has been having unexplained syncopal episodes since February of this year.  Patient was admitted in February and July for syncope with traumatic injury.  Patient was just discharged from [facility] earlier today after rehab.  Patient was eating dinner this evening and suddenly passed out again on the table.  Family noted before her apple so and was able to catch patient and will patient is onto the table.  Patient woke up couple minutes later and had another episode of near syncope.  Family laid patient on the floor for 30 minutes and called the EMS.  Patient appeared to be confused after she woke up and having rapid breathing at the time.  Patient otherwise denies symptoms in ED.  Does not recall the episode.  Denies headache, worsening neck pain, lower back pain, extremity pains, chest pain and abdominal pain.      # Syncope  - LRT  - echo  - carotid US  - EKG in am  - ortho VS    # HTN  - vs  c/w home med    # Covid-19  - ID consult

## 2022-08-20 NOTE — ED PROVIDER NOTE - OBJECTIVE STATEMENT
89 years old female history of hypertensions, high cholesterol, aortic aneurysm BIBA from home status post syncopal episode during dinner this evening.  As per son, patient has been having unexplained syncopal episodes since February of this year.  Patient was admitted in February and July for syncope with traumatic injury.  Patient was just discharged from [facility] earlier today after rehab.  Patient was eating dinner this evening and suddenly passed out again on the table.  Family noted before her apple so and was able to catch patient and will patient is onto the table.  Patient woke up couple minutes later and had another episode of near syncope.  Family laid patient on the floor for 30 minutes and called the EMS.  Patient appeared to be confused after she woke up and having rapid breathing at the time.  Patient otherwise denies symptoms in ED.  Does not recall the episode.  Denies headache, worsening neck pain, lower back pain, extremity pains, chest pain and abdominal pain

## 2022-08-20 NOTE — H&P ADULT - NSICDXPASTMEDICALHX_GEN_ALL_CORE_FT
PAST MEDICAL HISTORY:  2019 novel coronavirus disease (COVID-19)     Aneurysm of aorta Thoracic    Bronchiectasis     HTN (hypertension)

## 2022-08-21 LAB
ANION GAP SERPL CALC-SCNC: 9 MMOL/L — SIGNIFICANT CHANGE UP (ref 7–14)
BASOPHILS # BLD AUTO: 0.01 K/UL — SIGNIFICANT CHANGE UP (ref 0–0.2)
BASOPHILS NFR BLD AUTO: 0.2 % — SIGNIFICANT CHANGE UP (ref 0–1)
BUN SERPL-MCNC: 16 MG/DL — SIGNIFICANT CHANGE UP (ref 10–20)
CALCIUM SERPL-MCNC: 8.7 MG/DL — SIGNIFICANT CHANGE UP (ref 8.5–10.1)
CHLORIDE SERPL-SCNC: 102 MMOL/L — SIGNIFICANT CHANGE UP (ref 98–110)
CO2 SERPL-SCNC: 27 MMOL/L — SIGNIFICANT CHANGE UP (ref 17–32)
CREAT SERPL-MCNC: 0.5 MG/DL — LOW (ref 0.7–1.5)
EGFR: 87 ML/MIN/1.73M2 — SIGNIFICANT CHANGE UP
EOSINOPHIL # BLD AUTO: 0.07 K/UL — SIGNIFICANT CHANGE UP (ref 0–0.7)
EOSINOPHIL NFR BLD AUTO: 1.1 % — SIGNIFICANT CHANGE UP (ref 0–8)
GLUCOSE BLDC GLUCOMTR-MCNC: 105 MG/DL — HIGH (ref 70–99)
GLUCOSE SERPL-MCNC: 87 MG/DL — SIGNIFICANT CHANGE UP (ref 70–99)
HCT VFR BLD CALC: 40.3 % — SIGNIFICANT CHANGE UP (ref 37–47)
HGB BLD-MCNC: 13.2 G/DL — SIGNIFICANT CHANGE UP (ref 12–16)
IMM GRANULOCYTES NFR BLD AUTO: 0.3 % — SIGNIFICANT CHANGE UP (ref 0.1–0.3)
LYMPHOCYTES # BLD AUTO: 1.56 K/UL — SIGNIFICANT CHANGE UP (ref 1.2–3.4)
LYMPHOCYTES # BLD AUTO: 25.5 % — SIGNIFICANT CHANGE UP (ref 20.5–51.1)
MCHC RBC-ENTMCNC: 29.9 PG — SIGNIFICANT CHANGE UP (ref 27–31)
MCHC RBC-ENTMCNC: 32.8 G/DL — SIGNIFICANT CHANGE UP (ref 32–37)
MCV RBC AUTO: 91.4 FL — SIGNIFICANT CHANGE UP (ref 81–99)
MONOCYTES # BLD AUTO: 0.9 K/UL — HIGH (ref 0.1–0.6)
MONOCYTES NFR BLD AUTO: 14.7 % — HIGH (ref 1.7–9.3)
NEUTROPHILS # BLD AUTO: 3.56 K/UL — SIGNIFICANT CHANGE UP (ref 1.4–6.5)
NEUTROPHILS NFR BLD AUTO: 58.2 % — SIGNIFICANT CHANGE UP (ref 42.2–75.2)
NRBC # BLD: 0 /100 WBCS — SIGNIFICANT CHANGE UP (ref 0–0)
PLATELET # BLD AUTO: 293 K/UL — SIGNIFICANT CHANGE UP (ref 130–400)
POTASSIUM SERPL-MCNC: 4.3 MMOL/L — SIGNIFICANT CHANGE UP (ref 3.5–5)
POTASSIUM SERPL-SCNC: 4.3 MMOL/L — SIGNIFICANT CHANGE UP (ref 3.5–5)
RBC # BLD: 4.41 M/UL — SIGNIFICANT CHANGE UP (ref 4.2–5.4)
RBC # FLD: 14.1 % — SIGNIFICANT CHANGE UP (ref 11.5–14.5)
SODIUM SERPL-SCNC: 138 MMOL/L — SIGNIFICANT CHANGE UP (ref 135–146)
WBC # BLD: 6.12 K/UL — SIGNIFICANT CHANGE UP (ref 4.8–10.8)
WBC # FLD AUTO: 6.12 K/UL — SIGNIFICANT CHANGE UP (ref 4.8–10.8)

## 2022-08-21 PROCEDURE — 93010 ELECTROCARDIOGRAM REPORT: CPT

## 2022-08-21 PROCEDURE — 99233 SBSQ HOSP IP/OBS HIGH 50: CPT

## 2022-08-21 RX ORDER — HEPARIN SODIUM 5000 [USP'U]/ML
5000 INJECTION INTRAVENOUS; SUBCUTANEOUS EVERY 12 HOURS
Refills: 0 | Status: DISCONTINUED | OUTPATIENT
Start: 2022-08-21 | End: 2022-08-22

## 2022-08-21 RX ADMIN — HEPARIN SODIUM 5000 UNIT(S): 5000 INJECTION INTRAVENOUS; SUBCUTANEOUS at 06:39

## 2022-08-21 RX ADMIN — LOSARTAN POTASSIUM 50 MILLIGRAM(S): 100 TABLET, FILM COATED ORAL at 06:39

## 2022-08-21 RX ADMIN — HEPARIN SODIUM 5000 UNIT(S): 5000 INJECTION INTRAVENOUS; SUBCUTANEOUS at 18:03

## 2022-08-21 NOTE — PROGRESS NOTE ADULT - SUBJECTIVE AND OBJECTIVE BOX
Patient is a 89y old  Female who presents with a chief complaint of Syncope    SUBJECTIVE / OVERNIGHT EVENTS: None       MEDICATIONS  (STANDING):  cholecalciferol 2000 Unit(s) Oral every 24 hours  famotidine    Tablet 20 milliGRAM(s) Oral daily  heparin   Injectable 5000 Unit(s) SubCutaneous every 12 hours  losartan 50 milliGRAM(s) Oral daily  montelukast 10 milliGRAM(s) Oral every 24 hours    MEDICATIONS  (PRN):  acetaminophen     Tablet .. 650 milliGRAM(s) Oral every 6 hours PRN Temp greater or equal to 38C (100.4F), Mild Pain (1 - 3)  aluminum hydroxide/magnesium hydroxide/simethicone Suspension 30 milliLiter(s) Oral every 4 hours PRN Dyspepsia  melatonin 5 milliGRAM(s) Oral at bedtime PRN Insomnia  ondansetron Injectable 4 milliGRAM(s) IV Push every 8 hours PRN Nausea and/or Vomiting        POCT Blood Glucose.: 105 mg/dL (21 Aug 2022 16:45)    I&O's Summary      PHYSICAL EXAM:  Vital Signs Last 24 Hrs  T(C): 36.7 (22 Aug 2022 05:00), Max: 36.9 (21 Aug 2022 21:03)  T(F): 98.1 (22 Aug 2022 05:00), Max: 98.4 (21 Aug 2022 21:03)  HR: 84 (22 Aug 2022 05:00) (78 - 84)  BP: 159/77 (22 Aug 2022 05:00) (145/74 - 171/87)  BP(mean): --  RR: 18 (22 Aug 2022 05:00) (18 - 18)  SpO2: 98% (22 Aug 2022 05:38) (97% - 98%)    Parameters below as of 22 Aug 2022 05:38  Patient On (Oxygen Delivery Method): room air        GENERAL: No acute distress, well-developed  HEAD:  Atraumatic, Normocephalic  EYES: EOMI, PERRLA, conjunctiva and sclera clear  NECK: Supple, no lymphadenopathy, no JVD  CHEST/LUNG: CTAB; No wheezes, rales, or rhonchi  HEART: Regular rate and rhythm; No murmurs, rubs, or gallops  ABDOMEN: Soft, non-tender, non-distended; normal bowel sounds, no organomegaly  EXTREMITIES:  2+ peripheral pulses b/l, No clubbing, cyanosis, or edema  NEUROLOGY: A&O x 3, no focal deficits  SKIN: No rashes or lesions    LABS:                        13.2   6.12  )-----------( 293      ( 21 Aug 2022 07:37 )             40.3     08-21    138  |  102  |  16  ----------------------------<  87  4.3   |  27  |  0.5<L>    Ca    8.7      21 Aug 2022 07:37  Mg     2.0     08-20    TPro  5.9<L>  /  Alb  3.6  /  TBili  <0.2  /  DBili  x   /  AST  12  /  ALT  12  /  AlkPhos  85  08-20      CARDIAC MARKERS ( 20 Aug 2022 20:36 )  x     / <0.01 ng/mL / x     / x     / x          RADIOLOGY & ADDITIONAL TESTS:    < from: CT Head No Cont (08.20.22 @ 20:09) >    IMPRESSION:    CT HEAD:  No acute intracranial findings.    CT CERVICAL SPINE:    Redemonstration of type II dens fracture with increased posterior   angulation of the superior fracture fragment as well as approximately 5   mm posterior displacement since the previous exam. Recommend follow-up   MRI cervical spine.    Redemonstration of multiple fractures of C1 vertebral body with decreased   displacement of fracture fragments as described above.         Patient is a 89y old  Female who presents with a chief complaint of Recurrent Syncope    SUBJECTIVE / OVERNIGHT EVENTS: None       MEDICATIONS  (STANDING):  cholecalciferol 2000 Unit(s) Oral every 24 hours  famotidine    Tablet 20 milliGRAM(s) Oral daily  heparin   Injectable 5000 Unit(s) SubCutaneous every 12 hours  losartan 50 milliGRAM(s) Oral daily  montelukast 10 milliGRAM(s) Oral every 24 hours    MEDICATIONS  (PRN):  acetaminophen     Tablet .. 650 milliGRAM(s) Oral every 6 hours PRN Temp greater or equal to 38C (100.4F), Mild Pain (1 - 3)  aluminum hydroxide/magnesium hydroxide/simethicone Suspension 30 milliLiter(s) Oral every 4 hours PRN Dyspepsia  melatonin 5 milliGRAM(s) Oral at bedtime PRN Insomnia  ondansetron Injectable 4 milliGRAM(s) IV Push every 8 hours PRN Nausea and/or Vomiting        POCT Blood Glucose.: 105 mg/dL (21 Aug 2022 16:45)    I&O's Summary      PHYSICAL EXAM:  Vital Signs Last 24 Hrs  T(C): 36.7 (22 Aug 2022 05:00), Max: 36.9 (21 Aug 2022 21:03)  T(F): 98.1 (22 Aug 2022 05:00), Max: 98.4 (21 Aug 2022 21:03)  HR: 84 (22 Aug 2022 05:00) (78 - 84)  BP: 159/77 (22 Aug 2022 05:00) (145/74 - 171/87)  BP(mean): --  RR: 18 (22 Aug 2022 05:00) (18 - 18)  SpO2: 98% (22 Aug 2022 05:38) (97% - 98%)    Parameters below as of 22 Aug 2022 05:38  Patient On (Oxygen Delivery Method): room air        GENERAL: No acute distress, well-developed  HEAD:  Atraumatic, Normocephalic  EYES: EOMI, PERRLA, conjunctiva and sclera clear  NECK: Neck collar in place   CHEST/LUNG: CTAB; No wheezes, rales, or rhonchi  HEART: Regular rate and rhythm; No murmurs, rubs, or gallops  ABDOMEN: Soft, non-tender, non-distended; normal bowel sounds  EXTREMITIES:  2+ peripheral pulses b/l, No clubbing, cyanosis, or edema  NEUROLOGY: A&O x 3, no focal deficits      LABS:                        13.2   6.12  )-----------( 293      ( 21 Aug 2022 07:37 )             40.3     08-21    138  |  102  |  16  ----------------------------<  87  4.3   |  27  |  0.5<L>    Ca    8.7      21 Aug 2022 07:37  Mg     2.0     08-20    TPro  5.9<L>  /  Alb  3.6  /  TBili  <0.2  /  DBili  x   /  AST  12  /  ALT  12  /  AlkPhos  85  08-20      CARDIAC MARKERS ( 20 Aug 2022 20:36 )  x     / <0.01 ng/mL / x     / x     / x          RADIOLOGY & ADDITIONAL TESTS:    < from: CT Head No Cont (08.20.22 @ 20:09) >    IMPRESSION:    CT HEAD:  No acute intracranial findings.    CT CERVICAL SPINE:    Redemonstration of type II dens fracture with increased posterior   angulation of the superior fracture fragment as well as approximately 5   mm posterior displacement since the previous exam. Recommend follow-up   MRI cervical spine.    Redemonstration of multiple fractures of C1 vertebral body with decreased   displacement of fracture fragments as described above.

## 2022-08-21 NOTE — CONSULT NOTE ADULT - SUBJECTIVE AND OBJECTIVE BOX
Patient is a 89y old  Female who presents with a chief complaint of Syncope (20 Aug 2022 23:05)      REVIEW OF SYSTEMS: Total of twelve systems have been reviewed with patient and found to be negative unless mentioned in HPI      PAST MEDICAL & SURGICAL HISTORY:  Bronchiectasis  Aneurysm of aorta  Thoracic  HTN (hypertension)  2019 novel coronavirus disease (COVID-19)  No significant past surgical history  Excisions of skin cancer on left arm        SOCIAL HISTORY  Alcohol: Does not drink  Tobacco: Does not smoke  Illicit substance use: None      FAMILY HISTORY: Non contributory to the present illness        ALLERGIES: penicillin (Eye Irritation)  sulfa drugs (Eye Irritation)      Vital Signs Last 24 Hrs  T(C): 35.6 (21 Aug 2022 13:53), Max: 36.6 (20 Aug 2022 23:40)  T(F): 96 (21 Aug 2022 13:53), Max: 97.9 (20 Aug 2022 23:40)  HR: 80 (21 Aug 2022 13:53) (79 - 101)  BP: 145/74 (21 Aug 2022 13:53) (133/76 - 164/82)  BP(mean): --  RR: 18 (21 Aug 2022 05:00) (18 - 19)  SpO2: 94% (21 Aug 2022 06:52) (94% - 97%)    Parameters below as of 21 Aug 2022 06:52  Patient On (Oxygen Delivery Method): room air          PHYSICAL EXAM:  GENERAL: Not in distress   CHEST/LUNG:  Aire ntry bilaterally  HEART: s1 and s2 present  ABDOMEN:  Nontender and  Nondistended  EXTREMITIES: No pedal  edema  CNS: Awake and Alert      LABS:                        13.2   6.12  )-----------( 293      ( 21 Aug 2022 07:37 )             40.3       08-21    138  |  102  |  16  ----------------------------<  87  4.3   |  27  |  0.5<L>    Ca    8.7      21 Aug 2022 07:37  Mg     2.0     08-20    TPro  5.9<L>  /  Alb  3.6  /  TBili  <0.2  /  DBili  x   /  AST  12  /  ALT  12  /  AlkPhos  85  08-20          MEDICATIONS  (STANDING):  heparin   Injectable 5000 Unit(s) SubCutaneous every 12 hours  losartan 50 milliGRAM(s) Oral daily    MEDICATIONS  (PRN):  acetaminophen     Tablet .. 650 milliGRAM(s) Oral every 6 hours PRN Temp greater or equal to 38C (100.4F), Mild Pain (1 - 3)  aluminum hydroxide/magnesium hydroxide/simethicone Suspension 30 milliLiter(s) Oral every 4 hours PRN Dyspepsia  melatonin 5 milliGRAM(s) Oral at bedtime PRN Insomnia  ondansetron Injectable 4 milliGRAM(s) IV Push every 8 hours PRN Nausea and/or Vomiting        RADIOLOGY & ADDITIONAL TESTS:    < from: Xray Pelvis AP only (08.20.22 @ 20:50) >  No evidence of acute fracture.    < end of copied text >    < from: Xray Chest 1 View- PORTABLE-Urgent (08.20.22 @ 20:50) >  Impression:  There are bilateral opacities. No evidence of pneumothorax.        MICROBIOLOGY DATA:    COVID-19 PCR (08.20.22 @ 20:50)   COVID-19 PCR: Detected:            Patient is a 89y old  Female with history of hypertensions, high cholesterol, aortic aneurysm, now BIBA from home status post syncopal episode during dinner this evening.  As per son, patient has been having unexplained syncopal episodes since February of this year.  Patient was admitted in February and July for syncope with traumatic injury.  Patient was just discharged from [facility] earlier after rehab.  Patient was eating dinner this evening and suddenly passed out again on the table.   Patient woke up couple minutes later and had another episode of near syncope.  Family laid patient on the floor for 30 minutes and called the EMS.  Patient appeared to be confused after she woke up and having rapid breathing at the time.  Patient otherwise denies symptoms in ED.  Does not recall the episode.  Denies headache, worsening neck pain. On admission, she found to have no fever, but tachycardia and positive COVID PCR. The CXR shows B/L Opacities but she is not hypoxic. The ID consult requested to assist with further management of COVID.      REVIEW OF SYSTEMS: Total of twelve systems have been reviewed and found to be negative unless mentioned in HPI      PAST MEDICAL & SURGICAL HISTORY:  Bronchiectasis  Aneurysm of aorta  Thoracic  HTN (hypertension)  2019 novel coronavirus disease (COVID-19)  No significant past surgical history  Excisions of skin cancer on left arm        SOCIAL HISTORY  Alcohol: Does not drink  Tobacco: Does not smoke  Illicit substance use: None      FAMILY HISTORY: Non contributory to the present illness        ALLERGIES: penicillin (Eye Irritation)  sulfa drugs (Eye Irritation)      Vital Signs Last 24 Hrs  T(C): 35.6 (21 Aug 2022 13:53), Max: 36.6 (20 Aug 2022 23:40)  T(F): 96 (21 Aug 2022 13:53), Max: 97.9 (20 Aug 2022 23:40)  HR: 80 (21 Aug 2022 13:53) (79 - 101)  BP: 145/74 (21 Aug 2022 13:53) (133/76 - 164/82)  BP(mean): --  RR: 18 (21 Aug 2022 05:00) (18 - 19)  SpO2: 94% (21 Aug 2022 06:52) (94% - 97%)    Parameters below as of 21 Aug 2022 06:52  Patient On (Oxygen Delivery Method): room air        PHYSICAL EXAM:  GENERAL: Not in distress   HEENT: C-collar in -placed  CHEST/LUNG:  Not using accessory muscles  ABDOMEN:  Nondistended  EXTREMITIES: No pedal  edema  CNS: Awake and Alert      LABS:                        13.2   6.12  )-----------( 293      ( 21 Aug 2022 07:37 )             40.3       08-21    138  |  102  |  16  ----------------------------<  87  4.3   |  27  |  0.5<L>    Ca    8.7      21 Aug 2022 07:37  Mg     2.0     08-20    TPro  5.9<L>  /  Alb  3.6  /  TBili  <0.2  /  DBili  x   /  AST  12  /  ALT  12  /  AlkPhos  85  08-20          MEDICATIONS  (STANDING):  heparin   Injectable 5000 Unit(s) SubCutaneous every 12 hours  losartan 50 milliGRAM(s) Oral daily    MEDICATIONS  (PRN):  acetaminophen     Tablet .. 650 milliGRAM(s) Oral every 6 hours PRN Temp greater or equal to 38C (100.4F), Mild Pain (1 - 3)  aluminum hydroxide/magnesium hydroxide/simethicone Suspension 30 milliLiter(s) Oral every 4 hours PRN Dyspepsia  melatonin 5 milliGRAM(s) Oral at bedtime PRN Insomnia  ondansetron Injectable 4 milliGRAM(s) IV Push every 8 hours PRN Nausea and/or Vomiting        RADIOLOGY & ADDITIONAL TESTS:    < from: Xray Pelvis AP only (08.20.22 @ 20:50) >  No evidence of acute fracture.      < from: Xray Chest 1 View- PORTABLE-Urgent (08.20.22 @ 20:50) >  Impression:  There are bilateral opacities. No evidence of pneumothorax.        MICROBIOLOGY DATA:    COVID-19 PCR (08.20.22 @ 20:50)   COVID-19 PCR: Detected:

## 2022-08-21 NOTE — CONSULT NOTE ADULT - ASSESSMENT
Patient is a 89y old  Female with history of hypertensions, high cholesterol, aortic aneurysm, now BIBA from home status post syncopal episode during dinner this evening.  As per son, patient has been having unexplained syncopal episodes since February of this year.  Patient was admitted in February and July for syncope with traumatic injury.  Patient was just discharged from [facility] earlier after rehab.  Patient was eating dinner this evening and suddenly passed out again on the table.   Patient woke up couple minutes later and had another episode of near syncope.  Family laid patient on the floor for 30 minutes and called the EMS.  Patient appeared to be confused after she woke up and having rapid breathing at the time.  Patient otherwise denies symptoms in ED.  Does not recall the episode.  Denies headache, worsening neck pain. On admission, she found to have no fever, but tachycardia and positive COVID PCR. The CXR shows B/L Opacities but she is not hypoxic. The ID consult requested to assist with further management of COVID.    # Positive COVID  # S/p Syncopal episode    would recommend:    1. Please monitor oxygen saturation closely if drop to 94 % then start on dexamethasone and Remdesivir  2. Supportive care for COVID including AC  3. Management of Syncopal episode as per Primary/ Cardiology and Neurology team  4. COVID precautions    will follow the patient with you and make further recommendation based on the clinical course and Lab results  Thank you for the opportunity to participate in Ms. BUCHANAN's care    Attending Attestation:    Spent more than 65 minutes on total encounter, more than 50 % of the visit was spent counseling and/or coordinating care by the Attending physician.

## 2022-08-21 NOTE — PROGRESS NOTE ADULT - ASSESSMENT
Syncope, Recurrent Episodes   -Denies Prior Symptoms, but has Post-Ictal confusion   -Possible Seizures? given  Meningioma, will consult Neuro   -EKG and Troponin Negative   -Will get Orthostatic Vitals, Carotid US, 2D-Echo      Type II Dens Fracture on CT with Increased Posterior Angulation   -Neurosurgery Consult     Essential HTN: controlled   c/w Losartan       Covid-19: Asymptomatic   ID consult appreciated  -Start Dexa and Remdesivir if Pulse-ox below 94%   -COVID Precautions    Syncope, Recurrent Episodes   -Denies Prior Symptoms, but has Post-Ictal confusion   -Possible Seizures? given  Meningioma, will consult Neuro   -EKG and Troponin Negative   -Will get Orthostatic Vitals, Carotid US, 2D-Echo and cardio consult       Type II Dens Fracture on CT with Increased Posterior Angulation   -Neurosurgery Consult   -Already has neck collar in place **    Essential HTN: controlled   c/w Losartan     Covid-19: Asymptomatic   ID consult appreciated  -Start Dexa and Remdesivir if Pulse-ox below 94%   -COVID Precautions     DVT PPX: Lovenox   GI PPX: H2 blockers   Full Code  Dispo: from home (recently discharged from NH)   -Pending completion Syncope w/up

## 2022-08-22 PROCEDURE — 99222 1ST HOSP IP/OBS MODERATE 55: CPT

## 2022-08-22 PROCEDURE — 99221 1ST HOSP IP/OBS SF/LOW 40: CPT

## 2022-08-22 PROCEDURE — 99233 SBSQ HOSP IP/OBS HIGH 50: CPT

## 2022-08-22 RX ORDER — FAMOTIDINE 10 MG/ML
20 INJECTION INTRAVENOUS DAILY
Refills: 0 | Status: DISCONTINUED | OUTPATIENT
Start: 2022-08-22 | End: 2022-08-26

## 2022-08-22 RX ORDER — CHOLECALCIFEROL (VITAMIN D3) 125 MCG
2000 CAPSULE ORAL EVERY 24 HOURS
Refills: 0 | Status: DISCONTINUED | OUTPATIENT
Start: 2022-08-22 | End: 2022-08-26

## 2022-08-22 RX ORDER — MONTELUKAST 4 MG/1
10 TABLET, CHEWABLE ORAL EVERY 24 HOURS
Refills: 0 | Status: DISCONTINUED | OUTPATIENT
Start: 2022-08-22 | End: 2022-08-26

## 2022-08-22 RX ORDER — ENOXAPARIN SODIUM 100 MG/ML
30 INJECTION SUBCUTANEOUS EVERY 24 HOURS
Refills: 0 | Status: DISCONTINUED | OUTPATIENT
Start: 2022-08-22 | End: 2022-08-26

## 2022-08-22 RX ADMIN — FAMOTIDINE 20 MILLIGRAM(S): 10 INJECTION INTRAVENOUS at 11:40

## 2022-08-22 RX ADMIN — LOSARTAN POTASSIUM 50 MILLIGRAM(S): 100 TABLET, FILM COATED ORAL at 05:33

## 2022-08-22 RX ADMIN — HEPARIN SODIUM 5000 UNIT(S): 5000 INJECTION INTRAVENOUS; SUBCUTANEOUS at 17:50

## 2022-08-22 RX ADMIN — MONTELUKAST 10 MILLIGRAM(S): 4 TABLET, CHEWABLE ORAL at 06:14

## 2022-08-22 RX ADMIN — ENOXAPARIN SODIUM 30 MILLIGRAM(S): 100 INJECTION SUBCUTANEOUS at 19:46

## 2022-08-22 RX ADMIN — HEPARIN SODIUM 5000 UNIT(S): 5000 INJECTION INTRAVENOUS; SUBCUTANEOUS at 05:33

## 2022-08-22 RX ADMIN — Medication 2000 UNIT(S): at 11:40

## 2022-08-22 NOTE — PHYSICAL THERAPY INITIAL EVALUATION ADULT - GAIT DEVIATIONS NOTED, PT EVAL
guarded posture, dec heel strike/pushoff/decreased benjamin/decreased step length/decreased weight-shifting ability

## 2022-08-22 NOTE — PHYSICAL THERAPY INITIAL EVALUATION ADULT - PERTINENT HX OF CURRENT PROBLEM, REHAB EVAL
89 y/o female admitted with diagnosis of Syncope, after episode of passing out at home ; no acute pathology on Head CT; redemonstration of multiple fracture of C1 vertebral body on CT Cervical Spine; no acute fracture on Pelvic X-ray; tested positive for Covid-19 on admission

## 2022-08-22 NOTE — PROGRESS NOTE ADULT - ASSESSMENT
Patient is a 89y old  Female with history of hypertensions, high cholesterol, aortic aneurysm, now BIBA from home status post syncopal episode during dinner this evening.  As per son, patient has been having unexplained syncopal episodes since February of this year.  Patient was admitted in February and July for syncope with traumatic injury.  Patient was just discharged from [facility] earlier after rehab.  Patient was eating dinner this evening and suddenly passed out again on the table.   Patient woke up couple minutes later and had another episode of near syncope.  Family laid patient on the floor for 30 minutes and called the EMS.  Patient appeared to be confused after she woke up and having rapid breathing at the time.  Patient otherwise denies symptoms in ED.  Does not recall the episode.  Denies headache, worsening neck pain. On admission, she found to have no fever, but tachycardia and positive COVID PCR. The CXR shows B/L Opacities but she is not hypoxic. The ID consult requested to assist with further management of COVID.    # Positive COVID  # S/p Syncopal episode    would recommend:    1. Continue to monitor oxygen saturation closely if drop to 94 % then start on dexamethasone and Remdesivir  2. Supportive care for COVID including AC  3. Management of Syncopal episode as per Primary/ Cardiology and Neurology team  4. COVID precautions    Attending Attestation:    Spent more than 45 minutes on total encounter, more than 50 % of the visit was spent counseling and/or coordinating care by the Attending physician.

## 2022-08-22 NOTE — PROGRESS NOTE ADULT - SUBJECTIVE AND OBJECTIVE BOX
Patient is a 89y old  Female who presents with a chief complaint of Recurrent Syncope    SUBJECTIVE / OVERNIGHT EVENTS: none     ADDITIONAL REVIEW OF SYSTEMS:    MEDICATIONS  (STANDING):  cholecalciferol 2000 Unit(s) Oral every 24 hours  enoxaparin Injectable 30 milliGRAM(s) SubCutaneous every 24 hours  famotidine    Tablet 20 milliGRAM(s) Oral daily  losartan 50 milliGRAM(s) Oral daily  montelukast 10 milliGRAM(s) Oral every 24 hours    MEDICATIONS  (PRN):  acetaminophen     Tablet .. 650 milliGRAM(s) Oral every 6 hours PRN Temp greater or equal to 38C (100.4F), Mild Pain (1 - 3)  aluminum hydroxide/magnesium hydroxide/simethicone Suspension 30 milliLiter(s) Oral every 4 hours PRN Dyspepsia  melatonin 5 milliGRAM(s) Oral at bedtime PRN Insomnia  ondansetron Injectable 4 milliGRAM(s) IV Push every 8 hours PRN Nausea and/or Vomiting      PHYSICAL EXAM:  Vital Signs Last 24 Hrs  T(C): 36.4 (22 Aug 2022 14:26), Max: 36.9 (21 Aug 2022 21:03)  T(F): 97.5 (22 Aug 2022 14:26), Max: 98.4 (21 Aug 2022 21:03)  HR: 89 (22 Aug 2022 14:26) (78 - 89)  BP: 103/60 (22 Aug 2022 14:26) (103/60 - 171/87)  BP(mean): --  RR: 18 (22 Aug 2022 14:26) (18 - 18)  SpO2: 98% (22 Aug 2022 05:38) (97% - 98%)    Parameters below as of 22 Aug 2022 05:38  Patient On (Oxygen Delivery Method): room air    GENERAL: No acute distress, well-developed  HEAD:  Atraumatic, Normocephalic  EYES: EOMI, PERRLA, conjunctiva and sclera clear  NECK: Neck collar in place   CHEST/LUNG: CTAB; No wheezes, rales, or rhonchi  HEART: Regular rate and rhythm; No murmurs, rubs, or gallops  ABDOMEN: Soft, non-tender, non-distended; normal bowel sounds  EXTREMITIES:  2+ peripheral pulses b/l, No clubbing, cyanosis, or edema  NEUROLOGY: A&O x 3, no focal deficits        LABS:                        13.2   6.12  )-----------( 293      ( 21 Aug 2022 07:37 )             40.3     08-21    138  |  102  |  16  ----------------------------<  87  4.3   |  27  |  0.5<L>    Ca    8.7      21 Aug 2022 07:37  Mg     2.0     08-20    TPro  5.9<L>  /  Alb  3.6  /  TBili  <0.2  /  DBili  x   /  AST  12  /  ALT  12  /  AlkPhos  85  08-20      CARDIAC MARKERS ( 20 Aug 2022 20:36 )  x     / <0.01 ng/mL / x     / x     / x

## 2022-08-22 NOTE — PROGRESS NOTE ADULT - ASSESSMENT
Syncope, Recurrent Episodes   -Denies symptoms prior to episode Prior, but has Post-Ictal confusion   -Possible Seizures? given  Meningioma: Neuro consult appreciated, Team will evaluate for seizures   *****F/up REEG And VEEG***  -EKG and Troponin Negative  , Orthostatic Vitals Positive: Compression stockings and abdominal binder with adequate hydration   -, Carotid US cannot be done secondary to Type II dens fracture with Neck collar in place   -F/up  2D-Echo and cardio consult       Type II Dens Fracture on CT with Increased Posterior Angulation   -Neurosurgery Consult   -Already has neck collar in place **    Essential HTN: controlled   c/w Losartan     Covid-19: Asymptomatic   ID consult appreciated  -Start Dexa and Remdesivir if Pulse-ox below 94%   -COVID Precautions     DVT PPX: Lovenox   GI PPX: H2 blockers   Full Code  Dispo: from home (recently discharged from NH)   -Pending completion Syncope w/up

## 2022-08-22 NOTE — CONSULT NOTE ADULT - ASSESSMENT
89 y/o female history of hypertensions, high cholesterol, aortic aneurysm BIBA from home status post syncopal episode during dinner. CT head negative. C-spine redemonstration of multiple fractures of C1 vertebral body with decreased displacement of fracture fragments. Orthostatic vitals positive, systolic drop from sitting 164 to 95 with standing. Recurrent syncopal episodes since February of this year, associated with falls at times.       Patient p/w syncope----  Etiology:  neurocardiogenic (Vasovagal) vs orthostatic vs arrhythmic vs neurologic    #Likely orthostatic given positive VS    -ECG: NS, non ischemic. No bradycardia, pauses, AV blocks or arrhthymias noted. No changes from baseline  -On telemonitor appears in NSR with HR 70-90's      Plan:  -IVF for positive orthostatic VS  -Lipid profile, TSH, A1C  -Check Carotid US  -Fall precautions   -TTE to r/o structural/valvular abnormalities and to evaluate cardiac function   -Can consider [facility] as outpatient. F/u with cardiology  -F/u with Neurology reccs  -C/w home meds  -Monitor lytes      Discussed with Dr Pagan

## 2022-08-22 NOTE — PROGRESS NOTE ADULT - SUBJECTIVE AND OBJECTIVE BOX
Patient is seen and examined at the bed side, is afebrile. She is saturating well in Room air.       REVIEW OF SYSTEMS: All other review systems are negative        ALLERGIES: penicillin (Eye Irritation)  sulfa drugs (Eye Irritation)      Vital Signs Last 24 Hrs  T(C): 36.4 (22 Aug 2022 14:26), Max: 36.9 (21 Aug 2022 21:03)  T(F): 97.5 (22 Aug 2022 14:26), Max: 98.4 (21 Aug 2022 21:03)  HR: 89 (22 Aug 2022 14:26) (78 - 89)  BP: 103/60 (22 Aug 2022 14:26) (103/60 - 171/87)  BP(mean): --  RR: 18 (22 Aug 2022 14:26) (18 - 18)  SpO2: 98% (22 Aug 2022 05:38) (97% - 98%)    Parameters below as of 22 Aug 2022 05:38  Patient On (Oxygen Delivery Method): room air        PHYSICAL EXAM:  GENERAL: Not in distress   HEENT: C-collar in -placed  CHEST/LUNG:  Not using accessory muscles  ABDOMEN:  Nondistended  EXTREMITIES: No pedal  edema  CNS: Awake and Alert      LABS: No new Labs                                   13.2   6.12  )-----------( 293      ( 21 Aug 2022 07:37 )             40.3       08-21    138  |  102  |  16  ----------------------------<  87  4.3   |  27  |  0.5<L>    Ca    8.7      21 Aug 2022 07:37  Mg     2.0     08-20    TPro  5.9<L>  /  Alb  3.6  /  TBili  <0.2  /  DBili  x   /  AST  12  /  ALT  12  /  AlkPhos  85  08-20          MEDICATIONS  (STANDING):    cholecalciferol 2000 Unit(s) Oral every 24 hours  enoxaparin Injectable 30 milliGRAM(s) SubCutaneous every 24 hours  famotidine    Tablet 20 milliGRAM(s) Oral daily  losartan 50 milliGRAM(s) Oral daily  montelukast 10 milliGRAM(s) Oral every 24 hours      RADIOLOGY & ADDITIONAL TESTS:    < from: Xray Pelvis AP only (08.20.22 @ 20:50) >  No evidence of acute fracture.      < from: Xray Chest 1 View- PORTABLE-Urgent (08.20.22 @ 20:50) >  Impression:  There are bilateral opacities. No evidence of pneumothorax.        MICROBIOLOGY DATA:    COVID-19 PCR (08.20.22 @ 20:50)   COVID-19 PCR: Detected:

## 2022-08-22 NOTE — CONSULT NOTE ADULT - SUBJECTIVE AND OBJECTIVE BOX
Neurology Follow up note    Name  CHAZ BUCHANAN    HPI:  89 years old female history of hypertensions, high cholesterol, aortic aneurysm BIBA from home status post syncopal episode during dinner this evening.  As per son, patient has been having unexplained syncopal episodes since February of this year.  Patient was admitted in February and July for syncope with traumatic injury.  Patient was just discharged from [facility] earlier today after rehab.  Patient was eating dinner this evening and suddenly passed out again on the table.  Family noted before her apple so and was able to catch patient and will patient is onto the table.  Patient woke up couple minutes later and had another episode of near syncope.  Family laid patient on the floor for 30 minutes and called the EMS.  Patient appeared to be confused after she woke up and having rapid breathing at the time.  Patient otherwise denies symptoms in ED.  Does not recall the episode.  Denies headache, worsening neck pain, lower back pain, extremity pains, chest pain and abdominal pain (20 Aug 2022 23:05)      Neurology Interval History - Pt reports was just discharged from [facility] was home , eating dinner and had passed out. As per EMR ,family members were able to catch and place on floor. PT does not recall what happened Pt does not know how she got to the hospital. PT AOx 3. Pt denies hx of syncope in the past. Pt denies hx of seizure or family hx of seizure. Pt denies hitting head.           Vital Signs Last 24 Hrs  T(C): 36.7 (22 Aug 2022 05:00), Max: 36.9 (21 Aug 2022 21:03)  T(F): 98.1 (22 Aug 2022 05:00), Max: 98.4 (21 Aug 2022 21:03)  HR: 84 (22 Aug 2022 05:00) (78 - 84)  BP: 159/77 (22 Aug 2022 05:00) (145/74 - 171/87)  BP(mean): --  RR: 18 (22 Aug 2022 05:00) (18 - 18)  SpO2: 98% (22 Aug 2022 05:38) (97% - 98%)    Parameters below as of 22 Aug 2022 05:38  Patient On (Oxygen Delivery Method): room air    Neurological Exam:   Mental status: Awake, alert and oriented x3.  Recent and remote memory intact.   Attention/concentration intact.  No dysarthria, no aphasia.  Fund of knowledge appropriate.    Cranial nerves:  pupils equally round and reactive to light, visual fields full, no nystagmus, extraocular muscles intact,  face symmetric,  tongue was midline no bitting marks. neck on collar.    Motor:  Normal bulk and tone, strength 5/5 in bilateral upper and lower extremities.   strength 5/5.  Rapid alternating movements intact and symmetric.   Sensation: Intact to light touch, proprioception, and pinprick.  No neglect.   Coordination: No dysmetria on finger-to-nose and heel-to-shin.  No clumsiness.  Reflexes: 2+ in upper and lower extremities, downgoing toes bilaterally      Medications  acetaminophen     Tablet .. 650 milliGRAM(s) Oral every 6 hours PRN  aluminum hydroxide/magnesium hydroxide/simethicone Suspension 30 milliLiter(s) Oral every 4 hours PRN  cholecalciferol 2000 Unit(s) Oral every 24 hours  famotidine    Tablet 20 milliGRAM(s) Oral daily  heparin   Injectable 5000 Unit(s) SubCutaneous every 12 hours  losartan 50 milliGRAM(s) Oral daily  melatonin 5 milliGRAM(s) Oral at bedtime PRN  montelukast 10 milliGRAM(s) Oral every 24 hours  ondansetron Injectable 4 milliGRAM(s) IV Push every 8 hours PRN      Lab                        13.2   6.12  )-----------( 293      ( 21 Aug 2022 07:37 )             40.3     08-21    138  |  102  |  16  ----------------------------<  87  4.3   |  27  |  0.5<L>    Ca    8.7      21 Aug 2022 07:37  Mg     2.0     08-20    TPro  5.9<L>  /  Alb  3.6  /  TBili  <0.2  /  DBili  x   /  AST  12  /  ALT  12  /  AlkPhos  85  08-20    CAPILLARY BLOOD GLUCOSE      POCT Blood Glucose.: 105 mg/dL (21 Aug 2022 16:45)    LIVER FUNCTIONS - ( 20 Aug 2022 20:36 )  Alb: 3.6 g/dL / Pro: 5.9 g/dL / ALK PHOS: 85 U/L / ALT: 12 U/L / AST: 12 U/L / GGT: x           Radiology  < from: CT Cervical Spine No Cont (08.20.22 @ 20:13) >  CT HEAD:  No acute intracranial findings.    CT CERVICAL SPINE:    Redemonstration of type II dens fracture with increased posterior   angulation of the superior fracture fragment as well as approximately 5   mm posterior displacement since the previous exam. Recommend follow-up   MRI cervical spine.    Redemonstration of multiple fractures of C1 vertebral body with decreased   displacement of fracture fragments as described above.      Callback attempted at 8:58 PM 8/20/2022.    --- End of Report ---    BRETT QUEEN MD; Resident Radiologist  This document has been electronically signed.    < end of copied text >     Neurology Follow up note    Name  CHAZ BUCHANAN    HPI:  89 years old female history of hypertensions, high cholesterol, aortic aneurysm BIBA from home status post syncopal episode during dinner this evening.  As per son, patient has been having unexplained syncopal episodes since February of this year.  Patient was admitted in February and July for syncope with traumatic injury.  Patient was just discharged from [facility] earlier today after rehab.  Patient was eating dinner this evening and suddenly passed out again on the table.  Family noted before her apple so and was able to catch patient and will patient is onto the table.  Patient woke up couple minutes later and had another episode of near syncope.  Family laid patient on the floor for 30 minutes and called the EMS.  Patient appeared to be confused after she woke up and having rapid breathing at the time.  Patient otherwise denies symptoms in ED.  Does not recall the episode.  Denies headache, worsening neck pain, lower back pain, extremity pains, chest pain and abdominal pain (20 Aug 2022 23:05)      Neurology Interval History - Pt reports was just discharged from [facility] was home , eating dinner and had passed out. As per EMR ,family members were able to catch and place on floor. PT does not recall what happened Pt does not know how she got to the hospital. PT AOx 2. Pt denies hx of syncope in the past. Pt denies hx of seizure or family hx of seizure. Pt denies hitting head.           Vital Signs Last 24 Hrs  T(C): 36.7 (22 Aug 2022 05:00), Max: 36.9 (21 Aug 2022 21:03)  T(F): 98.1 (22 Aug 2022 05:00), Max: 98.4 (21 Aug 2022 21:03)  HR: 84 (22 Aug 2022 05:00) (78 - 84)  BP: 159/77 (22 Aug 2022 05:00) (145/74 - 171/87)  BP(mean): --  RR: 18 (22 Aug 2022 05:00) (18 - 18)  SpO2: 98% (22 Aug 2022 05:38) (97% - 98%)    Parameters below as of 22 Aug 2022 05:38  Patient On (Oxygen Delivery Method): room air    Neurological Exam:   Mental status: Awake, alert and oriented x2-3. Poor recent memory. Repeats questions but comes up with answers.   Cranial nerves:  pupils equally round and reactive to light, visual fields full, no nystagmus, extraocular muscles intact,  face symmetric,  tongue was midline no bitting marks. neck on collar.    Motor:  Normal bulk and tone, strength 5/5 in bilateral upper and lower extremities.   strength 5/5.  Sensation: Intact to light touch, proprioception, and pinprick.  No neglect.   Coordination: No dysmetria on finger-to-nose and heel-to-shin.  No clumsiness.  Reflexes: 2+ in upper and lower extremities, downgoing toes bilaterally      Medications  acetaminophen     Tablet .. 650 milliGRAM(s) Oral every 6 hours PRN  aluminum hydroxide/magnesium hydroxide/simethicone Suspension 30 milliLiter(s) Oral every 4 hours PRN  cholecalciferol 2000 Unit(s) Oral every 24 hours  famotidine    Tablet 20 milliGRAM(s) Oral daily  heparin   Injectable 5000 Unit(s) SubCutaneous every 12 hours  losartan 50 milliGRAM(s) Oral daily  melatonin 5 milliGRAM(s) Oral at bedtime PRN  montelukast 10 milliGRAM(s) Oral every 24 hours  ondansetron Injectable 4 milliGRAM(s) IV Push every 8 hours PRN      Lab                        13.2   6.12  )-----------( 293      ( 21 Aug 2022 07:37 )             40.3     08-21    138  |  102  |  16  ----------------------------<  87  4.3   |  27  |  0.5<L>    Ca    8.7      21 Aug 2022 07:37  Mg     2.0     08-20    TPro  5.9<L>  /  Alb  3.6  /  TBili  <0.2  /  DBili  x   /  AST  12  /  ALT  12  /  AlkPhos  85  08-20    CAPILLARY BLOOD GLUCOSE      POCT Blood Glucose.: 105 mg/dL (21 Aug 2022 16:45)    LIVER FUNCTIONS - ( 20 Aug 2022 20:36 )  Alb: 3.6 g/dL / Pro: 5.9 g/dL / ALK PHOS: 85 U/L / ALT: 12 U/L / AST: 12 U/L / GGT: x           Radiology  < from: CT Cervical Spine No Cont (08.20.22 @ 20:13) >  CT HEAD:  No acute intracranial findings.    CT CERVICAL SPINE:    Redemonstration of type II dens fracture with increased posterior   angulation of the superior fracture fragment as well as approximately 5   mm posterior displacement since the previous exam. Recommend follow-up   MRI cervical spine.    Redemonstration of multiple fractures of C1 vertebral body with decreased   displacement of fracture fragments as described above.      Callback attempted at 8:58 PM 8/20/2022.    --- End of Report ---    BRETT QUEEN MD; Resident Radiologist  This document has been electronically signed.    < end of copied text >

## 2022-08-22 NOTE — CONSULT NOTE ADULT - SUBJECTIVE AND OBJECTIVE BOX
HPI:  89 years old female history of hypertensions, high cholesterol, aortic aneurysm BIBA from home status post syncopal episode during dinner this evening.  As per son, patient has been having unexplained syncopal episodes since February of this year.  Patient was admitted in February and July for syncope with traumatic injury.  Patient was just discharged from [facility] earlier today after rehab.  Patient was eating dinner this evening and suddenly passed out again on the table.  Family noted before her apple so and was able to catch patient and will patient is onto the table.  Patient woke up couple minutes later and had another episode of near syncope.  Family laid patient on the floor for 30 minutes and called the EMS.  Patient appeared to be confused after she woke up and having rapid breathing at the time.  Patient otherwise denies symptoms in ED.  Does not recall the episode.  Denies headache, worsening neck pain, lower back pain, extremity pains, chest pain and abdominal pain (20 Aug 2022 23:05)        HPI-Cardiology   Pt with above Hx, evaluated at bedside. Currently admitted in telemetry for eval of syncope. Radiology tests and hospital records, were reviewed, as well as previous notes on this patient.       PAST MEDICAL & SURGICAL HISTORY  Bronchiectasis    Aneurysm of aorta  Thoracic    HTN (hypertension)    2019 novel coronavirus disease (COVID-19)    No significant past surgical history  Excisions of skin cancer on left arm          ALLERGIES:  penicillin (Eye Irritation)  sulfa drugs (Eye Irritation)      MEDICATIONS:  MEDICATIONS  (STANDING):  cholecalciferol 2000 Unit(s) Oral every 24 hours  enoxaparin Injectable 30 milliGRAM(s) SubCutaneous every 24 hours  famotidine    Tablet 20 milliGRAM(s) Oral daily  losartan 50 milliGRAM(s) Oral daily  montelukast 10 milliGRAM(s) Oral every 24 hours    MEDICATIONS  (PRN):  acetaminophen     Tablet .. 650 milliGRAM(s) Oral every 6 hours PRN Temp greater or equal to 38C (100.4F), Mild Pain (1 - 3)  aluminum hydroxide/magnesium hydroxide/simethicone Suspension 30 milliLiter(s) Oral every 4 hours PRN Dyspepsia  melatonin 5 milliGRAM(s) Oral at bedtime PRN Insomnia  ondansetron Injectable 4 milliGRAM(s) IV Push every 8 hours PRN Nausea and/or Vomiting      HOME MEDICATIONS:  Home Medications:  acetaminophen 325 mg oral tablet: 2 tab(s) orally every 6 hours, As needed, for pain or fever (01 Aug 2022 14:17)  alendronate 10 mg oral tablet: 1 tab(s) orally once a day at 6AM--take in upright position with 8 ounces of water and remain upright x 60 minutes after taking it; no food or other meds for 60 minutes after taking it (01 Aug 2022 14:17)  heparin: 5000 unit(s) subcutaneous every 8 hours (01 Aug 2022 14:17)  losartan 50 mg oral tablet: 1 tab(s) orally once a day -- PLEASE HOLD FOR SBP less than 120 (01 Aug 2022 14:17)  melatonin 3 mg oral tablet: 1 tab(s) orally once a day (at bedtime), As needed, Insomnia (01 Aug 2022 14:17)  senna leaf extract oral tablet: 2 tab(s) orally once a day (at bedtime), As needed, Constipation (01 Aug 2022 14:17)      VITALS:   T(F): 97.5 (08-22 @ 14:26), Max: 98.4 (08-21 @ 21:03)  HR: 89 (08-22 @ 14:26) (78 - 101)  BP: 103/60 (08-22 @ 14:26) (103/60 - 171/87)  BP(mean): --  RR: 18 (08-22 @ 14:26) (18 - 19)  SpO2: 98% (08-22 @ 05:38) (94% - 98%)          REVIEW OF SYSTEMS:  See HPI      PHYSICAL EXAM:  NEURO: patient is awake , alert and oriented  GEN: Not in acute distress  NECK: no thyroid enlargement, no JVD  LUNGS: Clear to auscultation bilaterally   CARDIOVASCULAR: S1/S2 present, RRR , no murmurs or rubs, no carotid bruits,  + PP bilaterally  ABD: Soft, non-tender, non-distended, +BS  EXT: No CHUY  SKIN: Intact    LABS:                        13.2   6.12  )-----------( 293      ( 21 Aug 2022 07:37 )             40.3     08-21    138  |  102  |  16  ----------------------------<  87  4.3   |  27  |  0.5<L>    Ca    8.7      21 Aug 2022 07:37  Mg     2.0     08-20    TPro  5.9<L>  /  Alb  3.6  /  TBili  <0.2  /  DBili  x   /  AST  12  /  ALT  12  /  AlkPhos  85  08-20        CARDIAC MARKERS ( 20 Aug 2022 20:36 )  x     / <0.01 ng/mL / x     / x     / x              RADIOLOGY:  -CXR:  < from: Xray Chest 1 View- PORTABLE-Urgent (08.20.22 @ 20:50) >  Impression:  There are bilateral opacities. No evidence of pneumothorax.            --- End of Report ---    < end of copied text >          < from: CT Head No Cont (08.20.22 @ 20:09) >    IMPRESSION:    CT HEAD:  No acute intracranial findings.    CT CERVICAL SPINE:    Redemonstration of type II dens fracture with increased posterior   angulation of the superior fracture fragment as well as approximately 5   mm posterior displacement since the previous exam. Recommend follow-up   MRI cervical spine.    Redemonstration of multiple fractures of C1 vertebral body with decreased   displacement of fracture fragments as described above.      Callback attempted at 8:58 PM 8/20/2022.          --- End of Report ---    < end of copied text >      ECG:  < from: 12 Lead ECG (08.21.22 @ 08:08) >  Sinus rhythm  NormalECG      Confirmed by LILIANA CISNEROS MD ([id number]) on 8/21/2022 8:55:38 AM    < end of copied text >       HPI:  89 years old female history of hypertensions, high cholesterol, aortic aneurysm BIBA from home status post syncopal episode during dinner this evening.  As per son, patient has been having unexplained syncopal episodes since February of this year.  Patient was admitted in February and July for syncope with traumatic injury.  Patient was just discharged from [facility] earlier today after rehab.  Patient was eating dinner this evening and suddenly passed out again on the table.  Family noted before her apple so and was able to catch patient and will patient is onto the table.  Patient woke up couple minutes later and had another episode of near syncope.  Family laid patient on the floor for 30 minutes and called the EMS.  Patient appeared to be confused after she woke up and having rapid breathing at the time.  Patient otherwise denies symptoms in ED.  Does not recall the episode.  Denies headache, worsening neck pain, lower back pain, extremity pains, chest pain and abdominal pain (20 Aug 2022 23:05)        HPI-Cardiology   Pt with above Hx, evaluated at bedside. Currently admitted in telemetry for eval of syncope. PT appears comfortable sitting in the chair and is AOx2. Denies having syncopal episodes ore recalling the events and states that her family have been telling her she has been passing out. Denies dizziness/lightheadedness, palpitations, CP or SOB. Radiology tests and hospital records, were reviewed, as well as previous notes on this patient.         PAST MEDICAL & SURGICAL HISTORY  Bronchiectasis    Aneurysm of aorta  Thoracic    HTN (hypertension)    2019 novel coronavirus disease (COVID-19)    No significant past surgical history  Excisions of skin cancer on left arm          ALLERGIES:  penicillin (Eye Irritation)  sulfa drugs (Eye Irritation)      MEDICATIONS:  MEDICATIONS  (STANDING):  cholecalciferol 2000 Unit(s) Oral every 24 hours  enoxaparin Injectable 30 milliGRAM(s) SubCutaneous every 24 hours  famotidine    Tablet 20 milliGRAM(s) Oral daily  losartan 50 milliGRAM(s) Oral daily  montelukast 10 milliGRAM(s) Oral every 24 hours    MEDICATIONS  (PRN):  acetaminophen     Tablet .. 650 milliGRAM(s) Oral every 6 hours PRN Temp greater or equal to 38C (100.4F), Mild Pain (1 - 3)  aluminum hydroxide/magnesium hydroxide/simethicone Suspension 30 milliLiter(s) Oral every 4 hours PRN Dyspepsia  melatonin 5 milliGRAM(s) Oral at bedtime PRN Insomnia  ondansetron Injectable 4 milliGRAM(s) IV Push every 8 hours PRN Nausea and/or Vomiting      HOME MEDICATIONS:  Home Medications:  acetaminophen 325 mg oral tablet: 2 tab(s) orally every 6 hours, As needed, for pain or fever (01 Aug 2022 14:17)  alendronate 10 mg oral tablet: 1 tab(s) orally once a day at 6AM--take in upright position with 8 ounces of water and remain upright x 60 minutes after taking it; no food or other meds for 60 minutes after taking it (01 Aug 2022 14:17)  heparin: 5000 unit(s) subcutaneous every 8 hours (01 Aug 2022 14:17)  losartan 50 mg oral tablet: 1 tab(s) orally once a day -- PLEASE HOLD FOR SBP less than 120 (01 Aug 2022 14:17)  melatonin 3 mg oral tablet: 1 tab(s) orally once a day (at bedtime), As needed, Insomnia (01 Aug 2022 14:17)  senna leaf extract oral tablet: 2 tab(s) orally once a day (at bedtime), As needed, Constipation (01 Aug 2022 14:17)      VITALS:   T(F): 97.5 (08-22 @ 14:26), Max: 98.4 (08-21 @ 21:03)  HR: 89 (08-22 @ 14:26) (78 - 101)  BP: 103/60 (08-22 @ 14:26) (103/60 - 171/87)  BP(mean): --  RR: 18 (08-22 @ 14:26) (18 - 19)  SpO2: 98% (08-22 @ 05:38) (94% - 98%)          REVIEW OF SYSTEMS:  See HPI        PHYSICAL EXAM:  NEURO: patient is awake , A&O x2  GEN: Not in acute distress  NECK: no thyroid enlargement, no JVD  LUNGS: decreased at bases b/l   CARDIOVASCULAR: S1/S2 present, RRR , no murmurs or rubs, no carotid bruits,  + PP bilaterally  ABD: Soft, non-tender, non-distended, +BS  EXT: No CHUY  SKIN: Intact        LABS:                        13.2   6.12  )-----------( 293      ( 21 Aug 2022 07:37 )             40.3     08-21    138  |  102  |  16  ----------------------------<  87  4.3   |  27  |  0.5<L>    Ca    8.7      21 Aug 2022 07:37  Mg     2.0     08-20    TPro  5.9<L>  /  Alb  3.6  /  TBili  <0.2  /  DBili  x   /  AST  12  /  ALT  12  /  AlkPhos  85  08-20        CARDIAC MARKERS ( 20 Aug 2022 20:36 )  x     / <0.01 ng/mL / x     / x     / x              RADIOLOGY:  -CXR:  < from: Xray Chest 1 View- PORTABLE-Urgent (08.20.22 @ 20:50) >  Impression:  There are bilateral opacities. No evidence of pneumothorax.            --- End of Report ---    < end of copied text >          < from: CT Head No Cont (08.20.22 @ 20:09) >    IMPRESSION:    CT HEAD:  No acute intracranial findings.    CT CERVICAL SPINE:    Redemonstration of type II dens fracture with increased posterior   angulation of the superior fracture fragment as well as approximately 5   mm posterior displacement since the previous exam. Recommend follow-up   MRI cervical spine.    Redemonstration of multiple fractures of C1 vertebral body with decreased   displacement of fracture fragments as described above.      Callback attempted at 8:58 PM 8/20/2022.          --- End of Report ---    < end of copied text >      ECG:  < from: 12 Lead ECG (08.21.22 @ 08:08) >  Sinus rhythm  NormalECG      Confirmed by LILIANA CISNEROS MD ([id number]) on 8/21/2022 8:55:38 AM    < end of copied text >

## 2022-08-22 NOTE — PHYSICAL THERAPY INITIAL EVALUATION ADULT - GENERAL OBSERVATIONS, REHAB EVAL
11:30-11:55 Chart reviewed. Pt encountered sitting at edge of bed with RN, may be seen by Physical Therapist as confirmed with Nurse. Patient denied pain at rest and ready to get up now; +tele; +heplock; +cervical collar

## 2022-08-22 NOTE — CONSULT NOTE ADULT - NS ATTEND AMEND GEN_ALL_CORE FT
89 y/o female history of hypertensions, high cholesterol, aortic aneurysm BIBA from home status post syncopal episode during dinner. Patient is poor historian. She does not know why in hospital . Agree can can consider 30 day out patient [facility]. Recheck ortho bp. If orthostatic will consder Midodrine. Prognosis guarded
I have personally seen and examined this patient.  I have fully participated in the care of this patient.  I have reviewed all pertinent clinical information, including history, physical exam, plan and note. Patient is 89 year old with with recurrent fall and syncopal episodes. Described as sudden LOC, lasting for few minute. Some confusing afterwards. Patient remained on the floor for 30 minutes per report. Will r/o seizure. REEG. Will consider VEEG if needed. Cardiac work up for syncope. This episodes reported I have reviewed all pertinent clinical information and reviewed all relevant imaging and diagnostic studies personally.  Recommendations as above.  Agree with above assessment except as noted.

## 2022-08-22 NOTE — PHYSICAL THERAPY INITIAL EVALUATION ADULT - ADDITIONAL COMMENTS
Per patient, she lives alone; has 2 steps with (L) rail going up to enter home. no steps inside home; has a rolling walker and cane at home; just recently discharged from [facility]

## 2022-08-22 NOTE — CONSULT NOTE ADULT - ASSESSMENT
A  89 y/o female history of hypertensions, high cholesterol, aortic aneurysm BIBA from home status post syncopal episode during dinner. On exam , no tongue bite marking. Neck collar on place. CT head negative. C-spine redemonstration of multiple fractures of C1 vertebral body with decreased  displacement of fracture fragments. Orthostatic vitals positive, systolic drop from sitting 164 to 95 with standing. Pt with syncope , seizure less likely.     Plan :     -REEG  -ECHO   -cardiac work up for syncope      A  89 y/o female history of hypertensions, high cholesterol, aortic aneurysm BIBA from home status post syncopal episode during dinner. On exam , no tongue bite marking. Neck collar on place. CT head negative. C-spine redemonstration of multiple fractures of C1 vertebral body with decreased displacement of fracture fragments. Orthostatic vitals positive, systolic drop from sitting 164 to 95 with standing. Pt with syncope , seizure less likely but will rule out.       -REEG for now  -Will consider VEEG   -ECHO   -cardiac work up for syncope   - CT cervical spine is unchanged from prior CT spine in July 2022. Follow up with ortho

## 2022-08-23 PROCEDURE — 93010 ELECTROCARDIOGRAM REPORT: CPT

## 2022-08-23 PROCEDURE — 99233 SBSQ HOSP IP/OBS HIGH 50: CPT

## 2022-08-23 PROCEDURE — 95816 EEG AWAKE AND DROWSY: CPT | Mod: 26

## 2022-08-23 PROCEDURE — 93306 TTE W/DOPPLER COMPLETE: CPT | Mod: 26

## 2022-08-23 RX ORDER — SODIUM CHLORIDE 9 MG/ML
1000 INJECTION INTRAMUSCULAR; INTRAVENOUS; SUBCUTANEOUS
Refills: 0 | Status: DISCONTINUED | OUTPATIENT
Start: 2022-08-23 | End: 2022-08-24

## 2022-08-23 RX ORDER — METOPROLOL TARTRATE 50 MG
12.5 TABLET ORAL EVERY 12 HOURS
Refills: 0 | Status: DISCONTINUED | OUTPATIENT
Start: 2022-08-23 | End: 2022-08-23

## 2022-08-23 RX ORDER — AMIODARONE HYDROCHLORIDE 400 MG/1
150 TABLET ORAL ONCE
Refills: 0 | Status: COMPLETED | OUTPATIENT
Start: 2022-08-23 | End: 2022-08-23

## 2022-08-23 RX ORDER — AMIODARONE HYDROCHLORIDE 400 MG/1
0.5 TABLET ORAL
Qty: 900 | Refills: 0 | Status: DISCONTINUED | OUTPATIENT
Start: 2022-08-23 | End: 2022-08-24

## 2022-08-23 RX ORDER — AMIODARONE HYDROCHLORIDE 400 MG/1
1 TABLET ORAL
Qty: 900 | Refills: 0 | Status: DISCONTINUED | OUTPATIENT
Start: 2022-08-23 | End: 2022-08-24

## 2022-08-23 RX ADMIN — AMIODARONE HYDROCHLORIDE 600 MILLIGRAM(S): 400 TABLET ORAL at 12:13

## 2022-08-23 RX ADMIN — SODIUM CHLORIDE 75 MILLILITER(S): 9 INJECTION INTRAMUSCULAR; INTRAVENOUS; SUBCUTANEOUS at 12:13

## 2022-08-23 RX ADMIN — ENOXAPARIN SODIUM 30 MILLIGRAM(S): 100 INJECTION SUBCUTANEOUS at 19:00

## 2022-08-23 RX ADMIN — FAMOTIDINE 20 MILLIGRAM(S): 10 INJECTION INTRAVENOUS at 12:14

## 2022-08-23 RX ADMIN — Medication 2000 UNIT(S): at 12:14

## 2022-08-23 RX ADMIN — AMIODARONE HYDROCHLORIDE 33.3 MG/MIN: 400 TABLET ORAL at 12:55

## 2022-08-23 RX ADMIN — MONTELUKAST 10 MILLIGRAM(S): 4 TABLET, CHEWABLE ORAL at 05:19

## 2022-08-23 RX ADMIN — LOSARTAN POTASSIUM 50 MILLIGRAM(S): 100 TABLET, FILM COATED ORAL at 05:18

## 2022-08-23 NOTE — CHART NOTE - NSCHARTNOTEFT_GEN_A_CORE
patient with hr 140-160 on tele monitor  admitted for dizziness, r/o CVA  no hx of arrhythmia  ordered STAT EKG, will recall cardiology      Vital Signs Last 24 Hrs  T(C): 36.6 (23 Aug 2022 09:15), Max: 36.6 (23 Aug 2022 09:15)  T(F): 97.8 (23 Aug 2022 09:15), Max: 97.8 (23 Aug 2022 09:15)  HR: 118 (23 Aug 2022 09:15) (82 - 118)  BP: 98/55 (23 Aug 2022 09:15) (98/55 - 114/63)  BP(mean): --  RR: 16 (23 Aug 2022 09:15) (16 - 18)  SpO2: 95% (23 Aug 2022 09:15) (95% - 98%)    Parameters below as of 23 Aug 2022 09:15  Patient On (Oxygen Delivery Method): room air patient with hr 140-160 on tele monitor  admitted for dizziness  no hx of arrhythmia  ordered STAT EKG, will recall cardiology      Vital Signs Last 24 Hrs  T(C): 36.6 (23 Aug 2022 09:15), Max: 36.6 (23 Aug 2022 09:15)  T(F): 97.8 (23 Aug 2022 09:15), Max: 97.8 (23 Aug 2022 09:15)  HR: 118 (23 Aug 2022 09:15) (82 - 118)  BP: 98/55 (23 Aug 2022 09:15) (98/55 - 114/63)  BP(mean): --  RR: 16 (23 Aug 2022 09:15) (16 - 18)  SpO2: 95% (23 Aug 2022 09:15) (95% - 98%)    Parameters below as of 23 Aug 2022 09:15  Patient On (Oxygen Delivery Method): room air

## 2022-08-23 NOTE — PROGRESS NOTE ADULT - SUBJECTIVE AND OBJECTIVE BOX
Patient is a 89y old  Female who presents with a chief complaint of Syncope     SUBJECTIVE / OVERNIGHT EVENTS: None     MEDICATIONS  (STANDING):  aMIOdarone Infusion 1 mG/Min (33.3 mL/Hr) IV Continuous <Continuous>  aMIOdarone Infusion 0.5 mG/Min (16.7 mL/Hr) IV Continuous <Continuous>  cholecalciferol 2000 Unit(s) Oral every 24 hours  enoxaparin Injectable 30 milliGRAM(s) SubCutaneous every 24 hours  famotidine    Tablet 20 milliGRAM(s) Oral daily  losartan 50 milliGRAM(s) Oral daily  montelukast 10 milliGRAM(s) Oral every 24 hours  sodium chloride 0.9%. 1000 milliLiter(s) (75 mL/Hr) IV Continuous <Continuous>    MEDICATIONS  (PRN):  acetaminophen     Tablet .. 650 milliGRAM(s) Oral every 6 hours PRN Temp greater or equal to 38C (100.4F), Mild Pain (1 - 3)  aluminum hydroxide/magnesium hydroxide/simethicone Suspension 30 milliLiter(s) Oral every 4 hours PRN Dyspepsia  melatonin 5 milliGRAM(s) Oral at bedtime PRN Insomnia  ondansetron Injectable 4 milliGRAM(s) IV Push every 8 hours PRN Nausea and/or Vomiting      PHYSICAL EXAM:  Vital Signs Last 24 Hrs  T(C): 37.2 (23 Aug 2022 13:45), Max: 37.2 (23 Aug 2022 13:45)  T(F): 99 (23 Aug 2022 13:45), Max: 99 (23 Aug 2022 13:45)  HR: 87 (23 Aug 2022 13:45) (82 - 118)  BP: 84/51 (23 Aug 2022 13:45) (84/51 - 114/63)  BP(mean): --  RR: 16 (23 Aug 2022 13:45) (16 - 18)  SpO2: 95% (23 Aug 2022 09:15) (95% - 98%)    Parameters below as of 23 Aug 2022 09:15  Patient On (Oxygen Delivery Method): room air    GENERAL: No acute distress, well-developed  HEAD:  Atraumatic, Normocephalic  EYES: EOMI, PERRLA, conjunctiva and sclera clear  NECK: Neck collar in place   CHEST/LUNG: CTAB; No wheezes, rales, or rhonchi  HEART: Irregularly  irregular,  No murmurs, rubs, or gallops  ABDOMEN: Soft, non-tender, non-distended; normal bowel sounds  EXTREMITIES:  2+ peripheral pulses b/l, No clubbing, cyanosis, or edema  NEUROLOGY: A&O x 3, no focal deficits    LABS:    RADIOLOGY & ADDITIONAL TESTS:  < from: 12 Lead ECG (08.23.22 @ 09:36) >    Diagnosis Line Atrial fibrillation with rapid ventricular response  Nonspecific ST abnormality  Abnormal QRS-T angle, consider primary T wave abnormality  Poor R wave progression  Abnormal ECG    Confirmed by TERRA GRECO MD [id number]) on 8/23/2022 9:41:24 AM    < from: TTE Echo Complete w/o Contrast w/ Doppler (08.23.22 @ 10:13) >  Summary:   1. Patient is in atrial fibrillation with RVR during study.   2. Normal global left ventricular systolic function.   3. LV Ejection Fraction by Mcdonald's Method with a biplane EF of 65 %.   4. The left ventricular diastolic function could not be assessed in this   study, due to presence of atrial fibrillation.   5. Normal left atrial size.   6. Normal right atrial size.   7. Sclerotic aortic valve with normal opening.   8. Moderate thickening of the anterior and posterior mitral valve   leaflets.   9. Trace mitral valve regurgitation.  10. Mild tricuspid regurgitation.  11. Moderate pulmonic valve regurgitation.  12. Dilatation of the ascending aorta, measures 3.7 cm.  13. Normal pulmonary artery pressure.  14. Small pericardial effusion, localized to right atrium in subcostal   view. No evidence of tamponade.    < end of copied text >

## 2022-08-23 NOTE — PROGRESS NOTE ADULT - SUBJECTIVE AND OBJECTIVE BOX
MICHOACANOCHAZ MATOS  89y, Female  Allergy: penicillin (Eye Irritation)  sulfa drugs (Eye Irritation)      LOS  3d    CHIEF COMPLAINT: Syncope (22 Aug 2022 20:36)      INTERVAL EVENTS/HPI  - No acute events overnight  - T(F): , Max: 97.8 (08-23-22 @ 09:15)  - remains on room air, tachycardic   - WBC Count: 6.12 (08-21-22 @ 07:37)  WBC Count: 8.46 (08-20-22 @ 20:36)     -      ROS  General: Denies rigors, nightsweats  HEENT: Denies headache, rhinorrhea, sore throat, eye pain  CV: Denies CP, palpitations  PULM: Denies wheezing, hemoptysis  GI: Denies hematemesis, hematochezia, melena  : Denies discharge, hematuria  MSK: Denies arthralgias, myalgias  SKIN: Denies rash, lesions  NEURO: Denies paresthesias, weakness  PSYCH: Denies depression, anxiety    VITALS:  T(F): 97.8, Max: 97.8 (08-23-22 @ 09:15)  HR: 118  BP: 98/55  RR: 16Vital Signs Last 24 Hrs  T(C): 36.6 (23 Aug 2022 09:15), Max: 36.6 (23 Aug 2022 09:15)  T(F): 97.8 (23 Aug 2022 09:15), Max: 97.8 (23 Aug 2022 09:15)  HR: 118 (23 Aug 2022 09:15) (82 - 118)  BP: 98/55 (23 Aug 2022 09:15) (98/55 - 114/63)  BP(mean): --  RR: 16 (23 Aug 2022 09:15) (16 - 18)  SpO2: 95% (23 Aug 2022 09:15) (95% - 98%)    Parameters below as of 23 Aug 2022 09:15  Patient On (Oxygen Delivery Method): room air        PHYSICAL EXAM:  Gen: NAD, resting in bed  HEENT: Normocephalic, atraumatic  Neck: supple, no lymphadenopathy  CV: Regular rate & regular rhythm  Lungs: decreased BS at bases, no fremitus  Abdomen: Soft, BS present  Ext: Warm, well perfused  Neuro: non focal, awake  Skin: no rash, no erythema  Lines: no phlebitis    FH: Non-contributory  Social Hx: Non-contributory    TESTS & MEASUREMENTS:                        INFECTIOUS DISEASES TESTING  COVID-19 PCR: Detected (08-20-22 @ 20:50)  COVID-19 PCR: NotDetec (08-01-22 @ 14:52)  COVID-19 PCR: NotDetec (07-29-22 @ 06:23)  COVID-19 PCR: NotDetec (07-20-22 @ 14:02)  COVID-19 PCR: NotDetec (02-19-22 @ 15:45)  COVID-19 PCR: NotDetec (02-17-22 @ 19:25)  COVID-19 PCR: NotDetec (02-15-22 @ 15:42)      INFLAMMATORY MARKERS      RADIOLOGY & ADDITIONAL TESTS:  I have personally reviewed the last available Chest xray  CXR  Xray Chest 1 View- PORTABLE-Urgent:   ACC: [id number] EXAM:  XR CHEST PORTABLE URGENT 1V                          PROCEDURE DATE:  08/20/2022          INTERPRETATION:  Clinical History / Reason for exam: Chest Pain    Comparison : Chest radiograph: 7/22/2022    Technique/Positioning: Frontal view of the chest    Findings:    Support devices: None.    Cardiac/mediastinum/hilum: No significant change    Lung parenchyma/Pleura: There are bilateral opacities. No evidence of   pneumothorax.    Skeleton/soft tissues: No significant change.    Impression:  There are bilateral opacities. No evidence of pneumothorax.            --- End of Report ---            SANDHYA ORTIZ MD; Attending Radiologist  This document has been electronically signed. Aug 21 2022 11:30AM (08-20-22 @ 20:50)      CT      CARDIOLOGY TESTING  12 Lead ECG:   Ventricular Rate 123 BPM    Atrial Rate 340 BPM    QRS Duration 60 ms    Q-T Interval 302 ms    QTC Calculation(Bazett) 432 ms    R Axis -3 degrees    T Axis -86 degrees    Diagnosis Line Atrial fibrillation with rapid ventricular response  Nonspecific ST abnormality  Abnormal QRS-T angle, consider primary T wave abnormality  Poor R wave progression  Abnormal ECG    Confirmed by TERRA GRECO MD ([id number]) on 8/23/2022 9:41:24 AM (08-23-22 @ 09:36)  12 Lead ECG:   Ventricular Rate 72 BPM    Atrial Rate 72 BPM    P-R Interval 188 ms    QRS Duration 70 ms    Q-T Interval 358 ms    QTC Calculation(Bazett) 392 ms    P Axis 80 degrees    R Axis 4 degrees    T Axis 42 degrees    Diagnosis Line Sinus rhythm  NormalECG      Confirmed by LILIANA CISNEROS MD [id number]) on 8/21/2022 8:55:38 AM (08-21-22 @ 08:08)      MEDICATIONS  aMIOdarone Infusion 1 IV Continuous <Continuous>  aMIOdarone Infusion 0.5 IV Continuous <Continuous>  aMIOdarone IVPB 150 IV Intermittent once  cholecalciferol 2000 Oral every 24 hours  enoxaparin Injectable 30 SubCutaneous every 24 hours  famotidine    Tablet 20 Oral daily  losartan 50 Oral daily  montelukast 10 Oral every 24 hours  sodium chloride 0.9%. 1000 IV Continuous <Continuous>      WEIGHT  Weight (kg): 50.1 (08-20-22 @ 23:40)      ANTIBIOTICS:      All available historical records have been reviewed

## 2022-08-23 NOTE — PROGRESS NOTE ADULT - ASSESSMENT
Syncope, Recurrent Episodes   -Denies symptoms prior to episode Prior, but has Post-Ictal confusion   -Possible Seizures? given  Meningioma: Neuro consult appreciated  -REEG: was normal, hence neuro signed off   -Cardio Eval Appreciated : EKG : NSR, Troponin Negative, 2D ECHO: wnl   , Orthostatic Vitals Positive: Compression stockings and abdominal binder with adequate hydration   -, Carotid US cannot be done secondary to Type II dens fracture with Neck collar in place   -F/up  2D-Echo    New Onset AFIB with RVR , CHADS: 4 (8/23/22)   -Started on Amiodarone Infusion, monitor LFT's  -AC not a good idea given falls   -F/up TSH and AM EKG       Type II Dens Fracture on CT with Increased Posterior Angulation   -Neurosurgery Consult, recalled 8/23  -Already has neck collar in place **    Essential HTN: controlled   c/w Losartan     Covid-19: Asymptomatic  -Start Dexa and Remdesivir if Pulse-ox below 94%   -COVID Precautions     DVT PPX: Lovenox   GI PPX: H2 blockers   Full Code  Dispo: from home (recently discharged from NH)   -Pending completion Syncope w/up

## 2022-08-23 NOTE — PROGRESS NOTE ADULT - ASSESSMENT
Patient is a 89y old  Female with history of hypertensions, high cholesterol, aortic aneurysm, now BIBA from home status post syncopal episode during dinner this evening.  As per son, patient has been having unexplained syncopal episodes since February of this year.  Patient was admitted in February and July for syncope with traumatic injury.  Patient was just discharged from [facility] earlier after rehab.  Patient was eating dinner this evening and suddenly passed out again on the table.   Patient woke up couple minutes later and had another episode of near syncope.  Family laid patient on the floor for 30 minutes and called the EMS.  Patient appeared to be confused after she woke up and having rapid breathing at the time.  Patient otherwise denies symptoms in ED.  Does not recall the episode.  Denies headache, worsening neck pain. On admission, she found to have no fever, but tachycardia and positive COVID PCR. The CXR shows B/L Opacities but she is not hypoxic. The ID consult requested to assist with further management of COVID.    # Positive COVID  # S/p Syncopal episode  # A fib with RVR    Recommendations  - monitor O2 status -- can hold on RDV/dex unless SPO2 < 94%  - a fib management per cardiology/primary   - Supportive care for COVID including AC    Please call or message on Microsoft Teams if with any questions.  Spectra [phone]

## 2022-08-23 NOTE — CHART NOTE - NSCHARTNOTEFT_GEN_A_CORE
Called by Medicine PA for Pt with new onset AFib w/RVR.     Stat ECG: AFib with RVR  On telemonitor, tachyrhythmic with HR sustained 130's       Plan:  Start Amio gtt x24hrs, to try to convert to sinus. Give loading dose first  Start low dose BB, 12.5mg PO BID as tolerated  Chads-Vasc2: 3 - at high risk for bleeding on AC given Hx of multiple recurrent falls    Cardiology will follow

## 2022-08-23 NOTE — CHART NOTE - NSCHARTNOTEFT_GEN_A_CORE
Pt with fall and C2 fracture seen in July. Pt was to be placed in hard collar 24/7. f/u with Dr. Pulido 8/18. Pt missed follow up now s/p new syncopal episode (orthostatic). Per hospitalist pt with no pain, weakness in arms and legs. Does complain of neck pain. Back in c-collar. Repeat CT C spine shows brent posterior angulation of fracture fragment.    At 89 cervical fusion would be highly morbid in this patient. Cont C-collar for now. Should follow up with neurosurgery in office ASAP after discharge: [phone].    Please cont C-collar. Please call for any concerning changes in exam ext [phone]

## 2022-08-23 NOTE — CHART NOTE - NSCHARTNOTEFT_GEN_A_CORE
Neuro initially consulted for syncope vs seizure. Routine EEG normal. Pt w/ positive orthostatics. Syncope likely cardiogenic. NO further neurological workup. Recall PRN.       < from: EEG (08.22.22 @ 10:40) >    Impression:  Normal    Clinical Correlation & Recommendations  A normal EEG does not exclude the possibility of seizure disorder.   Clinical correlation is recommended.    < end of copied text >

## 2022-08-24 LAB
ALBUMIN SERPL ELPH-MCNC: 3.6 G/DL — SIGNIFICANT CHANGE UP (ref 3.5–5.2)
ALP SERPL-CCNC: 81 U/L — SIGNIFICANT CHANGE UP (ref 30–115)
ALT FLD-CCNC: 9 U/L — SIGNIFICANT CHANGE UP (ref 0–41)
ANION GAP SERPL CALC-SCNC: 10 MMOL/L — SIGNIFICANT CHANGE UP (ref 7–14)
AST SERPL-CCNC: 11 U/L — SIGNIFICANT CHANGE UP (ref 0–41)
BASOPHILS # BLD AUTO: 0.02 K/UL — SIGNIFICANT CHANGE UP (ref 0–0.2)
BASOPHILS NFR BLD AUTO: 0.2 % — SIGNIFICANT CHANGE UP (ref 0–1)
BILIRUB SERPL-MCNC: 0.4 MG/DL — SIGNIFICANT CHANGE UP (ref 0.2–1.2)
BUN SERPL-MCNC: 13 MG/DL — SIGNIFICANT CHANGE UP (ref 10–20)
CALCIUM SERPL-MCNC: 8.6 MG/DL — SIGNIFICANT CHANGE UP (ref 8.5–10.1)
CHLORIDE SERPL-SCNC: 103 MMOL/L — SIGNIFICANT CHANGE UP (ref 98–110)
CO2 SERPL-SCNC: 26 MMOL/L — SIGNIFICANT CHANGE UP (ref 17–32)
CREAT SERPL-MCNC: 0.6 MG/DL — LOW (ref 0.7–1.5)
EGFR: 84 ML/MIN/1.73M2 — SIGNIFICANT CHANGE UP
EOSINOPHIL # BLD AUTO: 0.13 K/UL — SIGNIFICANT CHANGE UP (ref 0–0.7)
EOSINOPHIL NFR BLD AUTO: 1.5 % — SIGNIFICANT CHANGE UP (ref 0–8)
GLUCOSE SERPL-MCNC: 92 MG/DL — SIGNIFICANT CHANGE UP (ref 70–99)
HCT VFR BLD CALC: 43.2 % — SIGNIFICANT CHANGE UP (ref 37–47)
HGB BLD-MCNC: 14.1 G/DL — SIGNIFICANT CHANGE UP (ref 12–16)
IMM GRANULOCYTES NFR BLD AUTO: 0.4 % — HIGH (ref 0.1–0.3)
LYMPHOCYTES # BLD AUTO: 1.48 K/UL — SIGNIFICANT CHANGE UP (ref 1.2–3.4)
LYMPHOCYTES # BLD AUTO: 17.3 % — LOW (ref 20.5–51.1)
MAGNESIUM SERPL-MCNC: 1.9 MG/DL — SIGNIFICANT CHANGE UP (ref 1.8–2.4)
MCHC RBC-ENTMCNC: 29.9 PG — SIGNIFICANT CHANGE UP (ref 27–31)
MCHC RBC-ENTMCNC: 32.6 G/DL — SIGNIFICANT CHANGE UP (ref 32–37)
MCV RBC AUTO: 91.7 FL — SIGNIFICANT CHANGE UP (ref 81–99)
MONOCYTES # BLD AUTO: 1.03 K/UL — HIGH (ref 0.1–0.6)
MONOCYTES NFR BLD AUTO: 12 % — HIGH (ref 1.7–9.3)
NEUTROPHILS # BLD AUTO: 5.88 K/UL — SIGNIFICANT CHANGE UP (ref 1.4–6.5)
NEUTROPHILS NFR BLD AUTO: 68.6 % — SIGNIFICANT CHANGE UP (ref 42.2–75.2)
NRBC # BLD: 0 /100 WBCS — SIGNIFICANT CHANGE UP (ref 0–0)
PLATELET # BLD AUTO: 280 K/UL — SIGNIFICANT CHANGE UP (ref 130–400)
POTASSIUM SERPL-MCNC: 4.2 MMOL/L — SIGNIFICANT CHANGE UP (ref 3.5–5)
POTASSIUM SERPL-SCNC: 4.2 MMOL/L — SIGNIFICANT CHANGE UP (ref 3.5–5)
PROT SERPL-MCNC: 5.9 G/DL — LOW (ref 6–8)
RBC # BLD: 4.71 M/UL — SIGNIFICANT CHANGE UP (ref 4.2–5.4)
RBC # FLD: 13.8 % — SIGNIFICANT CHANGE UP (ref 11.5–14.5)
SODIUM SERPL-SCNC: 139 MMOL/L — SIGNIFICANT CHANGE UP (ref 135–146)
TSH SERPL-MCNC: 1.91 UIU/ML — SIGNIFICANT CHANGE UP (ref 0.27–4.2)
WBC # BLD: 8.57 K/UL — SIGNIFICANT CHANGE UP (ref 4.8–10.8)
WBC # FLD AUTO: 8.57 K/UL — SIGNIFICANT CHANGE UP (ref 4.8–10.8)

## 2022-08-24 PROCEDURE — 99232 SBSQ HOSP IP/OBS MODERATE 35: CPT

## 2022-08-24 RX ORDER — AMIODARONE HYDROCHLORIDE 400 MG/1
200 TABLET ORAL DAILY
Refills: 0 | Status: DISCONTINUED | OUTPATIENT
Start: 2022-08-24 | End: 2022-08-26

## 2022-08-24 RX ORDER — TRAMADOL HYDROCHLORIDE 50 MG/1
25 TABLET ORAL EVERY 6 HOURS
Refills: 0 | Status: DISCONTINUED | OUTPATIENT
Start: 2022-08-24 | End: 2022-08-26

## 2022-08-24 RX ADMIN — FAMOTIDINE 20 MILLIGRAM(S): 10 INJECTION INTRAVENOUS at 12:40

## 2022-08-24 RX ADMIN — ENOXAPARIN SODIUM 30 MILLIGRAM(S): 100 INJECTION SUBCUTANEOUS at 20:00

## 2022-08-24 RX ADMIN — TRAMADOL HYDROCHLORIDE 25 MILLIGRAM(S): 50 TABLET ORAL at 12:40

## 2022-08-24 RX ADMIN — Medication 2000 UNIT(S): at 12:40

## 2022-08-24 RX ADMIN — MONTELUKAST 10 MILLIGRAM(S): 4 TABLET, CHEWABLE ORAL at 06:11

## 2022-08-24 RX ADMIN — LOSARTAN POTASSIUM 50 MILLIGRAM(S): 100 TABLET, FILM COATED ORAL at 06:11

## 2022-08-24 NOTE — PROGRESS NOTE ADULT - SUBJECTIVE AND OBJECTIVE BOX
CC Syncope   HPI.  Patient reports that she feels better  Offers no complaints               Constitutional: No fever, fatigue or weight loss.  Skin: No rash.  Eyes: No recent vision problems or eye pain.  ENT: No congestion, ear pain, or sore throat.  Endocrine: No thyroid problems.  Cardiovascular: No chest pain or palpation.  Respiratory: No cough, shortness of breath, congestion, or wheezing.  Gastrointestinal: No abdominal pain, nausea, vomiting, or diarrhea.  Genitourinary: No dysuria.  Musculoskeletal: No joint swelling.  Neurologic: No headache.      Vital Signs Last 24 Hrs  T(C): 36 (08-24-22 @ 04:49), Max: 36.6 (08-23-22 @ 21:05)  T(F): 96.8 (08-24-22 @ 04:49), Max: 97.9 (08-23-22 @ 21:05)  HR: 75 (08-24-22 @ 04:49) (68 - 81)  BP: 132/71 (08-24-22 @ 04:49) (99/54 - 132/71)  BP(mean): --  RR: 18 (08-24-22 @ 04:49) (16 - 18)  SpO2: 94% (08-24-22 @ 06:20) (94% - 96%)        PHYSICAL EXAM-  GENERAL: NAD   HEAD:  Atraumatic, Normocephalic  EYES: EOMI, PERRLA, conjunctiva and sclera clear  NECK: Supple, No JVD, Normal thyroid  NERVOUS SYSTEM:  Alert & Oriented X3, Moving all extremities  CHEST/LUNG: Clear to percussion bilaterally; No rales, rhonchi, wheezing, or rubs  HEART: Regular rate and rhythm; No murmurs, rubs, or gallops  ABDOMEN: Soft, Nontender, Nondistended; Bowel sounds present  EXTREMITIES:  , No clubbing, cyanosis, or edema  SKIN: No rashes or lesions                                  14.1   8.57  )-----------( 280      ( 24 Aug 2022 07:35 )             43.2     08-24    139  |  103  |  13  ----------------------------<  92  4.2   |  26  |  0.6<L>    Ca    8.6      24 Aug 2022 07:35  Mg     1.9     08-24    TPro  5.9<L>  /  Alb  3.6  /  TBili  0.4  /  DBili  x   /  AST  11  /  ALT  9   /  AlkPhos  81  08-24                    MEDICATIONS  (STANDING):  aMIOdarone Infusion 1 mG/Min (33.3 mL/Hr) IV Continuous <Continuous>  aMIOdarone Infusion 0.5 mG/Min (16.7 mL/Hr) IV Continuous <Continuous>  cholecalciferol 2000 Unit(s) Oral every 24 hours  enoxaparin Injectable 30 milliGRAM(s) SubCutaneous every 24 hours  famotidine    Tablet 20 milliGRAM(s) Oral daily  losartan 50 milliGRAM(s) Oral daily  montelukast 10 milliGRAM(s) Oral every 24 hours  sodium chloride 0.9%. 1000 milliLiter(s) (75 mL/Hr) IV Continuous <Continuous>    MEDICATIONS  (PRN):  acetaminophen     Tablet .. 650 milliGRAM(s) Oral every 6 hours PRN Temp greater or equal to 38C (100.4F), Mild Pain (1 - 3)  aluminum hydroxide/magnesium hydroxide/simethicone Suspension 30 milliLiter(s) Oral every 4 hours PRN Dyspepsia  melatonin 5 milliGRAM(s) Oral at bedtime PRN Insomnia  ondansetron Injectable 4 milliGRAM(s) IV Push every 8 hours PRN Nausea and/or Vomiting  traMADol 25 milliGRAM(s) Oral every 6 hours PRN Severe Pain (7 - 10)        Imaging Personally Reviewed:     [x ] YES  [ ] NO    Consultant(s) Notes Reviewed:  [x ] YES  [ ] NO    Care Discussed with Consultants/Other Providers [x ] YES  [ ] NO

## 2022-08-24 NOTE — PROGRESS NOTE ADULT - ASSESSMENT
89 y/o female history of hypertensions, high cholesterol, aortic aneurysm BIBA from home status post syncopal episode found to have new onset Afib rvr this admission was started on amiodarone gtt.     Impression  #  new onset Afib rvr converted  back in nsr s/p amio gtt  #Likely orthostatic syncope    -ECG: NS, non ischemic. No bradycardia, pauses, AV blocks or arrhthymias noted. No changes from baseline  -On telemonitor appears in NSR with HR 70-90's  - TTE normal LV systolic function EF 65%    Plan:  - IVF for positive orthostatic VS  - currently nsr on tele rate controlled 60s  - can switch amio gtt to po 200qd today   - Fall precautions   - C/w home meds  - Recheck ortho bp. If orthostatic Can consider Midodrine  - f/u with cardiology outpatient  - Prognosis guarded

## 2022-08-24 NOTE — SWALLOW BEDSIDE ASSESSMENT ADULT - SLP PERTINENT HISTORY OF CURRENT PROBLEM
89 years old female history of hypertensions, high cholesterol, aortic aneurysm BIBA from home status post syncopal episode during dinner this evening.  As per son, patient has been having unexplained syncopal episodes since February of this year.  Patient was admitted in February and July for syncope with traumatic injury.  Patient was just discharged from [facility] earlier today after rehab.  Patient was eating dinner this evening and suddenly passed out again on the table

## 2022-08-24 NOTE — PROGRESS NOTE ADULT - ASSESSMENT
Patient is a 89y old  Female with history of hypertensions, high cholesterol, aortic aneurysm, now BIBA from home status post syncopal episode during dinner this evening.  As per son, patient has been having unexplained syncopal episodes since February of this year.  Patient was admitted in February and July for syncope with traumatic injury.  Patient was just discharged from [facility] earlier after rehab.  Patient was eating dinner this evening and suddenly passed out again on the table.   Patient woke up couple minutes later and had another episode of near syncope.  Family laid patient on the floor for 30 minutes and called the EMS.  Patient appeared to be confused after she woke up and having rapid breathing at the time.  Patient otherwise denies symptoms in ED.  Does not recall the episode.  Denies headache, worsening neck pain. On admission, she found to have no fever, but tachycardia and positive COVID PCR. The CXR shows B/L Opacities but she is not hypoxic. The ID consult requested to assist with further management of COVID.    # Positive COVID  # S/p Syncopal episode    would recommend:    1. OOB to chair   2. Supportive care for COVID including AC, monitor oxygen saturation closely  3. Management of Syncopal episode as per Primary/ Cardiology and Neurology team  4. COVID precautions    Attending Attestation:    Spent more than 35 minutes on total encounter, more than 50 % of the visit was spent counseling and/or coordinating care by the Attending physician.

## 2022-08-24 NOTE — PROGRESS NOTE ADULT - SUBJECTIVE AND OBJECTIVE BOX
Subjective/Objective  89y/o f with pmh htn, hld, aortic aneurysm admitted in telemetry for syncope found to have new onset Afib rvr was started on amiodarone gtt.     Pt seen and examined this am. Complaining of neck pain. Denies dizziness/lightheadedness, palpitations, CP or SOB.       MEDICATIONS  (STANDING):  aMIOdarone Infusion 1 mG/Min (33.3 mL/Hr) IV Continuous <Continuous>  aMIOdarone Infusion 0.5 mG/Min (16.7 mL/Hr) IV Continuous <Continuous>  cholecalciferol 2000 Unit(s) Oral every 24 hours  enoxaparin Injectable 30 milliGRAM(s) SubCutaneous every 24 hours  famotidine    Tablet 20 milliGRAM(s) Oral daily  losartan 50 milliGRAM(s) Oral daily  montelukast 10 milliGRAM(s) Oral every 24 hours  sodium chloride 0.9%. 1000 milliLiter(s) (75 mL/Hr) IV Continuous <Continuous>    MEDICATIONS  (PRN):  acetaminophen     Tablet .. 650 milliGRAM(s) Oral every 6 hours PRN Temp greater or equal to 38C (100.4F), Mild Pain (1 - 3)  aluminum hydroxide/magnesium hydroxide/simethicone Suspension 30 milliLiter(s) Oral every 4 hours PRN Dyspepsia  melatonin 5 milliGRAM(s) Oral at bedtime PRN Insomnia  ondansetron Injectable 4 milliGRAM(s) IV Push every 8 hours PRN Nausea and/or Vomiting  traMADol 25 milliGRAM(s) Oral every 6 hours PRN Severe Pain (7 - 10)          Vital Signs Last 24 Hrs  T(C): 36 (24 Aug 2022 04:49), Max: 37.2 (23 Aug 2022 13:45)  T(F): 96.8 (24 Aug 2022 04:49), Max: 99 (23 Aug 2022 13:45)  HR: 75 (24 Aug 2022 04:49) (68 - 87)  BP: 132/71 (24 Aug 2022 04:49) (84/51 - 132/71)  BP(mean): --  RR: 18 (24 Aug 2022 04:49) (16 - 18)  SpO2: 94% (24 Aug 2022 06:20) (94% - 96%)    Parameters below as of 24 Aug 2022 06:20  Patient On (Oxygen Delivery Method): room air      I&O's Detail    23 Aug 2022 07:01  -  24 Aug 2022 07:00  --------------------------------------------------------  IN:    Oral Fluid: 180 mL  Total IN: 180 mL    OUT:    Voided (mL): 500 mL  Total OUT: 500 mL    Total NET: -320 mL              GENERAL:  89y/o Female NAD, resting comfortably.  HEAD:  Atraumatic, Normocephalic  EYES: EOMI, PERRLA, conjunctiva and sclera clear  NECK: Supple, No JVD, no cervical lymphadenopathy, non-tender  CHEST/LUNG: Clear to auscultation bilaterally; No wheeze, rhonchi, or rales  HEART: Regular rate and rhythm; S1&S2  ABDOMEN: Soft, Nontender, Nondistended x 4 quadrants; Bowel sounds present  EXTREMITIES:   Peripheral Pulses Present, No clubbing, no cyanosis, or no edema, no calf tenderness  PSYCH: AAOx3, cooperative, appropriate  NEUROLOGY: WNL  SKIN: WNL        EKG/ TELEM:    LABS:                          14.1   8.57  )-----------( 280      ( 24 Aug 2022 07:35 )             43.2       24 Aug 2022 07:35    139    |  103    |  13     ----------------------------<  92     4.2     |  26     |  0.6<L>    Ca    8.6        24 Aug 2022 07:35  Mg     1.9       24 Aug 2022 07:35    TPro  5.9<L>  /  Alb  3.6    /  TBili  0.4    /  DBili  x      /  AST  11     /  ALT  9      /  AlkPhos  81     24 Aug 2022 07:35                      Diagnostic testing:  TTE  < from: TTE Echo Complete w/o Contrast w/ Doppler (08.23.22 @ 10:13) >  Summary:   1. Patient is in atrial fibrillation with RVR during study.   2. Normal global left ventricular systolic function.   3. LV Ejection Fraction by Mcdonald's Method with a biplane EF of 65 %.   4. The left ventricular diastolic function could not be assessed in this   study, due to presence of atrial fibrillation.   5. Normal left atrial size.   6. Normal right atrial size.   7. Sclerotic aortic valve with normal opening.   8. Moderate thickening of the anterior and posterior mitral valve   leaflets.   9. Trace mitral valve regurgitation.  10. Mild tricuspid regurgitation.  11. Moderate pulmonic valve regurgitation.  12. Dilatation of the ascending aorta, measures 3.7 cm.  13. Normal pulmonary artery pressure.  14. Small pericardial effusion, localized to right atrium in subcostal   view. No evidence of tamponade.    < end of copied text >        Assessment and Plan:

## 2022-08-24 NOTE — PROGRESS NOTE ADULT - ASSESSMENT
Syncope, Recurrent Episodes   -Denies symptoms prior to episode , but has Post-Ictal confusion   -Possible Seizures? given  Meningioma: Neuro consult appreciated  -REEG: was normal, hence neuro signed off   -Cardio Eval Appreciated : EKG : NSR, Troponin Negative, 2D ECHO: wnl   , Orthostatic Vitals Positive: Compression stockings and abdominal binder with adequate hydration   -, Carotid US cannot be done secondary to Type II dens fracture with Neck collar in place   -TTE noticed     New Onset AFIB with RVR , CHADS: 4 (8/23/22)   -continue with Amiodarone Infusion, monitor LFT's  -AC not a good idea given falls   -TSH level ok        Type II Dens Fracture on CT with Increased Posterior Angulation   -Neurosurgery Consult, recalled 8/23 no further intervention   -Already has neck collar in place **      Essential HTN: controlled   c/w Losartan     Covid-19: Asymptomatic  -Start Dexa and Remdesivir if Pulse-ox below 94%   -COVID Precautions     DVT PPX: Lovenox   GI PPX: H2 blockers   Full Code  Dispo: from home (recently discharged from NH)   -pending replacement

## 2022-08-24 NOTE — PROGRESS NOTE ADULT - SUBJECTIVE AND OBJECTIVE BOX
Patient is seen and examined at the bed side, is afebrile. She remains in Room air.       REVIEW OF SYSTEMS: All other review systems are negative        ALLERGIES: penicillin (Eye Irritation)  sulfa drugs (Eye Irritation)      Vital Signs Last 24 Hrs  T(C): 36 (24 Aug 2022 04:49), Max: 36.6 (23 Aug 2022 21:05)  T(F): 96.8 (24 Aug 2022 04:49), Max: 97.9 (23 Aug 2022 21:05)  HR: 75 (24 Aug 2022 04:49) (68 - 75)  BP: 132/71 (24 Aug 2022 04:49) (130/66 - 132/71)  BP(mean): --  RR: 18 (24 Aug 2022 04:49) (16 - 18)  SpO2: 94% (24 Aug 2022 06:20) (94% - 96%)    Parameters below as of 24 Aug 2022 06:20  Patient On (Oxygen Delivery Method): room air        PHYSICAL EXAM:  GENERAL: Not in distress   HEENT: C-collar in -placed  CHEST/LUNG:  Not using accessory muscles  ABDOMEN:  Nondistended  EXTREMITIES: No pedal  edema  CNS: Awake and Alert      LABS:                              14.1   8.57  )-----------( 280      ( 24 Aug 2022 07:35 )             43.2                               13.2   6.12  )-----------( 293      ( 21 Aug 2022 07:37 )             40.3       08-24    139  |  103  |  13  ----------------------------<  92  4.2   |  26  |  0.6<L>    Ca    8.6      24 Aug 2022 07:35  Mg     1.9     08-24    TPro  5.9<L>  /  Alb  3.6  /  TBili  0.4  /  DBili  x   /  AST  11  /  ALT  9   /  AlkPhos  81  08-24 08-21    138  |  102  |  16  ----------------------------<  87  4.3   |  27  |  0.5<L>    Ca    8.7      21 Aug 2022 07:37  Mg     2.0     08-20    TPro  5.9<L>  /  Alb  3.6  /  TBili  <0.2  /  DBili  x   /  AST  12  /  ALT  12  /  AlkPhos  85  08-20          MEDICATIONS  (STANDING):    aMIOdarone    Tablet 200 milliGRAM(s) Oral daily  cholecalciferol 2000 Unit(s) Oral every 24 hours  enoxaparin Injectable 30 milliGRAM(s) SubCutaneous every 24 hours  famotidine    Tablet 20 milliGRAM(s) Oral daily  losartan 50 milliGRAM(s) Oral daily  montelukast 10 milliGRAM(s) Oral every 24 hours        RADIOLOGY & ADDITIONAL TESTS:    < from: Xray Pelvis AP only (08.20.22 @ 20:50) >  No evidence of acute fracture.      < from: Xray Chest 1 View- PORTABLE-Urgent (08.20.22 @ 20:50) >  Impression:  There are bilateral opacities. No evidence of pneumothorax.        MICROBIOLOGY DATA:    COVID-19 PCR (08.20.22 @ 20:50)   COVID-19 PCR: Detected:

## 2022-08-24 NOTE — SWALLOW BEDSIDE ASSESSMENT ADULT - NS SPL SWALLOW CLINIC TRIAL FT
Pt oropharyngeal swallow function deemed GWFL. Toleration of least restrictive diet without overt s/s aspiration. Recommend bite-sized solids/thin liquids. Re-consult with any significant changes.

## 2022-08-25 LAB — SARS-COV-2 RNA SPEC QL NAA+PROBE: DETECTED

## 2022-08-25 PROCEDURE — 99232 SBSQ HOSP IP/OBS MODERATE 35: CPT

## 2022-08-25 RX ADMIN — AMIODARONE HYDROCHLORIDE 200 MILLIGRAM(S): 400 TABLET ORAL at 05:19

## 2022-08-25 RX ADMIN — ENOXAPARIN SODIUM 30 MILLIGRAM(S): 100 INJECTION SUBCUTANEOUS at 21:27

## 2022-08-25 RX ADMIN — Medication 2000 UNIT(S): at 11:14

## 2022-08-25 RX ADMIN — FAMOTIDINE 20 MILLIGRAM(S): 10 INJECTION INTRAVENOUS at 11:13

## 2022-08-25 RX ADMIN — TRAMADOL HYDROCHLORIDE 25 MILLIGRAM(S): 50 TABLET ORAL at 21:48

## 2022-08-25 RX ADMIN — MONTELUKAST 10 MILLIGRAM(S): 4 TABLET, CHEWABLE ORAL at 05:19

## 2022-08-25 RX ADMIN — TRAMADOL HYDROCHLORIDE 25 MILLIGRAM(S): 50 TABLET ORAL at 21:27

## 2022-08-25 RX ADMIN — LOSARTAN POTASSIUM 50 MILLIGRAM(S): 100 TABLET, FILM COATED ORAL at 05:19

## 2022-08-25 NOTE — PROGRESS NOTE ADULT - SUBJECTIVE AND OBJECTIVE BOX
CC Syncope   HPI.  Patient reports that she feels better  Offers no complaints               Constitutional: No fever, fatigue or weight loss.  Skin: No rash.  Eyes: No recent vision problems or eye pain.  ENT: No congestion, ear pain, or sore throat.  Endocrine: No thyroid problems.  Cardiovascular: No chest pain or palpation.  Respiratory: No cough, shortness of breath, congestion, or wheezing.  Gastrointestinal: No abdominal pain, nausea, vomiting, or diarrhea.  Genitourinary: No dysuria.  Musculoskeletal: No joint swelling.  Neurologic: No headache.    Vital Signs Last 24 Hrs  T(C): 37 (25 Aug 2022 14:15), Max: 37 (25 Aug 2022 14:15)  T(F): 98.6 (25 Aug 2022 14:15), Max: 98.6 (25 Aug 2022 14:15)  HR: 87 (25 Aug 2022 14:15) (66 - 87)  BP: 133/60 (25 Aug 2022 14:15) (133/60 - 158/74)  BP(mean): --  RR: 18 (25 Aug 2022 14:15) (18 - 18)  SpO2: 96% (25 Aug 2022 05:49) (94% - 96%)    Parameters below as of 25 Aug 2022 05:49  Patient On (Oxygen Delivery Method): room air            PHYSICAL EXAM-  GENERAL: NAD   HEAD:  Atraumatic, Normocephalic  EYES: EOMI, PERRLA, conjunctiva and sclera clear  NECK: Supple, No JVD, Normal thyroid  NERVOUS SYSTEM:  Alert & Oriented X3, Moving all extremities  CHEST/LUNG: Clear to percussion bilaterally; No rales, rhonchi, wheezing, or rubs  HEART: Regular rate and rhythm; No murmurs, rubs, or gallops  ABDOMEN: Soft, Nontender, Nondistended; Bowel sounds present  EXTREMITIES:  , No clubbing, cyanosis, or edema  SKIN: No rashes or lesions                                  14.1   8.57  )-----------( 280      ( 24 Aug 2022 07:35 )             43.2     08-24    139  |  103  |  13  ----------------------------<  92  4.2   |  26  |  0.6<L>    Ca    8.6      24 Aug 2022 07:35  Mg     1.9     08-24    TPro  5.9<L>  /  Alb  3.6  /  TBili  0.4  /  DBili  x   /  AST  11  /  ALT  9   /  AlkPhos  81  08-24                    MEDICATIONS  (STANDING):  aMIOdarone Infusion 1 mG/Min (33.3 mL/Hr) IV Continuous <Continuous>  aMIOdarone Infusion 0.5 mG/Min (16.7 mL/Hr) IV Continuous <Continuous>  cholecalciferol 2000 Unit(s) Oral every 24 hours  enoxaparin Injectable 30 milliGRAM(s) SubCutaneous every 24 hours  famotidine    Tablet 20 milliGRAM(s) Oral daily  losartan 50 milliGRAM(s) Oral daily  montelukast 10 milliGRAM(s) Oral every 24 hours  sodium chloride 0.9%. 1000 milliLiter(s) (75 mL/Hr) IV Continuous <Continuous>    MEDICATIONS  (PRN):  acetaminophen     Tablet .. 650 milliGRAM(s) Oral every 6 hours PRN Temp greater or equal to 38C (100.4F), Mild Pain (1 - 3)  aluminum hydroxide/magnesium hydroxide/simethicone Suspension 30 milliLiter(s) Oral every 4 hours PRN Dyspepsia  melatonin 5 milliGRAM(s) Oral at bedtime PRN Insomnia  ondansetron Injectable 4 milliGRAM(s) IV Push every 8 hours PRN Nausea and/or Vomiting  traMADol 25 milliGRAM(s) Oral every 6 hours PRN Severe Pain (7 - 10)        Imaging Personally Reviewed:     [x ] YES  [ ] NO    Consultant(s) Notes Reviewed:  [x ] YES  [ ] NO    Care Discussed with Consultants/Other Providers [x ] YES  [ ] NO

## 2022-08-26 ENCOUNTER — TRANSCRIPTION ENCOUNTER (OUTPATIENT)
Age: 87
End: 2022-08-26

## 2022-08-26 VITALS
HEART RATE: 78 BPM | TEMPERATURE: 96 F | RESPIRATION RATE: 18 BRPM | DIASTOLIC BLOOD PRESSURE: 67 MMHG | SYSTOLIC BLOOD PRESSURE: 132 MMHG

## 2022-08-26 PROCEDURE — 99232 SBSQ HOSP IP/OBS MODERATE 35: CPT

## 2022-08-26 RX ORDER — AMIODARONE HYDROCHLORIDE 400 MG/1
1 TABLET ORAL
Qty: 0 | Refills: 0 | DISCHARGE
Start: 2022-08-26

## 2022-08-26 RX ORDER — TRAMADOL HYDROCHLORIDE 50 MG/1
0.5 TABLET ORAL
Qty: 0 | Refills: 0 | DISCHARGE
Start: 2022-08-26

## 2022-08-26 RX ORDER — CHOLECALCIFEROL (VITAMIN D3) 125 MCG
2000 CAPSULE ORAL
Qty: 0 | Refills: 0 | DISCHARGE
Start: 2022-08-26

## 2022-08-26 RX ORDER — MONTELUKAST 4 MG/1
1 TABLET, CHEWABLE ORAL
Qty: 0 | Refills: 0 | DISCHARGE
Start: 2022-08-26

## 2022-08-26 RX ADMIN — LOSARTAN POTASSIUM 50 MILLIGRAM(S): 100 TABLET, FILM COATED ORAL at 06:18

## 2022-08-26 RX ADMIN — MONTELUKAST 10 MILLIGRAM(S): 4 TABLET, CHEWABLE ORAL at 06:18

## 2022-08-26 RX ADMIN — AMIODARONE HYDROCHLORIDE 200 MILLIGRAM(S): 400 TABLET ORAL at 06:18

## 2022-08-26 RX ADMIN — Medication 2000 UNIT(S): at 12:02

## 2022-08-26 RX ADMIN — FAMOTIDINE 20 MILLIGRAM(S): 10 INJECTION INTRAVENOUS at 12:02

## 2022-08-26 NOTE — DISCHARGE NOTE NURSING/CASE MANAGEMENT/SOCIAL WORK - PATIENT PORTAL LINK FT
You can access the FollowMyHealth Patient Portal offered by [facility] by registering at the following website: [email or web address]. By joining [facility]’s FollowMyHealth portal, you will also be able to view your health information using other applications (apps) compatible with our system.

## 2022-08-26 NOTE — DISCHARGE NOTE PROVIDER - PROVIDER TOKENS
PROVIDER:[TOKEN:[[id number]],FOLLOWUP:[1 week],ESTABLISHEDPATIENT:[T]] PROVIDER:[TOKEN:[[id number]],FOLLOWUP:[1 week],ESTABLISHEDPATIENT:[T]],PROVIDER:[TOKEN:[[id number]],FOLLOWUP:[1 week]],PROVIDER:[TOKEN:[[id number]],FOLLOWUP:[1 week]] PROVIDER:[TOKEN:[[id number]],FOLLOWUP:[1 week],ESTABLISHEDPATIENT:[T]],PROVIDER:[TOKEN:[[id number]]],PROVIDER:[TOKEN:[[id number]],FOLLOWUP:[1 week]]

## 2022-08-26 NOTE — DISCHARGE NOTE PROVIDER - CARE PROVIDERS DIRECT ADDRESSES
,DirectAddress_Unknown ,DirectAddress_Unknown,[email or web address]

## 2022-08-26 NOTE — DISCHARGE NOTE PROVIDER - NSDCFUADDAPPT_GEN_ALL_CORE_FT
Please follow up with neurosurgery, cardiology, and your doctor in one week  Please come back to hospital if you developed any new symptoms or concerns Please follow up with  cardiology, and your doctor in one week  Please follow up with neurosurgery as soon as possible after discharge  Please come back to hospital if you developed any new symptoms or concerns

## 2022-08-26 NOTE — DISCHARGE NOTE PROVIDER - HOSPITAL COURSE
FROM ADMISSION H+P:   HPI:  89 years old female history of hypertensions, high cholesterol, aortic aneurysm BIBA from home status post syncopal episode during dinner this evening.  As per son, patient has been having unexplained syncopal episodes since February of this year.  Patient was admitted in February and July for syncope with traumatic injury.  Patient was just discharged from [facility] earlier today after rehab.  Patient was eating dinner this evening and suddenly passed out again on the table.  Family noted before her apple so and was able to catch patient and will patient is onto the table.  Patient woke up couple minutes later and had another episode of near syncope.  Family laid patient on the floor for 30 minutes and called the EMS.  Patient appeared to be confused after she woke up and having rapid breathing at the time.  Patient otherwise denies symptoms in ED.  Does not recall the episode.  Denies headache, worsening neck pain, lower back pain, extremity pains, chest pain and abdominal pain (20 Aug 2022 23:05)      ---  HOSPITAL COURSE:   Syncope, Recurrent Episodes   -Denies symptoms prior to episode , but has Post-Ictal confusion   -Possible Seizures? given  Meningioma: Neuro consult appreciated  -REEG: was normal, hence neuro signed off   -Cardio Eval Appreciated : EKG : NSR, Troponin Negative, 2D ECHO: wnl   , Orthostatic Vitals Positive: Compression stockings and abdominal binder with adequate hydration   -, Carotid US cannot be done secondary to Type II dens fracture with Neck collar in place   -TTE noticed     New Onset AFIB with RVR , CHADS: 4 (8/23/22)   -continue with Amiodarone Infusion, monitor LFT's  -AC not a good idea given falls   -TSH level ok      COVID +  ID consulted w/ recs for symptomatic care & monitoring and isolation room implemented  Patient was medically optimized and improved clinically throughout hospital course. Patient examined on day of discharge and  found to be medically stable for discharge by Dr. Bernardo to NH with outpatient follow up.    Vital Signs Last 24 Hrs  T(C): 36.1 (26 Aug 2022 05:05), Max: 37 (25 Aug 2022 14:15)  T(F): 96.9 (26 Aug 2022 05:05), Max: 98.6 (25 Aug 2022 14:15)  HR: 76 (26 Aug 2022 05:05) (76 - 87)  BP: 151/73 (26 Aug 2022 05:05) (132/58 - 151/73)  BP(mean): --  RR: 18 (26 Aug 2022 05:05) (18 - 18)  SpO2: 95% (26 Aug 2022 06:44) (95% - 95%)    Parameters below as of 26 Aug 2022 06:44  Patient On (Oxygen Delivery Method): room air    Syncope, Recurrent Episodes   -Denies symptoms prior to episode , but has Post-Ictal confusion   -Possible Seizures? given  Meningioma: Neuro consult appreciated  -REEG: was normal, hence neuro signed off   -Cardio Eval Appreciated : EKG : NSR, Troponin Negative, 2D ECHO: wnl   , Orthostatic Vitals Positive: Compression stockings and abdominal binder with adequate hydration.  Orthostatic BP stable now   -, Carotid US cannot be done secondary to Type II dens fracture with Neck collar in place. outpatient follow up with neurosurgery in one week   -TTE noticed     New Onset AFIB with RVR , CHADS: 4 (8/23/22)   -continue with Amiodarone Infusion, monitor LFT's  -AC not a good idea given falls   -TSH level ok        Type II Dens Fracture on CT with Increased Posterior Angulation   -Neurosurgery Consult, recalled 8/23 no further intervention   -Already has neck collar in place **  outpatient follow up with neurosurgery in one week      Essential HTN: controlled   c/w Losartan     Covid-19: Asymptomatic  -Start Dexa and Remdesivir if Pulse-ox below 94%   -COVID Precautions       Patient was medically optimized and improved clinically throughout hospital course. Patient examined on day of discharge and  found to be medically stable. NH with outpatient follow up.  medically stable.  afebrile.  Offers no complaints.  denies any numbness or weakness  Vital Signs Last 24 Hrs  T(C): 36.1 (26 Aug 2022 05:05), Max: 37 (25 Aug 2022 14:15)  T(F): 96.9 (26 Aug 2022 05:05), Max: 98.6 (25 Aug 2022 14:15)  HR: 76 (26 Aug 2022 05:05) (76 - 87)  BP: 151/73 (26 Aug 2022 05:05) (132/58 - 151/73)  BP(mean): --  RR: 18 (26 Aug 2022 05:05) (18 - 18)  SpO2: 95% (26 Aug 2022 06:44) (95% - 95%)    Parameters below as of 26 Aug 2022 06:44  Patient On (Oxygen Delivery Method): room air    PHYSICAL EXAM-  GENERAL: NAD,    HEAD:  Atraumatic, Normocephalic  EYES: EOMI, PERRLA, conjunctiva and sclera clear  NECK: Supple, No JVD, Normal thyroid  NERVOUS SYSTEM:  Alert & Oriented X3, Moving all extremities  CHEST/LUNG: Clear to percussion bilaterally; No rales, rhonchi, wheezing, or rubs  HEART: Regular rate and rhythm; No murmurs, rubs, or gallops  ABDOMEN: Soft, Nontender, Nondistended; Bowel sounds present  EXTREMITIES:    No clubbing, cyanosis, or edema  SKIN: No rashes or lesions  Hospital course, and discharge planning were discussed with patient, and son in details.  all questions were answered.  seems to understand, and in agreement.  time 70 min      Syncope, Recurrent Episodes   -Denies symptoms prior to episode , but has Post-Ictal confusion   -Possible Seizures? given  Meningioma: Neuro consult appreciated  -REEG: was normal, hence neuro signed off   -Cardio Eval Appreciated : EKG : NSR, Troponin Negative, 2D ECHO: wnl   , Orthostatic Vitals Positive: Compression stockings and abdominal binder with adequate hydration.  Orthostatic BP stable now   -, Carotid US cannot be done secondary to Type II dens fracture with Neck collar in place. outpatient follow up with neurosurgery in one week   -TTE noticed     New Onset AFIB with RVR , CHADS: 4 (8/23/22)   -continue with Amiodarone Infusion, monitor LFT's  -AC not a good idea given falls   -TSH level ok        Type II Dens Fracture on CT with Increased Posterior Angulation   -Neurosurgery Consult, recalled 8/23 no further intervention   -Already has neck collar in place **  outpatient follow up with neurosurgery in one week  discussed case with neurosurgery, no need for transfer.  office will call with appointment    Essential HTN: controlled   c/w Losartan     Covid-19: Asymptomatic  -Start Dexa and Remdesivir if Pulse-ox below 94%   -COVID Precautions       Patient was medically optimized and improved clinically throughout hospital course. Patient examined on day of discharge and  found to be medically stable. NH with outpatient follow up.  medically stable.  afebrile.  Offers no complaints.  denies any numbness or weakness  Vital Signs Last 24 Hrs  T(C): 36.1 (26 Aug 2022 05:05), Max: 37 (25 Aug 2022 14:15)  T(F): 96.9 (26 Aug 2022 05:05), Max: 98.6 (25 Aug 2022 14:15)  HR: 76 (26 Aug 2022 05:05) (76 - 87)  BP: 151/73 (26 Aug 2022 05:05) (132/58 - 151/73)  BP(mean): --  RR: 18 (26 Aug 2022 05:05) (18 - 18)  SpO2: 95% (26 Aug 2022 06:44) (95% - 95%)    Parameters below as of 26 Aug 2022 06:44  Patient On (Oxygen Delivery Method): room air    PHYSICAL EXAM-  GENERAL: NAD,    HEAD:  Atraumatic, Normocephalic  EYES: EOMI, PERRLA, conjunctiva and sclera clear  NECK: Supple, No JVD, Normal thyroid  NERVOUS SYSTEM:  Alert & Oriented X3, Moving all extremities  CHEST/LUNG: Clear to percussion bilaterally; No rales, rhonchi, wheezing, or rubs  HEART: Regular rate and rhythm; No murmurs, rubs, or gallops  ABDOMEN: Soft, Nontender, Nondistended; Bowel sounds present  EXTREMITIES:    No clubbing, cyanosis, or edema  SKIN: No rashes or lesions  Hospital course, and discharge planning were discussed with patient, and son in details.  all questions were answered.  seems to understand, and in agreement.  time 70 min

## 2022-08-26 NOTE — DISCHARGE NOTE PROVIDER - NSDCACTIVITY_GEN_ALL_CORE
Follow Instructions Provided by your Surgical Team Do not drive or operate machinery/Do not make important decisions/No heavy lifting/straining/Follow Instructions Provided by your Surgical Team

## 2022-08-26 NOTE — DISCHARGE NOTE PROVIDER - NSDCCPCAREPLAN_GEN_ALL_CORE_FT
PRINCIPAL DISCHARGE DIAGNOSIS  Diagnosis: Syncope  Assessment and Plan of Treatment:       SECONDARY DISCHARGE DIAGNOSES  Diagnosis: 2019 novel coronavirus disease (COVID-19)  Assessment and Plan of Treatment: You have tested POSITIVE for the novel coronavirus (COVID-19). Upon discharge, you must self-quarantine for 14 days, or until the Department of Health contacts you. Please wear a face mask if you are around other individuals. Try to avoid contact with house members, family, and friends for the duration of this quarantine. Please follow up with your primary care physician within 2-3 weeks of your discharge from the hospital. Please take all medications as prescribed. If you experience any worsening or recurrence of your symptoms, particularly worsening or high fever, shortness of breathe, extreme fatigue, or bloody cough please call 9-1-1 immediately or report to the nearest Emergency Department.     PRINCIPAL DISCHARGE DIAGNOSIS  Diagnosis: Syncope  Assessment and Plan of Treatment: outpatient follow up with your doctor in one week      SECONDARY DISCHARGE DIAGNOSES  Diagnosis: 2019 novel coronavirus disease (COVID-19)  Assessment and Plan of Treatment: You have tested POSITIVE for the novel coronavirus (COVID-19). Upon discharge, you must self-quarantine for 14 days, or until the Department of Health contacts you. Please wear a face mask if you are around other individuals. Try to avoid contact with house members, family, and friends for the duration of this quarantine. Please follow up with your primary care physician within 2-3 weeks of your discharge from the hospital. Please take all medications as prescribed. If you experience any worsening or recurrence of your symptoms, particularly worsening or high fever, shortness of breathe, extreme fatigue, or bloody cough please call 9-1-1 immediately or report to the nearest Emergency Department.    Diagnosis: Closed dens fracture  Assessment and Plan of Treatment: outpatient follow up with neurosurgery in one week  continue with neck brace

## 2022-08-26 NOTE — DISCHARGE NOTE NURSING/CASE MANAGEMENT/SOCIAL WORK - NSDCPEFALRISK_GEN_ALL_CORE
For information on Fall & Injury Prevention, visit: [email or web address] OR  [email or web address] OR  https://www.cdc.gov/steadi/patient.html

## 2022-08-26 NOTE — PROGRESS NOTE ADULT - PROVIDER SPECIALTY LIST ADULT
Cardiology
Hospitalist
Hospitalist
Infectious Disease
Infectious Disease
Hospitalist
Infectious Disease
Infectious Disease

## 2022-08-26 NOTE — PROGRESS NOTE ADULT - SUBJECTIVE AND OBJECTIVE BOX
CHAZ BUCHANAN  89y, Female  Allergy: penicillin (Eye Irritation)  sulfa drugs (Eye Irritation)      LOS  6d    CHIEF COMPLAINT: Syncope (26 Aug 2022 08:52)      INTERVAL EVENTS/HPI  - No acute events overnight  - T(F): , Max: 98.6 (08-25-22 @ 14:15)  - Denies any worsening symptoms  - Tolerating medication  - WBC Count: 8.57 (08-24-22 @ 07:35)     -      ROS  General: Denies rigors, nightsweats  HEENT: Denies headache, rhinorrhea, sore throat, eye pain  CV: Denies CP, palpitations  PULM: Denies wheezing, hemoptysis  GI: Denies hematemesis, hematochezia, melena  : Denies discharge, hematuria  MSK: Denies arthralgias, myalgias  SKIN: Denies rash, lesions  NEURO: Denies paresthesias, weakness  PSYCH: Denies depression, anxiety    VITALS:  T(F): 96.9, Max: 98.6 (08-25-22 @ 14:15)  HR: 76  BP: 151/73  RR: 18Vital Signs Last 24 Hrs  T(C): 36.1 (26 Aug 2022 05:05), Max: 37 (25 Aug 2022 14:15)  T(F): 96.9 (26 Aug 2022 05:05), Max: 98.6 (25 Aug 2022 14:15)  HR: 76 (26 Aug 2022 05:05) (76 - 87)  BP: 151/73 (26 Aug 2022 05:05) (132/58 - 151/73)  BP(mean): --  RR: 18 (26 Aug 2022 05:05) (18 - 18)  SpO2: 95% (26 Aug 2022 06:44) (95% - 95%)    Parameters below as of 26 Aug 2022 06:44  Patient On (Oxygen Delivery Method): room air        PHYSICAL EXAM:  Gen: NAD, resting in bed  HEENT: Normocephalic, atraumatic  Neck: supple, no lymphadenopathy  CV: Regular rate & regular rhythm  Lungs: decreased BS at bases, no fremitus  Abdomen: Soft, BS present  Ext: Warm, well perfused  Neuro: non focal, awake  Skin: no rash, no erythema  Lines: no phlebitis    FH: Non-contributory  Social Hx: Non-contributory    TESTS & MEASUREMENTS:                        INFECTIOUS DISEASES TESTING  COVID-19 PCR: Detected (08-25-22 @ 07:52)  COVID-19 PCR: Detected (08-20-22 @ 20:50)  COVID-19 PCR: NotDetec (08-01-22 @ 14:52)  COVID-19 PCR: NotDetec (07-29-22 @ 06:23)  COVID-19 PCR: NotDetec (07-20-22 @ 14:02)  COVID-19 PCR: NotDetec (02-19-22 @ 15:45)  COVID-19 PCR: NotDetec (02-17-22 @ 19:25)  COVID-19 PCR: NotDetec (02-15-22 @ 15:42)      INFLAMMATORY MARKERS      RADIOLOGY & ADDITIONAL TESTS:  I have personally reviewed the last available Chest xray  CXR      CT      CARDIOLOGY TESTING  12 Lead ECG:   Ventricular Rate 82 BPM    Atrial Rate 82 BPM    P-R Interval 168 ms    QRS Duration 70 ms    Q-T Interval 370 ms    QTC Calculation(Bazett) 432 ms    P Axis -8 degrees    R Axis 12 degrees    T Axis 31 degrees    Diagnosis Line Normal sinus rhythm  Normal ECG    Confirmed by TERRA GRECO MD ([id number]) on 8/24/2022 10:26:15 AM (08-23-22 @ 18:55)  12 Lead ECG:   Ventricular Rate 123 BPM    Atrial Rate 340 BPM    QRS Duration 60 ms    Q-T Interval 302 ms    QTC Calculation(Bazett) 432 ms    R Axis -3 degrees    T Axis -86 degrees    Diagnosis Line Atrial fibrillation with rapid ventricular response  Nonspecific ST abnormality  Abnormal QRS-T angle, consider primary T wave abnormality  Poor R wave progression  Abnormal ECG    Confirmed by TERRA GRECO MD ([id number]) on 8/23/2022 9:41:24 AM (08-23-22 @ 09:36)      MEDICATIONS  aMIOdarone    Tablet 200 Oral daily  cholecalciferol 2000 Oral every 24 hours  enoxaparin Injectable 30 SubCutaneous every 24 hours  famotidine    Tablet 20 Oral daily  losartan 50 Oral daily  montelukast 10 Oral every 24 hours      WEIGHT  Weight (kg): 50.1 (08-20-22 @ 23:40)      ANTIBIOTICS:      All available historical records have been reviewed

## 2022-08-26 NOTE — PROGRESS NOTE ADULT - ASSESSMENT
Patient is a 89y old  Female with history of hypertensions, high cholesterol, aortic aneurysm, now BIBA from home status post syncopal episode during dinner this evening.  As per son, patient has been having unexplained syncopal episodes since February of this year.  Patient was admitted in February and July for syncope with traumatic injury.  Patient was just discharged from [facility] earlier after rehab.  Patient was eating dinner this evening and suddenly passed out again on the table.   Patient woke up couple minutes later and had another episode of near syncope.  Family laid patient on the floor for 30 minutes and called the EMS.  Patient appeared to be confused after she woke up and having rapid breathing at the time.  Patient otherwise denies symptoms in ED.  Does not recall the episode.  Denies headache, worsening neck pain. On admission, she found to have no fever, but tachycardia and positive COVID PCR. The CXR shows B/L Opacities but she is not hypoxic. The ID consult requested to assist with further management of COVID.    # Positive COVID  # S/p Syncopal episode    Recommendations  - Supportive care for COVID including AC, monitor oxygen saturation closely  - continue isolation precautions  - recall as needed if febrile or O2 status worsens    Please call or message on Microsoft Teams if with any questions.  Spectra [phone]

## 2022-08-26 NOTE — PROGRESS NOTE ADULT - SUBJECTIVE AND OBJECTIVE BOX
CC Syncope   HPI.  Patient reports that she feels better  Offers no complaints               Constitutional: No fever, fatigue or weight loss.  Skin: No rash.  Eyes: No recent vision problems or eye pain.  ENT: No congestion, ear pain, or sore throat.  Endocrine: No thyroid problems.  Cardiovascular: No chest pain or palpation.  Respiratory: No cough, shortness of breath, congestion, or wheezing.  Gastrointestinal: No abdominal pain, nausea, vomiting, or diarrhea.  Genitourinary: No dysuria.  Musculoskeletal: No joint swelling.  Neurologic: No headache.    Vital Signs Last 24 Hrs  T(C): 36.1 (26 Aug 2022 05:05), Max: 37 (25 Aug 2022 14:15)  T(F): 96.9 (26 Aug 2022 05:05), Max: 98.6 (25 Aug 2022 14:15)  HR: 76 (26 Aug 2022 05:05) (76 - 87)  BP: 151/73 (26 Aug 2022 05:05) (132/58 - 151/73)  BP(mean): --  RR: 18 (26 Aug 2022 05:05) (18 - 18)  SpO2: 95% (26 Aug 2022 06:44) (95% - 95%)    Parameters below as of 26 Aug 2022 06:44  Patient On (Oxygen Delivery Method): room air                PHYSICAL EXAM-  GENERAL: NAD   HEAD:  Atraumatic, Normocephalic  EYES: EOMI, PERRLA, conjunctiva and sclera clear  NECK: Supple, No JVD, Normal thyroid  NERVOUS SYSTEM:  Alert & Oriented X3, Moving all extremities  CHEST/LUNG: Clear to percussion bilaterally; No rales, rhonchi, wheezing, or rubs  HEART: Regular rate and rhythm; No murmurs, rubs, or gallops  ABDOMEN: Soft, Nontender, Nondistended; Bowel sounds present  EXTREMITIES:  , No clubbing, cyanosis, or edema  SKIN: No rashes or lesions          MEDICATIONS  (STANDING):  aMIOdarone Infusion 1 mG/Min (33.3 mL/Hr) IV Continuous <Continuous>  aMIOdarone Infusion 0.5 mG/Min (16.7 mL/Hr) IV Continuous <Continuous>  cholecalciferol 2000 Unit(s) Oral every 24 hours  enoxaparin Injectable 30 milliGRAM(s) SubCutaneous every 24 hours  famotidine    Tablet 20 milliGRAM(s) Oral daily  losartan 50 milliGRAM(s) Oral daily  montelukast 10 milliGRAM(s) Oral every 24 hours  sodium chloride 0.9%. 1000 milliLiter(s) (75 mL/Hr) IV Continuous <Continuous>    MEDICATIONS  (PRN):  acetaminophen     Tablet .. 650 milliGRAM(s) Oral every 6 hours PRN Temp greater or equal to 38C (100.4F), Mild Pain (1 - 3)  aluminum hydroxide/magnesium hydroxide/simethicone Suspension 30 milliLiter(s) Oral every 4 hours PRN Dyspepsia  melatonin 5 milliGRAM(s) Oral at bedtime PRN Insomnia  ondansetron Injectable 4 milliGRAM(s) IV Push every 8 hours PRN Nausea and/or Vomiting  traMADol 25 milliGRAM(s) Oral every 6 hours PRN Severe Pain (7 - 10)        Imaging Personally Reviewed:     [x ] YES  [ ] NO    Consultant(s) Notes Reviewed:  [x ] YES  [ ] NO    Care Discussed with Consultants/Other Providers [x ] YES  [ ] NO

## 2022-08-26 NOTE — DISCHARGE NOTE NURSING/CASE MANAGEMENT/SOCIAL WORK - NSDCFUADDAPPT_GEN_ALL_CORE_FT
Please follow up with  cardiology, and your doctor in one week  Please follow up with neurosurgery as soon as possible after discharge  Please come back to hospital if you developed any new symptoms or concerns

## 2022-08-26 NOTE — DISCHARGE NOTE PROVIDER - NSDCMRMEDTOKEN_GEN_ALL_CORE_FT
acetaminophen 325 mg oral tablet: 2 tab(s) orally every 6 hours, As needed, for pain or fever  alendronate 10 mg oral tablet: 1 tab(s) orally once a day at 6AM--take in upright position with 8 ounces of water and remain upright x 60 minutes after taking it; no food or other meds for 60 minutes after taking it  cholecalciferol oral tablet: 2000 unit(s) orally every 24 hours  heparin: 5000 unit(s) subcutaneous every 8 hours  losartan 50 mg oral tablet: 1 tab(s) orally once a day -- PLEASE HOLD FOR SBP less than 120  melatonin 3 mg oral tablet: 1 tab(s) orally once a day (at bedtime), As needed, Insomnia  montelukast 10 mg oral tablet: 1 tab(s) orally every 24 hours  senna leaf extract oral tablet: 2 tab(s) orally once a day (at bedtime), As needed, Constipation   acetaminophen 325 mg oral tablet: 2 tab(s) orally every 6 hours, As needed, for pain or fever  alendronate 10 mg oral tablet: 1 tab(s) orally once a day at 6AM--take in upright position with 8 ounces of water and remain upright x 60 minutes after taking it; no food or other meds for 60 minutes after taking it  amiodarone 200 mg oral tablet: 1 tab(s) orally once a day  cholecalciferol oral tablet: 2000 unit(s) orally every 24 hours  losartan 50 mg oral tablet: 1 tab(s) orally once a day -- PLEASE HOLD FOR SBP less than 120  melatonin 3 mg oral tablet: 1 tab(s) orally once a day (at bedtime), As needed, Insomnia  montelukast 10 mg oral tablet: 1 tab(s) orally every 24 hours  senna leaf extract oral tablet: 2 tab(s) orally once a day (at bedtime), As needed, Constipation  traMADol 50 mg oral tablet: 0.5 tab(s) orally every 6 hours, As needed, Severe Pain (7 - 10)

## 2022-08-26 NOTE — DISCHARGE NOTE NURSING/CASE MANAGEMENT/SOCIAL WORK - NSDCVIVACCINE_GEN_ALL_CORE_FT
Tdap; 15-Feb-2022 11:47; Sanseverino, Jennifer (RN); [employer]; [id number] (Exp. Date: 28-Sep-2023); IntraMuscular; Deltoid Right.; 0.5 milliLiter(s); VIS (VIS Published: 09-May-2013, VIS Presented: 15-Feb-2022);

## 2022-08-26 NOTE — DISCHARGE NOTE PROVIDER - CARE PROVIDER_API CALL
Dax Frey)  Internal Medicine  [address] [place] [address]  Phone: [phone]  Fax: [phone]  Established Patient  Follow Up Time: 1 week   Dax Frey (MD)  Internal Medicine  [address] [place] [address]  Phone: [phone]  Fax: [phone]  Established Patient  Follow Up Time: 1 week    Ryan Parra)  Surgery  Neurosurgery  [address] [place] [address], [address]  [place] [address]  Phone: [phone]  Fax: [phone]  Follow Up Time: 1 week    Marc Pagan)  Cardiovascular Disease; Internal Medicine  [address] [place] [address]  Phone: [phone]  Fax: [phone]  Follow Up Time: 1 week   Dax Frey (MD)  Internal Medicine  [address] [place] [address]  Phone: [phone]  Fax: [phone]  Established Patient  Follow Up Time: 1 week    Ryan Parra)  Surgery  Neurosurgery  [address] [place] [address], [address]  [place] [address]  Phone: [phone]  Fax: [phone]  Follow Up Time:     Marc Pagan)  Cardiovascular Disease; Internal Medicine  [address] [place] [address]  Phone: [phone]  Fax: [phone]  Follow Up Time: 1 week

## 2022-08-31 DIAGNOSIS — E78.00 PURE HYPERCHOLESTEROLEMIA, UNSPECIFIED: ICD-10-CM

## 2022-08-31 DIAGNOSIS — I95.1 ORTHOSTATIC HYPOTENSION: ICD-10-CM

## 2022-08-31 DIAGNOSIS — U07.1 COVID-19: ICD-10-CM

## 2022-08-31 DIAGNOSIS — I48.91 UNSPECIFIED ATRIAL FIBRILLATION: ICD-10-CM

## 2022-08-31 DIAGNOSIS — S12.111A POSTERIOR DISPLACED TYPE II DENS FRACTURE, INITIAL ENCOUNTER FOR CLOSED FRACTURE: ICD-10-CM

## 2022-08-31 DIAGNOSIS — I10 ESSENTIAL (PRIMARY) HYPERTENSION: ICD-10-CM

## 2022-09-12 PROBLEM — U07.1 COVID-19: Chronic | Status: ACTIVE | Noted: 2022-08-20

## 2022-09-28 ENCOUNTER — APPOINTMENT (OUTPATIENT)
Dept: NEUROSURGERY | Facility: CLINIC | Age: 87
End: 2022-09-28

## 2022-09-28 VITALS — WEIGHT: 109 LBS | HEIGHT: 62 IN | BODY MASS INDEX: 20.06 KG/M2

## 2022-09-28 DIAGNOSIS — M81.0 AGE-RELATED OSTEOPOROSIS W/OUT CURRENT PATHOLOGICAL FRACTURE: ICD-10-CM

## 2022-09-28 DIAGNOSIS — I10 ESSENTIAL (PRIMARY) HYPERTENSION: ICD-10-CM

## 2022-09-28 PROCEDURE — 99214 OFFICE O/P EST MOD 30 MIN: CPT

## 2022-09-30 ENCOUNTER — APPOINTMENT (OUTPATIENT)
Dept: CARDIOLOGY | Facility: CLINIC | Age: 87
End: 2022-09-30

## 2022-09-30 VITALS
HEART RATE: 112 BPM | WEIGHT: 110 LBS | DIASTOLIC BLOOD PRESSURE: 60 MMHG | SYSTOLIC BLOOD PRESSURE: 100 MMHG | OXYGEN SATURATION: 96 % | BODY MASS INDEX: 20.24 KG/M2 | HEIGHT: 62 IN

## 2022-09-30 DIAGNOSIS — I10 ESSENTIAL (PRIMARY) HYPERTENSION: ICD-10-CM

## 2022-09-30 DIAGNOSIS — R55 SYNCOPE AND COLLAPSE: ICD-10-CM

## 2022-09-30 DIAGNOSIS — Z63.4 DISAPPEARANCE AND DEATH OF FAMILY MEMBER: ICD-10-CM

## 2022-09-30 DIAGNOSIS — I48.91 UNSPECIFIED ATRIAL FIBRILLATION: ICD-10-CM

## 2022-09-30 PROCEDURE — 93000 ELECTROCARDIOGRAM COMPLETE: CPT

## 2022-09-30 PROCEDURE — 99215 OFFICE O/P EST HI 40 MIN: CPT

## 2022-09-30 PROCEDURE — 99214 OFFICE O/P EST MOD 30 MIN: CPT | Mod: 25

## 2022-09-30 RX ORDER — LOSARTAN POTASSIUM 100 MG/1
TABLET, FILM COATED ORAL
Refills: 0 | Status: DISCONTINUED | COMMUNITY
End: 2022-09-30

## 2022-09-30 RX ORDER — TRAMADOL HYDROCHLORIDE 50 MG/1
50 TABLET, COATED ORAL
Refills: 0 | Status: ACTIVE | COMMUNITY

## 2022-09-30 RX ORDER — METOPROLOL TARTRATE 25 MG/1
25 TABLET, FILM COATED ORAL
Refills: 0 | Status: DISCONTINUED | COMMUNITY
End: 2022-09-30

## 2022-09-30 RX ORDER — MONTELUKAST 10 MG/1
10 TABLET, FILM COATED ORAL
Refills: 0 | Status: ACTIVE | COMMUNITY

## 2022-09-30 RX ORDER — AMIODARONE HYDROCHLORIDE 200 MG/1
200 TABLET ORAL
Refills: 0 | Status: ACTIVE | COMMUNITY

## 2022-09-30 RX ORDER — ALENDRONATE SODIUM 10 MG/1
10 TABLET ORAL
Refills: 0 | Status: ACTIVE | COMMUNITY

## 2022-09-30 SDOH — SOCIAL STABILITY - SOCIAL INSECURITY: DISSAPEARANCE AND DEATH OF FAMILY MEMBER: Z63.4

## 2022-09-30 NOTE — ASSESSMENT
[FreeTextEntry1] : 89 yo syncope at home. In 8/22 found in afib. Patient has poor hearing. Bp low today will stop losartan. VNA to come next week check bp. Discussed with patient and son [place] on phone. Offered [facility]OT. Family not want now. Family aware no AC and risk CVA.

## 2022-09-30 NOTE — REVIEW OF SYSTEMS
[Fever] : no fever [Chills] : no chills [Hearing Loss] : hearing loss [Dyspnea on exertion] : dyspnea during exertion [Cough] : no cough [Abdominal Pain] : no abdominal pain [Joint Pain] : joint pain [Shoulder Pain] : shoulder pain [Rash] : no rash [Confusion] : no confusion was observed [Easy Bleeding] : no tendency for easy bleeding

## 2022-09-30 NOTE — PHYSICAL EXAM
[Well Developed] : well developed [Well Nourished] : well nourished [Normal Conjunctiva] : normal conjunctiva [Normal Venous Pressure] : normal venous pressure [Normal Rate] : normal [Irregularly Irregular] : irregularly irregular [Normal S1] : normal S1 [Normal S2] : normal S2 [S3] : no S3 [S4] : no S4 [I] : a grade 1 [Clear Lung Fields] : clear lung fields [Normal Gait] : normal gait [No Edema] : no edema [No Rash] : no rash [Moves all extremities] : moves all extremities [Alert and Oriented] : alert and oriented

## 2022-10-05 NOTE — HISTORY OF PRESENT ILLNESS
[FreeTextEntry1] : Ms. BUCHANAN is s/p fall on 7/21/22. She was hospitalized after the injury. Upon work up she was found to have a fracture of C1 and a type II  dens fracture with posterior displacement.  She was discharged on a hard collar.\par \par She presents today for follow-up.  Hard collar in place however she is wearing upside down.  On examination she has age-appropriate range of motion of the cervical spine without pain.  Cervical spine nontender. She denies radiculopathy.  She presents in a wheelchair.\par \par Her visiting nurse contacted us yesterday that the patient was having frequent urination at nighttime.  A urinalysis and culture were requested.  This was ordered today however when the patient felt the need to urinate she was unable to provide an adequate sample for the lab.  This will now be deferred to her primary care physician.

## 2022-10-05 NOTE — ASSESSMENT
[FreeTextEntry1] : Ms. BUCHANAN is doing well. She will proceed with cervical xrays AP, Lateral, Flexion, Extension views. I will call the patients family member with those results. Pending the Xray results we will discuss removal of the cervical collar.

## 2022-10-19 ENCOUNTER — APPOINTMENT (OUTPATIENT)
Dept: NEUROSURGERY | Facility: CLINIC | Age: 87
End: 2022-10-19

## 2022-10-24 ENCOUNTER — APPOINTMENT (OUTPATIENT)
Dept: NEUROSURGERY | Facility: CLINIC | Age: 87
End: 2022-10-24

## 2022-10-24 VITALS — WEIGHT: 110 LBS | BODY MASS INDEX: 20.24 KG/M2 | HEIGHT: 62 IN

## 2022-10-24 DIAGNOSIS — S12.000A UNSPECIFIED DISPLACED FRACTURE OF FIRST CERVICAL VERTEBRA, INITIAL ENCOUNTER FOR CLOSED FRACTURE: ICD-10-CM

## 2022-10-24 PROCEDURE — 99214 OFFICE O/P EST MOD 30 MIN: CPT

## 2022-10-24 NOTE — PHYSICAL EXAM
[FreeTextEntry1] : A&Ox3\par HEaring loss\par MAEW 5/5\par No pain to palaption C spine\par No pain to passive ROM/active ROM

## 2022-10-24 NOTE — HISTORY OF PRESENT ILLNESS
[FreeTextEntry1] : I am seeing Kate in follow up. SHe has no neck pain,. no arm or radicular pain. She has some ? movement of her C2 frag with extension and flexion but given her age she is not surgical candidate and long term c-collar is cruel. \par \par \par She has no pain on movement without the collar. \par \par I removed it communicated these findings to her son and will see her back in 6-8 weeks at which point we will check more x-ray.\par \par Script for wheelchair was given as she is quite deconditioned and needs it for going distances.\par \par Her sone (jason [phone] who I spoke with at her request) plans to move her to [place] to be with him.

## 2022-12-05 ENCOUNTER — APPOINTMENT (OUTPATIENT)
Dept: NEUROSURGERY | Facility: CLINIC | Age: 87
End: 2022-12-05

## 2022-12-05 VITALS — HEIGHT: 62 IN | WEIGHT: 102 LBS | BODY MASS INDEX: 18.77 KG/M2

## 2022-12-05 DIAGNOSIS — S12.100A UNSPECIFIED DISPLACED FRACTURE OF SECOND CERVICAL VERTEBRA, INITIAL ENCOUNTER FOR CLOSED FRACTURE: ICD-10-CM

## 2022-12-05 PROCEDURE — 99214 OFFICE O/P EST MOD 30 MIN: CPT

## 2022-12-05 NOTE — PHYSICAL EXAM
[FreeTextEntry1] : A&Ox3\par PERRL\par FS\par TM\par Profound bilateral hearing loss with hearing aides\par MAEW no weakness\par gait with mild instability\par \par Restricted ROM in neck but no pain\par No pain to palpation \par

## 2022-12-05 NOTE — HISTORY OF PRESENT ILLNESS
[FreeTextEntry1] : I am seeing Constance in follow up. She is doing well. Denies any neck pain, , numbness, tingling, weakness, pain in the extremities. She is ambulating independently with cane. She feels her balance is at baseline.\par \par We discussed the follow up X-ray I had requested, she is not interested in pursuing further imaging. I had discussed with her her moving to [place] to be closer to her son which is his request to provide her with care. She refuses this as well as she likes living on her own.

## 2023-01-18 ENCOUNTER — NON-APPOINTMENT (OUTPATIENT)
Age: 88
End: 2023-01-18

## 2023-03-03 ENCOUNTER — APPOINTMENT (OUTPATIENT)
Dept: CARDIOLOGY | Facility: CLINIC | Age: 88
End: 2023-03-03

## 2023-05-03 NOTE — DISCHARGE NOTE NURSING/CASE MANAGEMENT/SOCIAL WORK - NSDCPEFALRISK_GEN_ALL_CORE
Procedure Scheduling Information    Procedure:  Colonoscopy  Last Procedure:  na  Procedure Date:  May 5, 2023  Procedure Time:  1400  Reason:  Screening for colon cancer  Referring provider:  Jeanne Blake APNP    Location: [facility]   Prep:  [place]  Sedation:  IV Sedation     If MAC, why:  N/A  Pacemaker/Defibrillator:  n/a     Device Interrogation Form Faxed:  n/a  Anticoagulation:  n/a    Prescribing Provider:  sneha                       For information on Fall & Injury Prevention, visit: [email or web address] OR  [email or web address] OR  https://www.cdc.gov/steadi/patient.html

## 2023-06-26 NOTE — H&P ADULT - VTE RISK ASSESSMENT
VTE Assessment already completed for this visit High Dose Vitamin A Counseling: Side effects reviewed, pt to contact office should one occur.

## 2023-08-08 NOTE — H&P ADULT - PATIENT'S PREFERRED PRONOUN
LVM for patient informing that Dr. Moy does not need to see her in office and the left hip fluoro guided injection order has been placed. Provided central scheduling number for patient to call and schedule.  
Patient asking to get another left hip fluoro-guided injection.     Patient's last left hip fluoro-guided injection was on 4/3/23. Patient asking if they need to be seen in office before order for another injection can be placed.     Please contact patient to advise.    
Her/She

## 2023-09-20 NOTE — ED ADULT NURSE NOTE - NSFALLRSKASSESSTYPE_ED_ALL_ED
What Type Of Note Output Would You Prefer (Optional)?: Standard Output How Severe Are Your Spot(S)?: mild Have Your Spot(S) Been Treated In The Past?: has not been treated Hpi Title: Evaluation of Skin Lesions Initial (On Arrival)

## 2023-11-20 NOTE — H&P ADULT - NSHPLABSRESULTS_GEN_ALL_CORE
Labs:  CAPILLARY BLOOD GLUCOSE                        14.1   17.32 )-----------( 242      ( 20 Jul 2022 14:02 )             42.5       Auto Neutrophil %: 92.4 % (07-20-22 @ 14:02)  Auto Immature Granulocyte %: 0.5 % (07-20-22 @ 14:02)  Band Neutrophils %: 2.0 % (07-20-22 @ 14:02)    07-20    138  |  100  |  19  ----------------------------<  137<H>  4.2   |  26  |  0.6<L>    Calcium, Total Serum: 9.6 mg/dL (07-20-22 @ 14:02)    LFTs:             6.7  | 0.4  | 18       ------------------[73      ( 20 Jul 2022 14:02 )  4.2  | x    | 16          Lipase:x      Amylase:x        Coags:    RADIOLOGY & ADDITIONAL STUDIES:  --------------------------------------------------------------------------------------- Instructions: This plan will send the code FBSD to the PM system.  DO NOT or CHANGE the price. Detail Level: Zone Price (Do Not Change): 0.00 Labs:  CAPILLARY BLOOD GLUCOSE                        14.1   17.32 )-----------( 242      ( 20 Jul 2022 14:02 )             42.5       Auto Neutrophil %: 92.4 % (07-20-22 @ 14:02)  Auto Immature Granulocyte %: 0.5 % (07-20-22 @ 14:02)  Band Neutrophils %: 2.0 % (07-20-22 @ 14:02)    07-20    138  |  100  |  19  ----------------------------<  137<H>  4.2   |  26  |  0.6<L>    Calcium, Total Serum: 9.6 mg/dL (07-20-22 @ 14:02)    LFTs:             6.7  | 0.4  | 18       ------------------[73      ( 20 Jul 2022 14:02 )  4.2  | x    | 16          Lipase:x      Amylase:x        Coags:    RADIOLOGY & ADDITIONAL STUDIES:  < from: CT Cervical Spine No Cont (07.20.22 @ 12:58) >  IMPRESSION:  CT HEAD:  Midline supraorbital scalp hematoma without underlying calvarial fracture   or intracranial hemorrhage.    Mild chronic microvascular type changes.    Unchanged appearance of a small partially calcified extra-axial mass   overlying the right frontal lobe possibly representing a meningioma.    CT CERVICAL SPINE:  Multiple acute displaced fractures involving the C1 vertebra as well as a   type II dens fracture with minimal posterior angulation of the superior   fracture fragment.  --- End of Report ---    ---------------------------------------------------------------------------------------

## 2023-12-29 NOTE — PATIENT PROFILE ADULT - FALL HARM RISK - DATE OF FALL
In an effort to ensure that our patients LiveWell, a Team Member has reviewed your chart and identified an opportunity to provide the best care possible. An attempt was made to discuss or schedule overdue Preventive or Disease Management screening.     The Outcome was Contact was not made, letter/portal message sent.  If you have any questions or need help with scheduling, contact your primary care provider.. Care Gaps include Colorectal Cancer Screening.    
22-Jul-2022

## 2023-12-29 NOTE — DISCHARGE NOTE PROVIDER - NSDCFUADDINST_GEN_ALL_CORE_FT
*Please carry these paper work with you for all your medical follow  up  and show to all health care personnel involved in your care .    Hard cervical collar on at all times  please.  *Please carry this paper work with you for all your medical follow  ups  and show to all health care personnel involved in your care .    Hard cervical collar on at all times  please.  No

## 2024-08-29 NOTE — ED PROVIDER NOTE - CARE PLAN
Unknown
Principal Discharge DX:	Pneumonia  Secondary Diagnosis:	Hemoptysis  Secondary Diagnosis:	Bronchiectasis

## 2024-10-09 NOTE — PHYSICAL THERAPY INITIAL EVALUATION ADULT - PHYSICAL ASSIST/NONPHYSICAL ASSIST: STAND/SIT, REHAB EVAL
Anesthesia Evaluation     Patient summary reviewed and Nursing notes reviewed   NPO Solid Status: > 8 hours  NPO Liquid Status: > 8 hours           Airway   Mallampati: II  TM distance: >3 FB  Neck ROM: full  No difficulty expected  Dental - normal exam     Pulmonary    (+) ,sleep apnea  Cardiovascular     (+) hypertension, valvular problems/murmurs, hyperlipidemia      Neuro/Psych  (+) numbness  GI/Hepatic/Renal/Endo    (+) hiatal hernia, GERD, renal disease-    Musculoskeletal     Abdominal    Substance History      OB/GYN          Other                    Anesthesia Plan    ASA 2     general and MAC     intravenous induction     Anesthetic plan, risks, benefits, and alternatives have been provided, discussed and informed consent has been obtained with: patient.    Plan discussed with CRNA.    CODE STATUS:          hand placement and technique for safety/verbal cues/1 person assist

## 2024-10-18 NOTE — ED ADULT TRIAGE NOTE - PAIN RATING/NUMBER SCALE (0-10): ACTIVITY
Group Therapy Note    Date: 10/18/2024  Start Time: 0815  End Time:  0830  Number of Participants: 9    Type of Group: Community Meeting    Wellness Binder Information  Module Name:  Community Meeting      Patient's Goal:  Patient will be oriented to the unit including but not limited expectations, staff, programming schedule.  Patient will be able to ID expectations of treatment.     Notes: Patient was a participant in community meeting, and was updated on expectations of the unit, staffing, programming schedule, and acclimated to their environment.    Patient shared goal for today as \"eat more. Talk to the  and look into having meals delivered\"    Status After Intervention:  Improved    Participation Level: Active Listener and Interactive    Participation Quality: Appropriate and Attentive      Speech:  normal      Thought Process/Content: Logical      Affective Functioning: Congruent      Mood: anxious      Level of consciousness:  Alert and Attentive      Response to Learning: Able to verbalize current knowledge/experience, Able to verbalize/acknowledge new learning, and Progressing to goal      Endings: None Reported    Modes of Intervention: Exploration, Clarifying, and Problem-solving      Discipline Responsible: Recreational Therapist      Signature:  DAVIDSON Araya     0